# Patient Record
Sex: FEMALE | Race: BLACK OR AFRICAN AMERICAN | NOT HISPANIC OR LATINO | Employment: OTHER | URBAN - METROPOLITAN AREA
[De-identification: names, ages, dates, MRNs, and addresses within clinical notes are randomized per-mention and may not be internally consistent; named-entity substitution may affect disease eponyms.]

---

## 2020-01-06 ENCOUNTER — OFFICE VISIT (OUTPATIENT)
Dept: FAMILY MEDICINE | Facility: CLINIC | Age: 85
End: 2020-01-06
Payer: MEDICARE

## 2020-01-06 VITALS
SYSTOLIC BLOOD PRESSURE: 130 MMHG | HEIGHT: 59 IN | WEIGHT: 138.69 LBS | TEMPERATURE: 98 F | DIASTOLIC BLOOD PRESSURE: 60 MMHG | BODY MASS INDEX: 27.96 KG/M2 | OXYGEN SATURATION: 95 % | RESPIRATION RATE: 16 BRPM | HEART RATE: 68 BPM

## 2020-01-06 DIAGNOSIS — E11.59 HYPERTENSION ASSOCIATED WITH DIABETES: Primary | ICD-10-CM

## 2020-01-06 DIAGNOSIS — E11.9 TYPE 2 DIABETES MELLITUS WITHOUT COMPLICATION, WITHOUT LONG-TERM CURRENT USE OF INSULIN: ICD-10-CM

## 2020-01-06 DIAGNOSIS — Z78.0 POSTMENOPAUSAL: ICD-10-CM

## 2020-01-06 DIAGNOSIS — H40.9 GLAUCOMA, UNSPECIFIED GLAUCOMA TYPE, UNSPECIFIED LATERALITY: ICD-10-CM

## 2020-01-06 DIAGNOSIS — M79.676 PAIN OF GREAT TOE, UNSPECIFIED LATERALITY: ICD-10-CM

## 2020-01-06 DIAGNOSIS — R01.1 CARDIAC MURMUR: ICD-10-CM

## 2020-01-06 DIAGNOSIS — E03.9 HYPOTHYROIDISM, UNSPECIFIED TYPE: ICD-10-CM

## 2020-01-06 DIAGNOSIS — I15.2 HYPERTENSION ASSOCIATED WITH DIABETES: Primary | ICD-10-CM

## 2020-01-06 PROCEDURE — 1126F PR PAIN SEVERITY QUANTIFIED, NO PAIN PRESENT: ICD-10-PCS | Mod: S$GLB,,, | Performed by: FAMILY MEDICINE

## 2020-01-06 PROCEDURE — 1101F PR PT FALLS ASSESS DOC 0-1 FALLS W/OUT INJ PAST YR: ICD-10-PCS | Mod: CPTII,S$GLB,, | Performed by: FAMILY MEDICINE

## 2020-01-06 PROCEDURE — 99999 PR PBB SHADOW E&M-NEW PATIENT-LVL IV: ICD-10-PCS | Mod: PBBFAC,,, | Performed by: FAMILY MEDICINE

## 2020-01-06 PROCEDURE — 1159F PR MEDICATION LIST DOCUMENTED IN MEDICAL RECORD: ICD-10-PCS | Mod: S$GLB,,, | Performed by: FAMILY MEDICINE

## 2020-01-06 PROCEDURE — 1101F PT FALLS ASSESS-DOCD LE1/YR: CPT | Mod: CPTII,S$GLB,, | Performed by: FAMILY MEDICINE

## 2020-01-06 PROCEDURE — 99204 PR OFFICE/OUTPT VISIT, NEW, LEVL IV, 45-59 MIN: ICD-10-PCS | Mod: S$GLB,,, | Performed by: FAMILY MEDICINE

## 2020-01-06 PROCEDURE — 1126F AMNT PAIN NOTED NONE PRSNT: CPT | Mod: S$GLB,,, | Performed by: FAMILY MEDICINE

## 2020-01-06 PROCEDURE — 99204 OFFICE O/P NEW MOD 45 MIN: CPT | Mod: S$GLB,,, | Performed by: FAMILY MEDICINE

## 2020-01-06 PROCEDURE — 1159F MED LIST DOCD IN RCRD: CPT | Mod: S$GLB,,, | Performed by: FAMILY MEDICINE

## 2020-01-06 PROCEDURE — 99999 PR PBB SHADOW E&M-NEW PATIENT-LVL IV: CPT | Mod: PBBFAC,,, | Performed by: FAMILY MEDICINE

## 2020-01-06 RX ORDER — AMLODIPINE BESYLATE 10 MG/1
5 TABLET ORAL DAILY
COMMUNITY
Start: 2019-12-30 | End: 2021-06-07

## 2020-01-06 RX ORDER — LEVOTHYROXINE SODIUM 50 UG/1
50 CAPSULE ORAL
Qty: 90 TABLET | Refills: 1 | Status: SHIPPED | OUTPATIENT
Start: 2020-01-06 | End: 2020-07-15

## 2020-01-06 RX ORDER — HYDRALAZINE HYDROCHLORIDE 100 MG/1
100 TABLET, FILM COATED ORAL EVERY 8 HOURS
COMMUNITY
End: 2020-01-06 | Stop reason: CLARIF

## 2020-01-06 RX ORDER — BUMETANIDE 1 MG/1
1 TABLET ORAL DAILY
COMMUNITY
Start: 2017-04-08

## 2020-01-06 RX ORDER — FLASH GLUCOSE SENSOR
KIT MISCELLANEOUS
COMMUNITY
Start: 2019-12-14 | End: 2020-01-13 | Stop reason: SDUPTHER

## 2020-01-06 RX ORDER — LEVOTHYROXINE SODIUM 25 UG/1
50 TABLET ORAL
COMMUNITY
End: 2020-01-06 | Stop reason: SDUPTHER

## 2020-01-06 RX ORDER — HYDRALAZINE HYDROCHLORIDE 10 MG/1
10 TABLET, FILM COATED ORAL 3 TIMES DAILY
COMMUNITY
End: 2020-01-06 | Stop reason: SDUPTHER

## 2020-01-06 RX ORDER — LOSARTAN POTASSIUM 100 MG/1
100 TABLET ORAL DAILY
COMMUNITY
Start: 2019-12-30 | End: 2021-05-06

## 2020-01-06 RX ORDER — DORZOLAMIDE HYDROCHLORIDE AND TIMOLOL MALEATE 20; 5 MG/ML; MG/ML
1 SOLUTION/ DROPS OPHTHALMIC
COMMUNITY

## 2020-01-06 RX ORDER — CETIRIZINE HYDROCHLORIDE 10 MG/1
10 TABLET ORAL DAILY
COMMUNITY

## 2020-01-06 RX ORDER — HYDRALAZINE HYDROCHLORIDE 10 MG/1
10 TABLET, FILM COATED ORAL 3 TIMES DAILY
Qty: 270 TABLET | Refills: 1 | Status: SHIPPED | OUTPATIENT
Start: 2020-01-06 | End: 2020-01-10

## 2020-01-06 RX ORDER — CLONIDINE HYDROCHLORIDE 0.1 MG/1
0.1 TABLET ORAL 2 TIMES DAILY
COMMUNITY

## 2020-01-06 NOTE — PROGRESS NOTES
Edie Hammond    Chief Complaint   Patient presents with    Establish Care       History of Present Illness:   Ms. Hammond comes in today to establish care with me.  She is not fasting but has taken medication today.  She states she has been previously followed by PCP Dr. Teddy Murdock with last visit 3 months ago.      She states she is due for 3-month labs (A1c, lipid panel, CMP) follow-up for diabetes.  She states she currently takes Januvia 100 mg daily for treatment of diabetes for 1 month without problems.  She states she uses Freestyle Tanmay CGM with fasting levels ranging <100 and 2-hr postprandial levels ranging <200's.      She states she follows with cardiologist Dr. Milton with last visit in December 2019 at which time she states she had cardiac test performed.    She states she occasionally feels tired.      She states she occasionally has pain at her right great toe; she desires to see Podiatry.    Otherwise, she denies having fever, chills, appetite changes; shortness of breath, cough, wheezing; chest pain, palpitations, leg swelling; abdominal pain, nausea, vomiting, diarrhea, constipation; unusual urinary symptoms; polydipsia, polyuria, polyphagia; back pain; headache; anxiety, depression, homicidal or suicidal thoughts.  She states she monitors her diet and exercises little.      Past Medical History:  No date: Diabetes  No date: Glaucoma      Comment:  Follows with Dr. 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 Faisal Milton, cardiology  No date: HTN (hypertension)  No date: Hypothyroidism  No date: Postmenopausal  No date: Seasonal allergies      Current Outpatient Medications   Medication Sig    amLODIPine (NORVASC) 10 MG tablet Take 5 mg by mouth once daily.     bumetanide (BUMEX) 1 MG tablet One tablet by mouth every Sunday and Wednesday  Indications: hypertension    cetirizine (ZYRTEC) 10 MG tablet Take 10 mg by  mouth once daily.     cloNIDine (CATAPRES) 0.1 MG tablet Take 0.1 mg by mouth daily as needed (takes only if SBP > 180 per cardiologist).     dorzolamide-timolol 2-0.5% (COSOPT) 22.3-6.8 mg/mL ophthalmic solution 1 drop.    FREESTYLE EVELYN 14 DAY SENSOR Kit UTD TO CHECK BLOOD GLUCOSE. CHANGE Q 14 DAYS    levothyroxine (SYNTHROID) 25 MCG tablet Take 50 mcg by mouth before breakfast.     losartan (COZAAR) 100 MG tablet Take 100 mg by mouth once daily.    multivitamin with minerals tablet Take 1 tablet by mouth once daily.     SITagliptin (JANUVIA) 100 MG Tab Take 100 mg by mouth once daily.       Hydralazine 10 mg                      Take 10 mg three times daily    Review of Systems   Constitutional: Positive for fatigue. Negative for activity change, appetite change, chills and fever.   Respiratory: Negative for cough, shortness of breath and wheezing.    Cardiovascular: Negative for chest pain, palpitations and leg swelling.        See history of present illness.   Gastrointestinal: Negative for abdominal pain, constipation, diarrhea, nausea and vomiting.   Endocrine: Negative for cold intolerance, heat intolerance, polydipsia, polyphagia and polyuria.        See history of present illness.   Genitourinary: Negative for difficulty urinating.   Musculoskeletal: Positive for myalgias. Negative for back pain.   Neurological: Negative for headaches.   Psychiatric/Behavioral: Negative for dysphoric mood and suicidal ideas. The patient is not nervous/anxious.         Negative for homicidal ideas.       Objective:  Physical Exam   Constitutional: She is oriented to person, place, and time. She appears well-developed and well-nourished. No distress.   Pleasant.   Neck: Normal range of motion. Neck supple. No thyromegaly present.   Cardiovascular: Normal rate, regular rhythm and intact distal pulses.   Murmur heard.  Pulses:       Dorsalis pedis pulses are 3+ on the right side, and 3+ on the left side.         Posterior tibial pulses are 3+ on the right side, and 3+ on the left side.   Chronic per patient (murmur noted).   Pulmonary/Chest: Effort normal and breath sounds normal. No stridor. No respiratory distress. She has no wheezes.   Abdominal: Soft. Bowel sounds are normal. She exhibits no distension and no mass. There is no tenderness. There is no guarding.   Musculoskeletal: Normal range of motion. She exhibits tenderness. She exhibits no edema.   Slightly tender right great toe without swelling, warmth or redness and with full range of motion noted. She is ambulatory with assistance of cane.   Feet:   Right Foot:   Protective Sensation: 5 sites tested. 5 sites sensed.   Skin Integrity: Negative for ulcer or skin breakdown.   Left Foot:   Protective Sensation: 5 sites tested. 5 sites sensed.   Skin Integrity: Negative for ulcer or skin breakdown.   Lymphadenopathy:     She has no cervical adenopathy.   Neurological: She is alert and oriented to person, place, and time.   Skin: She is not diaphoretic.   Psychiatric: She has a normal mood and affect. Her behavior is normal. Judgment and thought content normal.   Vitals reviewed.      ASSESSMENT:  1. Hypertension associated with diabetes    2. Cardiac murmur    3. Type 2 diabetes mellitus without complication, without long-term current use of insulin    4. Hypothyroidism, unspecified type    5. Pain of great toe, unspecified laterality    6. Glaucoma, unspecified glaucoma type, unspecified laterality    7. Postmenopausal        PLAN:  Edie Almanzar was seen today for establish care.    Diagnoses and all orders for this visit:    Hypertension associated with diabetes  -     Lipid panel; Future  -     hydrALAZINE (APRESOLINE) 10 MG tablet; Take 1 tablet (10 mg total) by mouth 3 (three) times daily.    Cardiac murmur    Type 2 diabetes mellitus without complication, without long-term current use of insulin  -     Hemoglobin A1c; Future  -     Comprehensive metabolic  panel; Future  -     Lipid panel; Future  -     SITagliptin (JANUVIA) 100 MG Tab; Take 1 tablet (100 mg total) by mouth once daily.  -     Ambulatory referral to Podiatry    Hypothyroidism, unspecified type  -     levothyroxine (TIROSINT) 50 mcg Cap; Take 50 mcg by mouth before breakfast.    Pain of great toe, unspecified laterality  -     Ambulatory referral to Podiatry    Glaucoma, unspecified glaucoma type, unspecified laterality    Postmenopausal       Patient advised to call for results/follow up recommendations.  Continue current medications, follow low sodium, low cholesterol, low carb diet, daily walks.  Prescription refills as noted above.  Keep follow up with specialists.  Follow up in about 3 months (around 4/6/2020) for diabetes follow up.

## 2020-01-07 ENCOUNTER — LAB VISIT (OUTPATIENT)
Dept: LAB | Facility: HOSPITAL | Age: 85
End: 2020-01-07
Attending: FAMILY MEDICINE
Payer: MEDICARE

## 2020-01-07 ENCOUNTER — TELEPHONE (OUTPATIENT)
Dept: FAMILY MEDICINE | Facility: CLINIC | Age: 85
End: 2020-01-07

## 2020-01-07 DIAGNOSIS — E11.59 HYPERTENSION ASSOCIATED WITH DIABETES: ICD-10-CM

## 2020-01-07 DIAGNOSIS — I15.2 HYPERTENSION ASSOCIATED WITH DIABETES: ICD-10-CM

## 2020-01-07 DIAGNOSIS — E11.9 TYPE 2 DIABETES MELLITUS WITHOUT COMPLICATION, WITHOUT LONG-TERM CURRENT USE OF INSULIN: ICD-10-CM

## 2020-01-07 PROCEDURE — 83036 HEMOGLOBIN GLYCOSYLATED A1C: CPT

## 2020-01-07 PROCEDURE — 80061 LIPID PANEL: CPT

## 2020-01-07 PROCEDURE — 80053 COMPREHEN METABOLIC PANEL: CPT

## 2020-01-07 PROCEDURE — 36415 COLL VENOUS BLD VENIPUNCTURE: CPT | Mod: PO

## 2020-01-07 RX ORDER — INSULIN PUMP SYRINGE, 3 ML
EACH MISCELLANEOUS
Qty: 1 EACH | Refills: 0 | Status: SHIPPED | OUTPATIENT
Start: 2020-01-07 | End: 2021-02-18 | Stop reason: SDUPTHER

## 2020-01-07 RX ORDER — LANCETS
EACH MISCELLANEOUS
Qty: 300 EACH | Refills: 3 | Status: SHIPPED | OUTPATIENT
Start: 2020-01-07

## 2020-01-07 NOTE — TELEPHONE ENCOUNTER
----- Message from Narinder Graham sent at 1/7/2020  9:22 AM CST -----  Contact: Juaquin olivas/ [facility]   Caller called In regards to speaking with the staff in regards to diabetic supplies for the pt. Caller can be reached at [phone]

## 2020-01-07 NOTE — TELEPHONE ENCOUNTER
Please give printed copies for diabetes supplies to [facility] as I think these are sent directly to [facility]. Thanks.

## 2020-01-08 ENCOUNTER — TELEPHONE (OUTPATIENT)
Dept: PODIATRY | Facility: CLINIC | Age: 85
End: 2020-01-08

## 2020-01-08 ENCOUNTER — TELEPHONE (OUTPATIENT)
Dept: FAMILY MEDICINE | Facility: CLINIC | Age: 85
End: 2020-01-08

## 2020-01-08 DIAGNOSIS — E11.59 HYPERTENSION ASSOCIATED WITH DIABETES: ICD-10-CM

## 2020-01-08 DIAGNOSIS — I15.2 HYPERTENSION ASSOCIATED WITH DIABETES: ICD-10-CM

## 2020-01-08 LAB
ALBUMIN SERPL BCP-MCNC: 3.8 G/DL (ref 3.5–5.2)
ALP SERPL-CCNC: 62 U/L (ref 55–135)
ALT SERPL W/O P-5'-P-CCNC: 12 U/L (ref 10–44)
ANION GAP SERPL CALC-SCNC: 8 MMOL/L (ref 8–16)
AST SERPL-CCNC: 18 U/L (ref 10–40)
BILIRUB SERPL-MCNC: 0.4 MG/DL (ref 0.1–1)
BUN SERPL-MCNC: 21 MG/DL (ref 8–23)
CALCIUM SERPL-MCNC: 10 MG/DL (ref 8.7–10.5)
CHLORIDE SERPL-SCNC: 103 MMOL/L (ref 95–110)
CHOLEST SERPL-MCNC: 205 MG/DL (ref 120–199)
CHOLEST/HDLC SERPL: 2.5 {RATIO} (ref 2–5)
CO2 SERPL-SCNC: 29 MMOL/L (ref 23–29)
CREAT SERPL-MCNC: 0.8 MG/DL (ref 0.5–1.4)
EST. GFR  (AFRICAN AMERICAN): >60 ML/MIN/1.73 M^2
EST. GFR  (NON AFRICAN AMERICAN): >60 ML/MIN/1.73 M^2
ESTIMATED AVG GLUCOSE: 123 MG/DL (ref 68–131)
GLUCOSE SERPL-MCNC: 95 MG/DL (ref 70–110)
HBA1C MFR BLD HPLC: 5.9 % (ref 4–5.6)
HDLC SERPL-MCNC: 83 MG/DL (ref 40–75)
HDLC SERPL: 40.5 % (ref 20–50)
LDLC SERPL CALC-MCNC: 107.4 MG/DL (ref 63–159)
NONHDLC SERPL-MCNC: 122 MG/DL
POTASSIUM SERPL-SCNC: 4 MMOL/L (ref 3.5–5.1)
PROT SERPL-MCNC: 7.1 G/DL (ref 6–8.4)
SODIUM SERPL-SCNC: 140 MMOL/L (ref 136–145)
TRIGL SERPL-MCNC: 73 MG/DL (ref 30–150)

## 2020-01-08 NOTE — TELEPHONE ENCOUNTER
----- Message from Miri Carmen sent at 1/8/2020  4:03 PM CST -----  Contact: PATIENT  Type:  Test Results    Who Called: PATIENT  Name of Test (Lab/Mammo/Etc): LAB   Date of Test: YESTERDAY  Ordering Provider: DAISY  Where the test was performed: [facility]  Would the patient rather a call back or a response via [facility]? CALL  Best Call Back Number: [phone]  Additional Information:  PLEASE RETURN CALL

## 2020-01-09 NOTE — TELEPHONE ENCOUNTER
To confirm the correct dose (10 mg vs 100 mg), please ask pt before coming to me, where did she have the last refill for Hydralazine; then contact that pharmacy for correct information. Thanks.

## 2020-01-09 NOTE — TELEPHONE ENCOUNTER
----- Message from Anneliese Levin sent at 1/9/2020  3:11 PM CST -----  Contact: Alejandra  .Type:  Pharmacy Calling to Clarify an RX    Name of Caller: Alejandra  Pharmacy Name: [facility]   Prescription Name: levothyroxine   What do they need to clarify?: change to synthroid tables instead of the capsules   Best Call Back Number: [phone]   Additional Information:

## 2020-01-09 NOTE — TELEPHONE ENCOUNTER
Advise pt lab results are within acceptable range. Please continue her current medications and keep f/u appt w/me. Thanks for call.

## 2020-01-09 NOTE — TELEPHONE ENCOUNTER
Pharmacy wants to know if the hyrdizline is prescribed 10 mg 3x a day or 100 mg 3x a day. Pt was taking it 100mg 3 times a day

## 2020-01-09 NOTE — TELEPHONE ENCOUNTER
Pt is new to me; so, I don't know if she was on capsules for specific reason.  Okay to check with patient and if she is okay with change, then let pharmacy know okay to change for capsule to generic tablet. Thanks.

## 2020-01-09 NOTE — TELEPHONE ENCOUNTER
Pharmacy wants to know if it is ok to give generic synthroid tablets instead of levothyroxine capsules.

## 2020-01-10 RX ORDER — HYDRALAZINE HYDROCHLORIDE 100 MG/1
100 TABLET, FILM COATED ORAL 3 TIMES DAILY
Qty: 270 TABLET | Refills: 1 | Status: SHIPPED | OUTPATIENT
Start: 2020-01-10 | End: 2020-06-23

## 2020-01-10 NOTE — TELEPHONE ENCOUNTER
So, see if we can call Dr. Milton's office to see when she last saw him and what he recommended for her. Thanks.

## 2020-01-10 NOTE — TELEPHONE ENCOUNTER
Last office visit with Dr Faisal Milton office 11/18/2019     Recommendations  per Cardiologist 100 mg 3X a day.    Contacted pharmacy to correct the Rx.    Contacted pt  to inform her the corrected Rx will be ready for  in 1 hour.

## 2020-01-10 NOTE — TELEPHONE ENCOUNTER
Pt states her cardiologist Dr. Faisal Myers prescribed 100mg 3x a day.  Pt states you told her in her visit you wanted her on 10mg 3x a day.   Pt states she is confused and wants your recommendations. Please advise.

## 2020-01-13 ENCOUNTER — TELEPHONE (OUTPATIENT)
Dept: FAMILY MEDICINE | Facility: CLINIC | Age: 85
End: 2020-01-13

## 2020-01-13 DIAGNOSIS — E11.9 TYPE 2 DIABETES MELLITUS WITHOUT COMPLICATION, WITHOUT LONG-TERM CURRENT USE OF INSULIN: Primary | ICD-10-CM

## 2020-01-13 RX ORDER — FLASH GLUCOSE SENSOR
KIT MISCELLANEOUS
Qty: 2 KIT | Refills: 11 | Status: SHIPPED | OUTPATIENT
Start: 2020-01-13 | End: 2020-05-12

## 2020-01-13 NOTE — TELEPHONE ENCOUNTER
----- Message from Mary Anne Avery sent at 1/13/2020  3:49 PM CST -----  Contact: pt   Type:  Diabetic/Medical Supplies Request    Name of Caller: pt   What supplies are needed:testing machine  What is the brand of the supplies: freestyle arcelia  Refill or New Rx: refill   If checking glucose, how many times do they check it?:twice   Who prescribed the original supplies: [email or web address]   Pharmacy/Company Name, Phone #, Location: [facility]   Requesting a Call Back?: yes   Would the patient rather a call back or a response via [facility]? phone  Best Call Back Number: [phone]  Additional Information:     [facility]

## 2020-01-15 ENCOUNTER — TELEPHONE (OUTPATIENT)
Dept: FAMILY MEDICINE | Facility: CLINIC | Age: 85
End: 2020-01-15

## 2020-01-15 NOTE — TELEPHONE ENCOUNTER
----- Message from Shanda Pastrana sent at 1/15/2020  2:19 PM CST -----  Contact: [employer]/shalini Luz called to get the following information sent to her to get pt diabetic supplies.  Medical neccessity form, clinical notes , and doctors notes faxed to [phone].      Thanks,  Shanda aPstrana

## 2020-03-04 ENCOUNTER — PATIENT MESSAGE (OUTPATIENT)
Dept: FAMILY MEDICINE | Facility: CLINIC | Age: 85
End: 2020-03-04

## 2020-03-05 ENCOUNTER — OFFICE VISIT (OUTPATIENT)
Dept: FAMILY MEDICINE | Facility: CLINIC | Age: 85
End: 2020-03-05
Payer: MEDICARE

## 2020-03-05 VITALS
TEMPERATURE: 98 F | HEIGHT: 59 IN | OXYGEN SATURATION: 97 % | DIASTOLIC BLOOD PRESSURE: 52 MMHG | SYSTOLIC BLOOD PRESSURE: 110 MMHG | WEIGHT: 140 LBS | HEART RATE: 72 BPM | BODY MASS INDEX: 28.22 KG/M2

## 2020-03-05 DIAGNOSIS — M54.9 MID BACK PAIN ON RIGHT SIDE: ICD-10-CM

## 2020-03-05 DIAGNOSIS — J06.9 VIRAL URI WITH COUGH: Primary | ICD-10-CM

## 2020-03-05 PROCEDURE — 1125F AMNT PAIN NOTED PAIN PRSNT: CPT | Mod: S$GLB,,, | Performed by: FAMILY MEDICINE

## 2020-03-05 PROCEDURE — 1101F PR PT FALLS ASSESS DOC 0-1 FALLS W/OUT INJ PAST YR: ICD-10-PCS | Mod: CPTII,S$GLB,, | Performed by: FAMILY MEDICINE

## 2020-03-05 PROCEDURE — 99214 OFFICE O/P EST MOD 30 MIN: CPT | Mod: S$GLB,,, | Performed by: FAMILY MEDICINE

## 2020-03-05 PROCEDURE — 1125F PR PAIN SEVERITY QUANTIFIED, PAIN PRESENT: ICD-10-PCS | Mod: S$GLB,,, | Performed by: FAMILY MEDICINE

## 2020-03-05 PROCEDURE — 1101F PT FALLS ASSESS-DOCD LE1/YR: CPT | Mod: CPTII,S$GLB,, | Performed by: FAMILY MEDICINE

## 2020-03-05 PROCEDURE — 1159F PR MEDICATION LIST DOCUMENTED IN MEDICAL RECORD: ICD-10-PCS | Mod: S$GLB,,, | Performed by: FAMILY MEDICINE

## 2020-03-05 PROCEDURE — 99214 PR OFFICE/OUTPT VISIT, EST, LEVL IV, 30-39 MIN: ICD-10-PCS | Mod: S$GLB,,, | Performed by: FAMILY MEDICINE

## 2020-03-05 PROCEDURE — 99999 PR PBB SHADOW E&M-EST. PATIENT-LVL IV: CPT | Mod: PBBFAC,,, | Performed by: FAMILY MEDICINE

## 2020-03-05 PROCEDURE — 1159F MED LIST DOCD IN RCRD: CPT | Mod: S$GLB,,, | Performed by: FAMILY MEDICINE

## 2020-03-05 PROCEDURE — 99999 PR PBB SHADOW E&M-EST. PATIENT-LVL IV: ICD-10-PCS | Mod: PBBFAC,,, | Performed by: FAMILY MEDICINE

## 2020-03-05 RX ORDER — HYDROCODONE POLISTIREX AND CHLORPHENIRAMINE POLISTIREX 10; 8 MG/5ML; MG/5ML
5 SUSPENSION, EXTENDED RELEASE ORAL EVERY 12 HOURS PRN
Qty: 115 ML | Refills: 0 | Status: SHIPPED | OUTPATIENT
Start: 2020-03-05 | End: 2020-05-12

## 2020-03-05 NOTE — PROGRESS NOTES
CHIEF COMPLAINT:  This is a 89-year-old female complaining of and back pain.    SUBJECTIVE:  Patient complains of onset of respiratory illness last week.  She started with sneezing and runny nose which evolved into coughing.  Over the last 2-3 days, she has been experiencing intense right mid back pain with deep coughing.  Cough is minimally productive.  She denies stuffy nose, ear pain, sore throat, chest congestion, shortness of breath or wheezing.  Patient denies fever or chills.  She denies ill contacts.  She received influenza vaccine.  Patient has been taking 2 different prescription cough medications for her cough.  She produces an empty bottle of genericTussionex which works better than another cough syrup with codeine left over from a previous illness.  Patient denies radiation of pain and back to her upper or lower extremities.  She denies numbness, tingling or weakness in limb.    ROS:  GENERAL: Patient denies fever, chills, night sweats.  Patient denies weight gain or loss. Patient denies anorexia, fatigue, weakness or swollen glands.  SKIN: Patient denies rash.  HEENT: Patient denies sore throat, ear pain, hearing loss, nasal congestion, visual disturbance, eye irritation or discharge. Positive for runny nose.  LUNGS: Patient denies wheeze or hemoptysis.  Positive for cough.  CARDIOVASCULAR: Patient denies chest pain, shortness of breath, palpitations, syncope or lower extremity edema.  GI: Patient denies abdominal pain, nausea, vomiting, diarrhea, constipation, blood in stool or melena.  MUSCULOSKELETAL: Patient denies joint pain, swelling, redness or warmth.  Positive for back pain.  NEUROLOGIC: Patient denies headache, vertigo, paresthesias, weakness in limb, or abnormality of gait.    OBJECTIVE:   GENERAL: Well-developed well-nourished female alert and oriented x3 in no acute distress.  Memory, judgment and cognition without deficit.  No audible wheezing.  SKIN: Clear without rash.  Normal color and  tone.  HEENT: Eyes: Clear conjunctivae.  No scleral icterus.  Ears: Clear canals.  Clear TMs.  Nose: Without congestion.  Pharynx: Without injection or exudates.  NECK: Supple, normal range of motion.  No lymphadenopathy.  No masses or enlarged thyroid.  No JVD.  Carotids 2+ and equal.  No bruits.  LUNGS: Clear to auscultation.  Normal respiratory effort.  CARDIOVASCULAR: Regular rhythm, normal S1, S2 with grade 2/6 systolic murmur heard best at upper sternal border.  No gallop or rub.  BACK: No CVA or spinal tenderness. Tenderness along right paraspinous region at waist extending ports flank.  ABDOMEN: Soft, nontender without mass or organomegaly.  No rebound or guarding.  EXTREMITIES: Without cyanosis, clubbing or edema.  Distal pulses 2+ and equal.  Normal range of motion in upper extremities.  No joint effusion, erythema or warmth.  NEUROLOGIC:  Motor strength equal bilaterally.  Sensation normal to touch.  Deep tendon reflexes 2+ and equal.  Gait without abnormality.  No tremor.      ASSESSMENT:  1. Viral URI with cough    2. Mid back pain on right side      PLAN:   1.  Printed prescription for Tussionex given to be used if necessary.  2.  Encouraged patient to keep well hydrated.  3.  Take Aleve 2 tablets 3 times daily as needed for back pain.  4.  Apply moist heat and/or ice q.i.d. for 15-20 minutes.  5.  Follow-up no improvement or worsening symptoms.    This note is generated with speech recognition software and is subject to transcription error and sound alike phrases that may be missed by proofreading.

## 2020-03-09 ENCOUNTER — TELEPHONE (OUTPATIENT)
Dept: FAMILY MEDICINE | Facility: CLINIC | Age: 85
End: 2020-03-09

## 2020-03-09 DIAGNOSIS — R05.9 COUGH: Primary | ICD-10-CM

## 2020-03-09 RX ORDER — CODEINE PHOSPHATE AND GUAIFENESIN 10; 100 MG/5ML; MG/5ML
5 SOLUTION ORAL EVERY 8 HOURS PRN
Qty: 118 ML | Refills: 0 | Status: SHIPPED | OUTPATIENT
Start: 2020-03-09 | End: 2022-01-04 | Stop reason: SDUPTHER

## 2020-03-09 NOTE — TELEPHONE ENCOUNTER
----- Message from Indigo Grace sent at 3/9/2020  2:30 PM CDT -----  Contact: Self- [phone]  [employer] doesn't coverhydrocodone-chlorpheniramine (TUSSIONEX) 10-8 mg/5 mL suspension. Pt is asking for Cheratussin medication be called in. Please call back to confirm at [phone].     [facility] [employer]   Phones :[phone]        Thank You,   Indigo Grace

## 2020-04-02 ENCOUNTER — TELEPHONE (OUTPATIENT)
Dept: FAMILY MEDICINE | Facility: CLINIC | Age: 85
End: 2020-04-02

## 2020-04-02 NOTE — TELEPHONE ENCOUNTER
----- Message from Nataly Garay sent at 4/2/2020  3:39 PM CDT -----  Contact: pt   Would like to consult with nurse regarding appt on 4/9/20, wants to know is it for lab test because she gets tested every 3 month. Please give a call back at [phone].          Thanks,  Nataly LOPEZ

## 2020-04-08 NOTE — TELEPHONE ENCOUNTER
Pt insurance covers -----    monetasone   triamicinolone     Would like one of these creams for an itch in vaginal area  Denies discharge, only external itching   Pt had script from GYN but insurance does not cover any more  Pt would prefer triamicinolone at 90 day supply at no cost  With [employer]

## 2020-04-23 ENCOUNTER — TELEPHONE (OUTPATIENT)
Dept: INTERNAL MEDICINE | Facility: CLINIC | Age: 85
End: 2020-04-23

## 2020-04-23 NOTE — TELEPHONE ENCOUNTER
----- Message from Elicia Seals sent at 4/23/2020 10:15 AM CDT -----  Contact: Pt  Pt is requesting call back in regards to questions about cream that was sent to pharmacy            Pls call back at [phone]

## 2020-04-23 NOTE — TELEPHONE ENCOUNTER
I'm not sure this is appropriate for vaginal itch.  What cream had she been prescribed by her GYN (so I can compare)?

## 2020-04-27 RX ORDER — NYSTATIN 100000 U/G
CREAM TOPICAL 2 TIMES DAILY PRN
Qty: 30 G | Refills: 3 | Status: SHIPPED | OUTPATIENT
Start: 2020-04-27 | End: 2020-08-07 | Stop reason: SDUPTHER

## 2020-04-27 NOTE — TELEPHONE ENCOUNTER
Patient notified her diabetes level was good in January, she can see me in July 2020 for diabetes f/u (w/diabetes lab) at that time.     Also, clotrimazole (anti-yeast) and monetasone or triamicinolone (steroid) ARE NOT similar medications; I do not recommend use of steroid in place of anti-yeast medication.  I can prescribe Nystatin in plact of clotrimazole; hopefully, insurance will cover.    Patient states she would like to see  in May, she doesn't want to wait until July for Diabetes check. Appointment booked.

## 2020-04-27 NOTE — TELEPHONE ENCOUNTER
If requesting A1c lab, she can't be scheduled for her diabetes f/u appt w/me (by virtual visit is appropriate).    Please find out from insurance company or [facility] nurse name of cream previously prescribed by GYN as I'm not certain that triamcinolone is appropriate equivalent. Thanks.

## 2020-04-27 NOTE — TELEPHONE ENCOUNTER
Patient states is an external pubic itch, patient states [facility] nurse told her the one her ob gave her is not covered but she doesn't remember the name. Her [facility] nurse told her triamicinolone at 90 day is covered.     Patient states its time for her A1C check. Can you order lab.

## 2020-04-27 NOTE — TELEPHONE ENCOUNTER
----- Message from [facility] sent at 4/27/2020  9:25 AM CDT -----  Contact: pt   Pt is returning a missed call from Friday pt can be reached at [phone]

## 2020-04-27 NOTE — TELEPHONE ENCOUNTER
As her diabetes level was good in January, she can see me in July 2020 for diabetes f/u (w/diabetes lab) at that time.    Also, clotrimazole (anti-yeast) and monetasone or triamicinolone (steroid) ARE NOT similar medications; I do not recommend use of steroid in place of anti-yeast medication.  I can prescribe Nystatin in plact of clotrimazole; hopefully, insurance will cover.     Thanks.

## 2020-04-27 NOTE — TELEPHONE ENCOUNTER
Patient states her diabetes appointment was canceled by us due to covid-19 so she only wanted to get labs for now but if you feel she need to come in she can. [facility] patient was prescribed clotrimazole.

## 2020-05-08 ENCOUNTER — TELEPHONE (OUTPATIENT)
Dept: FAMILY MEDICINE | Facility: CLINIC | Age: 85
End: 2020-05-08

## 2020-05-08 NOTE — TELEPHONE ENCOUNTER
----- Message from Anneliese Levin sent at 5/8/2020  2:38 PM CDT -----  Contact: pt  Pt request orders for labs ()A1c and other labs ... Call back : [phone]

## 2020-05-12 ENCOUNTER — LAB VISIT (OUTPATIENT)
Dept: LAB | Facility: HOSPITAL | Age: 85
End: 2020-05-12
Payer: MEDICARE

## 2020-05-12 ENCOUNTER — OFFICE VISIT (OUTPATIENT)
Dept: FAMILY MEDICINE | Facility: CLINIC | Age: 85
End: 2020-05-12
Payer: MEDICARE

## 2020-05-12 VITALS
SYSTOLIC BLOOD PRESSURE: 117 MMHG | DIASTOLIC BLOOD PRESSURE: 46 MMHG | WEIGHT: 138.44 LBS | HEIGHT: 59 IN | HEART RATE: 70 BPM | BODY MASS INDEX: 27.91 KG/M2 | TEMPERATURE: 99 F | OXYGEN SATURATION: 97 %

## 2020-05-12 DIAGNOSIS — E03.9 HYPOTHYROIDISM, UNSPECIFIED TYPE: ICD-10-CM

## 2020-05-12 DIAGNOSIS — L60.9 NAIL ABNORMALITY: ICD-10-CM

## 2020-05-12 DIAGNOSIS — E11.9 TYPE 2 DIABETES MELLITUS WITHOUT COMPLICATION, WITHOUT LONG-TERM CURRENT USE OF INSULIN: ICD-10-CM

## 2020-05-12 DIAGNOSIS — E11.9 TYPE 2 DIABETES MELLITUS WITHOUT COMPLICATION, WITHOUT LONG-TERM CURRENT USE OF INSULIN: Primary | ICD-10-CM

## 2020-05-12 DIAGNOSIS — I15.2 HYPERTENSION ASSOCIATED WITH DIABETES: ICD-10-CM

## 2020-05-12 DIAGNOSIS — E11.59 HYPERTENSION ASSOCIATED WITH DIABETES: ICD-10-CM

## 2020-05-12 LAB
ESTIMATED AVG GLUCOSE: 117 MG/DL (ref 68–131)
HBA1C MFR BLD HPLC: 5.7 % (ref 4–5.6)
TSH SERPL DL<=0.005 MIU/L-ACNC: 1.81 UIU/ML (ref 0.4–4)

## 2020-05-12 PROCEDURE — 1159F MED LIST DOCD IN RCRD: CPT | Mod: S$GLB,,, | Performed by: FAMILY MEDICINE

## 2020-05-12 PROCEDURE — 99999 PR PBB SHADOW E&M-EST. PATIENT-LVL IV: CPT | Mod: PBBFAC,,, | Performed by: FAMILY MEDICINE

## 2020-05-12 PROCEDURE — 99999 PR PBB SHADOW E&M-EST. PATIENT-LVL IV: ICD-10-PCS | Mod: PBBFAC,,, | Performed by: FAMILY MEDICINE

## 2020-05-12 PROCEDURE — 83036 HEMOGLOBIN GLYCOSYLATED A1C: CPT

## 2020-05-12 PROCEDURE — 1126F AMNT PAIN NOTED NONE PRSNT: CPT | Mod: S$GLB,,, | Performed by: FAMILY MEDICINE

## 2020-05-12 PROCEDURE — 36415 COLL VENOUS BLD VENIPUNCTURE: CPT | Mod: PO

## 2020-05-12 PROCEDURE — 99214 OFFICE O/P EST MOD 30 MIN: CPT | Mod: S$GLB,,, | Performed by: FAMILY MEDICINE

## 2020-05-12 PROCEDURE — 1126F PR PAIN SEVERITY QUANTIFIED, NO PAIN PRESENT: ICD-10-PCS | Mod: S$GLB,,, | Performed by: FAMILY MEDICINE

## 2020-05-12 PROCEDURE — 1159F PR MEDICATION LIST DOCUMENTED IN MEDICAL RECORD: ICD-10-PCS | Mod: S$GLB,,, | Performed by: FAMILY MEDICINE

## 2020-05-12 PROCEDURE — 99499 RISK ADDL DX/OHS AUDIT: ICD-10-PCS | Mod: S$GLB,,, | Performed by: FAMILY MEDICINE

## 2020-05-12 PROCEDURE — 1101F PR PT FALLS ASSESS DOC 0-1 FALLS W/OUT INJ PAST YR: ICD-10-PCS | Mod: CPTII,S$GLB,, | Performed by: FAMILY MEDICINE

## 2020-05-12 PROCEDURE — 1101F PT FALLS ASSESS-DOCD LE1/YR: CPT | Mod: CPTII,S$GLB,, | Performed by: FAMILY MEDICINE

## 2020-05-12 PROCEDURE — 84443 ASSAY THYROID STIM HORMONE: CPT

## 2020-05-12 PROCEDURE — 99499 UNLISTED E&M SERVICE: CPT | Mod: S$GLB,,, | Performed by: FAMILY MEDICINE

## 2020-05-12 PROCEDURE — 99214 PR OFFICE/OUTPT VISIT, EST, LEVL IV, 30-39 MIN: ICD-10-PCS | Mod: S$GLB,,, | Performed by: FAMILY MEDICINE

## 2020-05-12 RX ORDER — FLUTICASONE PROPIONATE 50 MCG
2 SPRAY, SUSPENSION (ML) NASAL DAILY PRN
COMMUNITY
End: 2021-05-04

## 2020-05-12 NOTE — PROGRESS NOTES
Edie Hammond    Chief Complaint   Patient presents with    Diabetes    Follow-up       History of Present Illness:   Ms. Hammond comes in today for 3-month diabetes follow up. She is not fasting but has taken medication today.  She states she performs home glucose checks twice daily with morning levels ranging <100 (but sometimes appx. 100 if she does not sleep well at night) and with after-meal levels ranging < 200.  She states she monitors her diet and continues to walk in her yard even with staying in during Covid-19 restrictions.    She states she has been having dry cough with sneezing since Saturday.  She states she has been using Flonase with help.  She does not smoke and denies known exposure to Covid-19.    She states she occasionally has insomnia with next-day fatigue and sleepiness; however, she states she naps sometimes during the day with help and also states she occasionally takes OTC Melatonin at night with help.      She states she occasionally has toenail problems; she desires to see Podiatry.     Otherwise, she denies having fever, chills, appetite changes; shortness of breath, wheezing; other sinus symptoms; chest pain, palpitations, leg swelling; abdominal pain, nausea, vomiting, diarrhea, constipation; unusual urinary symptoms; polydipsia, polyuria, polyphagia, hot or cold intolerance; back pain; headache; anxiety, depression, homicidal or suicidal thoughts.    She states she follows with cardiologist Dr. Milton with last visit in December 2019 with follow up scheduled for May 24, 2020.  She follows with Dr. Reeves, ophthalmologist, for glaucoma surveillance with follow up scheduled for next week.      Labs:                      CHOL                     205 (H)             01/07/2020                 TRIG                     73                  01/07/2020                 HDL                      83 (H)              01/07/2020                 ALT                      12                   01/07/2020                 AST                      18                  01/07/2020                 NA                       140                 01/07/2020                 K                        4.0                 01/07/2020                 CL                       103                 01/07/2020                 CREATININE               0.8                 01/07/2020                 BUN                      21                  01/07/2020                 CO2                      29                  01/07/2020                 HGBA1C                   5.9 (H)             01/07/2020            LDLCALC                  107.4               01/07/2020              Diabetes   She has type 2 diabetes mellitus. No MedicAlert identification noted. The initial diagnosis of diabetes was made 4 years ago. Pertinent negatives for hypoglycemia include no confusion, headaches, hunger, mood changes, nervousness/anxiousness, pallor, sleepiness or sweats. Associated symptoms include blurred vision. Pertinent negatives for diabetes include no chest pain, no fatigue, no foot paresthesias, no foot ulcerations, no polydipsia, no polyphagia, no polyuria, no visual change and no weight loss. Pertinent negatives for hypoglycemia complications include no blackouts, no hospitalization, no nocturnal hypoglycemia, no required assistance and no required glucagon injection. Symptoms are stable. Pertinent negatives for diabetic complications include no autonomic neuropathy, CVA, heart disease, impotence, nephropathy, peripheral neuropathy, PVD or retinopathy. Risk factors for coronary artery disease include hypertension and post-menopausal. Current diabetic treatment includes diet and oral agent (monotherapy). She is compliant with treatment most of the time. Her weight is fluctuating minimally. She is following a generally healthy, high fiber and low fat/cholesterol diet. Meal planning includes avoidance of concentrated sweets, calorie counting  and carbohydrate counting. She has not had a previous visit with a dietitian. She participates in exercise three times a week. She monitors blood glucose at home 1-2 x per day. Blood glucose monitoring compliance is excellent. There is no change in her home blood glucose trend. She does not see a podiatrist.Eye exam is current.       Current Outpatient Medications   Medication Sig    amLODIPine (NORVASC) 10 MG tablet Take 5 mg by mouth once daily.     blood sugar diagnostic (BLOOD GLUCOSE TEST) Strp Use twice daily prn. Dx: E11.9    blood-glucose meter kit Use as instructed - dx: E11.9    bumetanide (BUMEX) 1 MG tablet But also states some times takes 3 times per week    cetirizine (ZYRTEC) 10 MG tablet Take 10 mg by mouth once daily.     cloNIDine (CATAPRES) 0.1 MG tablet Take 0.1 mg by mouth daily as needed (takes only if SBP > 180 per cardiologist).     dorzolamide-timolol 2-0.5% (COSOPT) 22.3-6.8 mg/mL ophthalmic solution 1 drop.    fluticasone propionate (FLONASE) 50 mcg/actuation nasal spray 2 sprays by Each Nostril route daily as needed for Rhinitis.    hydrALAZINE (APRESOLINE) 100 MG tablet Take 1 tablet (100 mg total) by mouth 3 (three) times daily.    lancets Misc Use twice daily prn. Dx: E11.9    levothyroxine (TIROSINT) 50 mcg Cap Take 50 mcg by mouth before breakfast.    losartan (COZAAR) 100 MG tablet Take 100 mg by mouth once daily.    multivitamin with minerals tablet Take 1 tablet by mouth once daily.     nystatin (MYCOSTATIN) cream Apply topically 2 (two) times daily as needed (irritated skin).    SITagliptin (JANUVIA) 100 MG Tab Take 1 tablet (100 mg total) by mouth once daily.       Review of Systems   Constitutional: Negative for activity change, appetite change, chills, fatigue, fever and weight loss.        Weight  62.9 kg (138 lb 10.7 oz) at January 6, 2020 visit.   HENT: Positive for sneezing. Negative for congestion, postnasal drip, rhinorrhea, sinus pressure, sinus pain and  sore throat.         See history of present illness.   Eyes: Positive for blurred vision.        See history of present illness.   Respiratory: Positive for cough. Negative for shortness of breath and wheezing.         See history of present illness.   Cardiovascular: Negative for chest pain, palpitations and leg swelling.        See history of present illness.   Gastrointestinal: Negative for abdominal pain, constipation, diarrhea, nausea and vomiting.   Endocrine: Negative for cold intolerance, heat intolerance, polydipsia, polyphagia and polyuria.        See history of present illness.   Genitourinary: Negative for difficulty urinating and impotence.   Musculoskeletal: Negative for back pain.   Skin: Negative for pallor.        See history of present illness.   Neurological: Negative for headaches.   Psychiatric/Behavioral: Positive for sleep disturbance. Negative for confusion, dysphoric mood and suicidal ideas. The patient is not nervous/anxious.         Negative for homicidal ideas.       Objective:  Physical Exam   Constitutional: She is oriented to person, place, and time. She appears well-developed and well-nourished. No distress.   Pleasant.   Neck: Normal range of motion. Neck supple. No thyromegaly present.   Cardiovascular: Normal rate, regular rhythm and intact distal pulses.   Murmur heard.  Pulses:       Dorsalis pedis pulses are 3+ on the right side, and 3+ on the left side.        Posterior tibial pulses are 3+ on the right side, and 3+ on the left side.   Chronic per patient (murmur noted).   Pulmonary/Chest: Effort normal and breath sounds normal. No stridor. No respiratory distress. She has no wheezes.   Abdominal: Soft. Bowel sounds are normal. She exhibits no distension and no mass. There is no tenderness. There is no guarding.   Musculoskeletal: Normal range of motion. She exhibits no edema or tenderness.   Hypertrophied toenails noted. She is ambulatory with assistance of cane.   Feet:    Right Foot:   Protective Sensation: 5 sites tested. 5 sites sensed.   Skin Integrity: Negative for ulcer or skin breakdown.   Left Foot:   Protective Sensation: 5 sites tested. 5 sites sensed.   Skin Integrity: Negative for ulcer or skin breakdown.   Lymphadenopathy:     She has no cervical adenopathy.   Neurological: She is alert and oriented to person, place, and time.   Skin: She is not diaphoretic.   Psychiatric: She has a normal mood and affect. Her behavior is normal. Judgment and thought content normal.   Vitals reviewed.      ASSESSMENT:  1. Type 2 diabetes mellitus without complication, without long-term current use of insulin    2. Hypertension associated with diabetes    3. Hypothyroidism, unspecified type    4. Nail abnormality        PLAN:  Edie Almanzar was seen today for diabetes and follow-up.    Diagnoses and all orders for this visit:    Type 2 diabetes mellitus without complication, without long-term current use of insulin  -     Hemoglobin A1C; Future    Hypertension associated with diabetes    Hypothyroidism, unspecified type  -     TSH; Future    Nail abnormality  -     Ambulatory referral/consult to Podiatry; Future       Patient advised to call for results.  Continue current medications, follow low sodium, low cholesterol, low carb diet, daily walks.  Keep follow up with specialists.  Flu shot this fall.  Follow up in about 3 months (around 8/12/2020) for physical.

## 2020-06-02 ENCOUNTER — OFFICE VISIT (OUTPATIENT)
Dept: FAMILY MEDICINE | Facility: CLINIC | Age: 85
End: 2020-06-02
Payer: MEDICARE

## 2020-06-02 DIAGNOSIS — R05.9 COUGH: Primary | ICD-10-CM

## 2020-06-02 PROCEDURE — 1101F PR PT FALLS ASSESS DOC 0-1 FALLS W/OUT INJ PAST YR: ICD-10-PCS | Mod: CPTII,95,, | Performed by: INTERNAL MEDICINE

## 2020-06-02 PROCEDURE — 1101F PT FALLS ASSESS-DOCD LE1/YR: CPT | Mod: CPTII,95,, | Performed by: INTERNAL MEDICINE

## 2020-06-02 PROCEDURE — 1159F PR MEDICATION LIST DOCUMENTED IN MEDICAL RECORD: ICD-10-PCS | Mod: 95,,, | Performed by: INTERNAL MEDICINE

## 2020-06-02 PROCEDURE — 99442 PR PHYSICIAN TELEPHONE EVALUATION 11-20 MIN: CPT | Mod: 95,,, | Performed by: INTERNAL MEDICINE

## 2020-06-02 PROCEDURE — 99442 PR PHYSICIAN TELEPHONE EVALUATION 11-20 MIN: ICD-10-PCS | Mod: 95,,, | Performed by: INTERNAL MEDICINE

## 2020-06-02 PROCEDURE — 1159F MED LIST DOCD IN RCRD: CPT | Mod: 95,,, | Performed by: INTERNAL MEDICINE

## 2020-06-02 RX ORDER — AZITHROMYCIN 250 MG/1
TABLET, FILM COATED ORAL
Qty: 6 TABLET | Refills: 0 | Status: SHIPPED | OUTPATIENT
Start: 2020-06-02 | End: 2021-05-04

## 2020-06-02 RX ORDER — HYDROCODONE BITARTRATE AND HOMATROPINE METHYLBROMIDE ORAL SOLUTION 5; 1.5 MG/5ML; MG/5ML
5 LIQUID ORAL 3 TIMES DAILY PRN
Qty: 120 ML | Refills: 0 | Status: SHIPPED | OUTPATIENT
Start: 2020-06-02 | End: 2020-06-24 | Stop reason: SDUPTHER

## 2020-06-02 NOTE — PROGRESS NOTES
Subjective:       Patient ID: Edie Hammond is a 89 y.o. female.    Chief Complaint: Cough    The patient location is: home  The chief complaint leading to consultation is: cough  Visit type: Virtual visit with audio  Total time spent with patient: 15 minutes------  Each patient to whom he or she provides medical services by telemedicine is:  (1) informed of the relationship between the physician and patient and the respective role of any other health care provider with respect to management of the patient; and (2) notified that he or she may decline to receive medical services by telemedicine and may withdraw from such care at any time.    Notes:       Cough   This is a recurrent problem. The current episode started 1 to 4 weeks ago. The problem has been gradually worsening. The problem occurs every few hours. The cough is productive of sputum. Associated symptoms include postnasal drip. Pertinent negatives include no chest pain, chills, fever, headaches, myalgias, sore throat, shortness of breath or wheezing.     Past Medical History:   Diagnosis Date    Diabetes     Glaucoma     Follows with Dr. James Reeves, ophthalmologist    Hand arthritis     Heart murmur     Follows with Dr. Faisal Milton, cardiology    HTN (hypertension)     Hypothyroidism     Postmenopausal     Seasonal allergies      Past Surgical History:   Procedure Laterality Date    bladder lift      CHOLECYSTECTOMY      CRANIOTOMY  2017    s/p fall    MYOMECTOMY      TOTAL ABDOMINAL HYSTERECTOMY W/ BILATERAL SALPINGOOPHORECTOMY      due to fibroid     Family History   Problem Relation Age of Onset    No Known Problems Son     Heart attack Mother     Diabetes Sister     Hypertension Sister     Hypertension Sister     Stroke Neg Hx      Social History     Socioeconomic History    Marital status:      Spouse name: Not on file    Number of children: Not on file    Years of education: Not on file    Highest  education level: Not on file   Occupational History    Occupation: Retired    Social Needs    Financial resource strain: Not hard at all    Food insecurity:     Worry: Never true     Inability: Never true    Transportation needs:     Medical: No     Non-medical: No   Tobacco Use    Smoking status: Never Smoker    Smokeless tobacco: Never Used   Substance and Sexual Activity    Alcohol use: Yes     Frequency: Never     Drinks per session: Patient refused     Binge frequency: Never    Drug use: Never    Sexual activity: Not on file   Lifestyle    Physical activity:     Days per week: 3 days     Minutes per session: 20 min    Stress: Only a little   Relationships    Social connections:     Talks on phone: More than three times a week     Gets together: Three times a week     Attends Hoahaoism service: Not on file     Active member of club or organization: Yes     Attends meetings of clubs or organizations: More than 4 times per year     Relationship status:    Other Topics Concern    Not on file   Social History Narrative    Not on file     Review of Systems   Constitutional: Negative for chills and fever.   HENT: Positive for congestion and postnasal drip. Negative for sore throat.    Respiratory: Positive for cough. Negative for apnea, choking, chest tightness, shortness of breath, wheezing and stridor.    Cardiovascular: Negative for chest pain and palpitations.   Gastrointestinal: Negative for abdominal pain, nausea and vomiting.   Musculoskeletal: Negative for myalgias.   Neurological: Negative for dizziness, weakness, light-headedness and headaches.   Psychiatric/Behavioral: Negative for agitation, behavioral problems and confusion.       Objective:      Physical Exam   Constitutional: She is oriented to person, place, and time.   Pulmonary/Chest: Effort normal.   Neurological: She is alert and oriented to person, place, and time.       CMP  Sodium   Date Value Ref Range  Status   01/07/2020 140 136 - 145 mmol/L Final     Potassium   Date Value Ref Range Status   01/07/2020 4.0 3.5 - 5.1 mmol/L Final     Chloride   Date Value Ref Range Status   01/07/2020 103 95 - 110 mmol/L Final     CO2   Date Value Ref Range Status   01/07/2020 29 23 - 29 mmol/L Final     Glucose   Date Value Ref Range Status   01/07/2020 95 70 - 110 mg/dL Final     BUN, Bld   Date Value Ref Range Status   01/07/2020 21 8 - 23 mg/dL Final     Creatinine   Date Value Ref Range Status   01/07/2020 0.8 0.5 - 1.4 mg/dL Final     Calcium   Date Value Ref Range Status   01/07/2020 10.0 8.7 - 10.5 mg/dL Final     Total Protein   Date Value Ref Range Status   01/07/2020 7.1 6.0 - 8.4 g/dL Final     Albumin   Date Value Ref Range Status   01/07/2020 3.8 3.5 - 5.2 g/dL Final     Total Bilirubin   Date Value Ref Range Status   01/07/2020 0.4 0.1 - 1.0 mg/dL Final     Comment:     For infants and newborns, interpretation of results should be based  on gestational age, weight and in agreement with clinical  observations.  Premature Infant recommended reference ranges:  Up to 24 hours.............<8.0 mg/dL  Up to 48 hours............<12.0 mg/dL  3-5 days..................<15.0 mg/dL  6-29 days.................<15.0 mg/dL       Alkaline Phosphatase   Date Value Ref Range Status   01/07/2020 62 55 - 135 U/L Final     AST   Date Value Ref Range Status   01/07/2020 18 10 - 40 U/L Final     ALT   Date Value Ref Range Status   01/07/2020 12 10 - 44 U/L Final     Anion Gap   Date Value Ref Range Status   01/07/2020 8 8 - 16 mmol/L Final     eGFR if    Date Value Ref Range Status   01/07/2020 >60.0 >60 mL/min/1.73 m^2 Final     eGFR if non    Date Value Ref Range Status   01/07/2020 >60.0 >60 mL/min/1.73 m^2 Final     Comment:     Calculation used to obtain the estimated glomerular filtration  rate (eGFR) is the CKD-EPI equation.        No results found for: WBC, HGB, HCT, MCV, PLT  Lab Results    Component Value Date    CHOL 205 (H) 01/07/2020     Lab Results   Component Value Date    HDL 83 (H) 01/07/2020     Lab Results   Component Value Date    LDLCALC 107.4 01/07/2020     Lab Results   Component Value Date    TRIG 73 01/07/2020     Lab Results   Component Value Date    CHOLHDL 40.5 01/07/2020     Lab Results   Component Value Date    TSH 1.815 05/12/2020     Lab Results   Component Value Date    HGBA1C 5.7 (H) 05/12/2020     Assessment:       1. Cough        Plan:   Cough---------no covid exposure-----------does not want to be checked for covid or get cxr at this time---------    Other orders  -     azithromycin (Z-CHAMP) 250 MG tablet; Take 2 tabs today then one tab daily for 4 days  Dispense: 6 tablet; Refill: 0  -     hydrocodone-homatropine 5-1.5 mg/5 ml (HYCODAN) 5-1.5 mg/5 mL Syrp; Take 5 mLs by mouth 3 (three) times daily as needed.  Dispense: 120 mL; Refill: 0    Call if persists-----------

## 2020-06-22 DIAGNOSIS — E11.59 HYPERTENSION ASSOCIATED WITH DIABETES: ICD-10-CM

## 2020-06-22 DIAGNOSIS — I15.2 HYPERTENSION ASSOCIATED WITH DIABETES: ICD-10-CM

## 2020-06-23 RX ORDER — HYDRALAZINE HYDROCHLORIDE 100 MG/1
TABLET, FILM COATED ORAL
Qty: 270 TABLET | Refills: 1 | Status: SHIPPED | OUTPATIENT
Start: 2020-06-23 | End: 2021-01-19

## 2020-06-24 DIAGNOSIS — R05.9 COUGH: Primary | ICD-10-CM

## 2020-06-24 RX ORDER — HYDROCODONE BITARTRATE AND HOMATROPINE METHYLBROMIDE ORAL SOLUTION 5; 1.5 MG/5ML; MG/5ML
5 LIQUID ORAL 3 TIMES DAILY PRN
Qty: 120 ML | Refills: 0 | Status: SHIPPED | OUTPATIENT
Start: 2020-06-24 | End: 2021-10-04

## 2020-06-24 NOTE — TELEPHONE ENCOUNTER
----- Message from Kalia Silvestre LPN sent at 6/24/2020  2:11 PM CDT -----  Regarding: FW: COVID-19  Contact: pt    ----- Message -----  From: Alison Garza  Sent: 6/24/2020  12:56 PM CDT  To: Edw[facility] ALICIA Staff  Subject: COVID-19                                         Pt stated she calling about a cough she had for a while and want to know about taking the COVID-19  she can be reached at [phone]

## 2020-06-24 NOTE — TELEPHONE ENCOUNTER
Pt states she is having some congestion and a cough foe a while and would like to see you today to possibly have the covid test done

## 2020-06-24 NOTE — TELEPHONE ENCOUNTER
Covid-19 order in. Please make sure it's ordered correctly and she is instructed to go to correct facility for testing. Thanks.

## 2020-06-24 NOTE — TELEPHONE ENCOUNTER
----- Message from Baldomero Roy sent at 6/24/2020  3:29 PM CDT -----  Regarding: Patient  The patient would like to find out if she can get some medication prescribed, she stated that she has a lot of phlegm coming up. I did inform the patient that Rx(hydrocodone-homatropine 5-1.5 mg/5 ml (HYCODAN) 5-1.5 mg/5 mL Syrp) was sent to the pharmacy. She would like to know if any additional medication can be prescribed. Please call back at [phone] (home)

## 2020-06-26 ENCOUNTER — TELEPHONE (OUTPATIENT)
Dept: FAMILY MEDICINE | Facility: CLINIC | Age: 85
End: 2020-06-26

## 2020-06-26 ENCOUNTER — CLINICAL SUPPORT (OUTPATIENT)
Dept: FAMILY MEDICINE | Facility: CLINIC | Age: 85
End: 2020-06-26
Payer: MEDICARE

## 2020-06-26 DIAGNOSIS — R05.9 COUGH: ICD-10-CM

## 2020-06-26 PROCEDURE — U0003 INFECTIOUS AGENT DETECTION BY NUCLEIC ACID (DNA OR RNA); SEVERE ACUTE RESPIRATORY SYNDROME CORONAVIRUS 2 (SARS-COV-2) (CORONAVIRUS DISEASE [COVID-19]), AMPLIFIED PROBE TECHNIQUE, MAKING USE OF HIGH THROUGHPUT TECHNOLOGIES AS DESCRIBED BY CMS-2020-01-R: HCPCS

## 2020-07-01 DIAGNOSIS — E11.9 TYPE 2 DIABETES MELLITUS WITHOUT COMPLICATION, WITHOUT LONG-TERM CURRENT USE OF INSULIN: ICD-10-CM

## 2020-07-01 LAB — SARS-COV-2 RNA RESP QL NAA+PROBE: NOT DETECTED

## 2020-07-02 ENCOUNTER — TELEPHONE (OUTPATIENT)
Dept: FAMILY MEDICINE | Facility: CLINIC | Age: 85
End: 2020-07-02

## 2020-07-02 DIAGNOSIS — R10.13 DYSPEPSIA: Primary | ICD-10-CM

## 2020-07-02 RX ORDER — PANTOPRAZOLE SODIUM 40 MG/1
40 TABLET, DELAYED RELEASE ORAL DAILY
Qty: 30 TABLET | Refills: 0 | Status: SHIPPED | OUTPATIENT
Start: 2020-07-02 | End: 2020-07-02

## 2020-07-02 RX ORDER — SITAGLIPTIN 100 MG/1
TABLET, FILM COATED ORAL
Qty: 90 TABLET | Refills: 1 | Status: SHIPPED | OUTPATIENT
Start: 2020-07-02 | End: 2020-12-29

## 2020-07-02 NOTE — TELEPHONE ENCOUNTER
----- Message from Shruti Batista sent at 7/2/2020  3:38 PM CDT -----  Regarding: cough  Contact: pt  Caller is requesting a call back regarding her cough not getting any better. Caller think that it maybe acid reflex related. Please call back at [phone]. Thanks.

## 2020-07-02 NOTE — TELEPHONE ENCOUNTER
Pt has bad cough productive with clear mucus   Has burning sensation in throat until she cough   Thinks it may be reflux   Cough med not working

## 2020-07-15 DIAGNOSIS — E03.9 HYPOTHYROIDISM, UNSPECIFIED TYPE: ICD-10-CM

## 2020-07-15 RX ORDER — LEVOTHYROXINE SODIUM 50 UG/1
TABLET ORAL
Qty: 90 TABLET | Refills: 1 | Status: SHIPPED | OUTPATIENT
Start: 2020-07-15 | End: 2021-01-19

## 2020-07-16 ENCOUNTER — TELEPHONE (OUTPATIENT)
Dept: FAMILY MEDICINE | Facility: CLINIC | Age: 85
End: 2020-07-16

## 2020-07-16 NOTE — TELEPHONE ENCOUNTER
----- Message from Anayeli Herrera sent at 7/16/2020  2:43 PM CDT -----  Contact: self/[phone]  Would like to consult with nurse regarding medication, patient states she has completed pantoprazole and told she needed to call in when done. Please call back at [phone]. Thanks/ar

## 2020-07-16 NOTE — TELEPHONE ENCOUNTER
Pt states the medication was perfect and magical    Pt states she no longer has a cough and she thinks the acid reflux was causing her to cough    Pt want to thank you for helping her

## 2020-07-29 ENCOUNTER — PATIENT OUTREACH (OUTPATIENT)
Dept: ADMINISTRATIVE | Facility: OTHER | Age: 85
End: 2020-07-29

## 2020-07-30 ENCOUNTER — OFFICE VISIT (OUTPATIENT)
Dept: PODIATRY | Facility: CLINIC | Age: 85
End: 2020-07-30
Payer: MEDICARE

## 2020-07-30 VITALS
WEIGHT: 138.44 LBS | HEIGHT: 59 IN | HEART RATE: 64 BPM | SYSTOLIC BLOOD PRESSURE: 142 MMHG | BODY MASS INDEX: 27.91 KG/M2 | DIASTOLIC BLOOD PRESSURE: 64 MMHG

## 2020-07-30 DIAGNOSIS — B35.1 DERMATOPHYTOSIS OF NAIL: ICD-10-CM

## 2020-07-30 DIAGNOSIS — E11.9 TYPE 2 DIABETES MELLITUS WITHOUT COMPLICATION, WITHOUT LONG-TERM CURRENT USE OF INSULIN: Primary | ICD-10-CM

## 2020-07-30 PROCEDURE — 99999 PR PBB SHADOW E&M-EST. PATIENT-LVL IV: ICD-10-PCS | Mod: PBBFAC,,, | Performed by: PODIATRIST

## 2020-07-30 PROCEDURE — 99203 OFFICE O/P NEW LOW 30 MIN: CPT | Mod: S$GLB,,, | Performed by: PODIATRIST

## 2020-07-30 PROCEDURE — 99999 PR PBB SHADOW E&M-EST. PATIENT-LVL IV: CPT | Mod: PBBFAC,,, | Performed by: PODIATRIST

## 2020-07-30 PROCEDURE — 99203 PR OFFICE/OUTPT VISIT, NEW, LEVL III, 30-44 MIN: ICD-10-PCS | Mod: S$GLB,,, | Performed by: PODIATRIST

## 2020-07-30 PROCEDURE — 1126F PR PAIN SEVERITY QUANTIFIED, NO PAIN PRESENT: ICD-10-PCS | Mod: S$GLB,,, | Performed by: PODIATRIST

## 2020-07-30 PROCEDURE — 1159F MED LIST DOCD IN RCRD: CPT | Mod: S$GLB,,, | Performed by: PODIATRIST

## 2020-07-30 PROCEDURE — 1126F AMNT PAIN NOTED NONE PRSNT: CPT | Mod: S$GLB,,, | Performed by: PODIATRIST

## 2020-07-30 PROCEDURE — 1101F PT FALLS ASSESS-DOCD LE1/YR: CPT | Mod: CPTII,S$GLB,, | Performed by: PODIATRIST

## 2020-07-30 PROCEDURE — 1101F PR PT FALLS ASSESS DOC 0-1 FALLS W/OUT INJ PAST YR: ICD-10-PCS | Mod: CPTII,S$GLB,, | Performed by: PODIATRIST

## 2020-07-30 PROCEDURE — 1159F PR MEDICATION LIST DOCUMENTED IN MEDICAL RECORD: ICD-10-PCS | Mod: S$GLB,,, | Performed by: PODIATRIST

## 2020-07-30 NOTE — PROGRESS NOTES
Subjective:     Patient ID: Edie Hammond is a 89 y.o. female.    Chief Complaint: Nail Problem (Thick nails/fungus  to multiplenails  Diabetic pt. Wears sandals. c/o no pain. .PCP DR Gary, last visit 7/2/2020)    Edie Almanzar is a 89 y.o. female who presents to the clinic upon referral from Dr. Gary  for evaluation and treatment of diabetic feet. Edie Almanzar has a past medical history of Diabetes, Glaucoma, Hand arthritis, Heart murmur, HTN (hypertension), Hypothyroidism, Postmenopausal, and Seasonal allergies. Patient relates no major problem with feet. Only complaints today consist of thick toenails.     Referring Provider: 07/02/2020      PCP: Erin Gary MD    Date Last Seen by PCP: 07/02/2020    Current shoe gear: Slip-on shoes    Hemoglobin A1C   Date Value Ref Range Status   05/12/2020 5.7 (H) 4.0 - 5.6 % Final     Comment:     ADA Screening Guidelines:  5.7-6.4%  Consistent with prediabetes  >or=6.5%  Consistent with diabetes  High levels of fetal hemoglobin interfere with the HbA1C  assay. Heterozygous hemoglobin variants (HbS, HgC, etc)do  not significantly interfere with this assay.   However, presence of multiple variants may affect accuracy.     01/07/2020 5.9 (H) 4.0 - 5.6 % Final     Comment:     ADA Screening Guidelines:  5.7-6.4%  Consistent with prediabetes  >or=6.5%  Consistent with diabetes  High levels of fetal hemoglobin interfere with the HbA1C  assay. Heterozygous hemoglobin variants (HbS, HgC, etc)do  not significantly interfere with this assay.   However, presence of multiple variants may affect accuracy.             Patient Active Problem List   Diagnosis    Hypertension associated with diabetes    Type 2 diabetes mellitus without complication, without long-term current use of insulin    Postmenopausal    Hypothyroidism    Pain of great toe    Cardiac murmur    Glaucoma       Medication List with Changes/Refills   Current Medications    AMLODIPINE (NORVASC) 10 MG  TABLET    Take 5 mg by mouth once daily.     AZITHROMYCIN (Z-CHAMP) 250 MG TABLET    Take 2 tabs today then one tab daily for 4 days    BLOOD SUGAR DIAGNOSTIC (BLOOD GLUCOSE TEST) STRP    Use twice daily prn. Dx: E11.9    BLOOD-GLUCOSE METER KIT    Use as instructed - dx: E11.9    BUMETANIDE (BUMEX) 1 MG TABLET    But also states some times takes 3 times per week    CETIRIZINE (ZYRTEC) 10 MG TABLET    Take 10 mg by mouth once daily.     CLONIDINE (CATAPRES) 0.1 MG TABLET    Take 0.1 mg by mouth daily as needed (takes only if SBP > 180 per cardiologist).     DORZOLAMIDE-TIMOLOL 2-0.5% (COSOPT) 22.3-6.8 MG/ML OPHTHALMIC SOLUTION    1 drop.    FLUTICASONE PROPIONATE (FLONASE) 50 MCG/ACTUATION NASAL SPRAY    2 sprays by Each Nostril route daily as needed for Rhinitis.    HYDRALAZINE (APRESOLINE) 100 MG TABLET    TAKE ONE TABLET BY MOUTH THREE TIMES DAILY     HYDROCODONE-HOMATROPINE 5-1.5 MG/5 ML (HYCODAN) 5-1.5 MG/5 ML SYRP    Take 5 mLs by mouth 3 (three) times daily as needed.    JANUVIA 100 MG TAB    TAKE ONE TABLET BY MOUTH ONE TIME DAILY     LANCETS MISC    Use twice daily prn. Dx: E11.9    LEVOTHYROXINE (SYNTHROID) 50 MCG TABLET    TAKE ONE TABLET BY MOUTH BEFORE BREAKFAST     LOSARTAN (COZAAR) 100 MG TABLET    Take 100 mg by mouth once daily.    MULTIVITAMIN WITH MINERALS TABLET    Take 1 tablet by mouth once daily.     PANTOPRAZOLE (PROTONIX) 40 MG TABLET    TAKE 1 TABLET(40 MG) BY MOUTH EVERY DAY   Changed and/or Refilled Medications    Modified Medication Previous Medication    NYSTATIN (MYCOSTATIN) CREAM nystatin (MYCOSTATIN) cream       Apply topically 2 (two) times daily as needed (irritated skin).    Apply topically 2 (two) times daily as needed (irritated skin).       Review of patient's allergies indicates:   Allergen Reactions    Amlodipine besylate Edema    Metformin hcl Diarrhea       Past Surgical History:   Procedure Laterality Date    bladder lift      CHOLECYSTECTOMY      CRANIOTOMY  2017     "s/p fall    MYOMECTOMY      TOTAL ABDOMINAL HYSTERECTOMY W/ BILATERAL SALPINGOOPHORECTOMY      due to fibroid       Family History   Problem Relation Age of Onset    No Known Problems Son     Heart attack Mother     Diabetes Sister     Hypertension Sister     Hypertension Sister     Stroke Neg Hx        Social History     Socioeconomic History    Marital status:      Spouse name: Not on file    Number of children: Not on file    Years of education: Not on file    Highest education level: Not on file   Occupational History    Occupation: Retired    Social Needs    Financial resource strain: Not hard at all    Food insecurity     Worry: Never true     Inability: Never true    Transportation needs     Medical: No     Non-medical: No   Tobacco Use    Smoking status: Never Smoker    Smokeless tobacco: Never Used   Substance and Sexual Activity    Alcohol use: Yes     Frequency: Never     Drinks per session: Patient refused     Binge frequency: Never    Drug use: Never    Sexual activity: Not on file   Lifestyle    Physical activity     Days per week: 3 days     Minutes per session: 20 min    Stress: Only a little   Relationships    Social connections     Talks on phone: More than three times a week     Gets together: Three times a week     Attends [employer] service: Not on file     Active member of club or organization: Yes     Attends meetings of clubs or organizations: More than 4 times per year     Relationship status:    Other Topics Concern    Not on file   Social History Narrative    Not on file       Vitals:    07/30/20 1043   BP: (!) 142/64   Pulse: 64   Weight: 62.8 kg (138 lb 7.2 oz)   Height: 4' 11.35" (1.507 m)   PainSc: 0-No pain       Hemoglobin A1C   Date Value Ref Range Status   05/12/2020 5.7 (H) 4.0 - 5.6 % Final     Comment:     ADA Screening Guidelines:  5.7-6.4%  Consistent with prediabetes  >or=6.5%  Consistent with diabetes  High levels of " fetal hemoglobin interfere with the HbA1C  assay. Heterozygous hemoglobin variants (HbS, HgC, etc)do  not significantly interfere with this assay.   However, presence of multiple variants may affect accuracy.     01/07/2020 5.9 (H) 4.0 - 5.6 % Final     Comment:     ADA Screening Guidelines:  5.7-6.4%  Consistent with prediabetes  >or=6.5%  Consistent with diabetes  High levels of fetal hemoglobin interfere with the HbA1C  assay. Heterozygous hemoglobin variants (HbS, HgC, etc)do  not significantly interfere with this assay.   However, presence of multiple variants may affect accuracy.         Review of Systems   Constitutional: Negative for chills and fever.   Respiratory: Negative for shortness of breath.    Cardiovascular: Negative for chest pain, palpitations, orthopnea, claudication and leg swelling.   Gastrointestinal: Negative for diarrhea, nausea and vomiting.   Musculoskeletal: Negative for joint pain.   Skin: Negative for rash.   Neurological: Negative for dizziness, tingling, sensory change, focal weakness and weakness.   Psychiatric/Behavioral: Negative.            Objective:      PHYSICAL EXAM: Apperance: Alert and orient in no distress,well developed, and with good attention to grooming and body habits  Patient presents ambulating in sandals.   LOWER EXTREMITY EXAM:  VASCULAR: Dorsalis pedis pulses 2/4 bilateral and Posterior Tibial pulses 2/4 bilateral. Capillary fill time <4 seconds bilateral. No edema observed bilateral. Varicosities absent bilateral. Skin temperature of the lower extremities is warm to warm, proximal to distal. Hair growth dim bilateral.  DERMATOLOGICAL: No skin rashes, subcutaneous nodules, lesions, or ulcers observed bilateral. Nails 1,2 bilateral thickened. Nails 3,4,5 bilateral normal length and thickness.  Webspaces 1,2,3,4 bilateral clean, dry and without evidence of break in skin integrity.   NEUROLOGICAL: Light touch, sharp-dull, proprioception all present and equal  bilaterally.  Vibratory sensation intact at bilateral hallux. Protective sensation intact at all 10 sites as tested with a Compton-Eben 5.07 monofilament.   MUSCULOSKELETAL: Muscle strength is 5/5 for foot inverters, everters, plantarflexors, and dorsiflexors. Muscle tone is normal. No pain on palpation of bilateral nails.           Assessment:       Encounter Diagnoses   Name Primary?    Type 2 diabetes mellitus without complication, without long-term current use of insulin Yes    Dermatophytosis of nail          Plan:   Type 2 diabetes mellitus without complication, without long-term current use of insulin    Dermatophytosis of nail      I counseled the patient on her conditions, regarding findings of my examination, my impressions, and usual treatment plan.   Greater than 50% of this visit spent on counseling and coordination of care.  Greater than 15 minutes of a 20 minute appointment spent on education about the diabetic foot, neuropathy, and prevention of limb loss.  Shoe inspection. Diabetic Foot Education. Patient reminded of the importance of good nutrition and blood sugar control to help prevent podiatric complications of diabetes. Patient instructed on proper foot hygeine. We discussed wearing proper shoe gear, daily foot inspections, never walking without protective shoe gear, never putting sharp instruments to feet.    Patient instructed to spray all shoes with Lysol disinfectant spray and let dry before wearing. Patient instructed to wash all socks in hot water and bleach.  Discuss treatment options for nail fungus.  I explained that fungus lives in a warm dark moist environment and therefore patient should make every attempt to keep feet clean and dry.  We discussed drying feet thoroughly after shower particularly between the toes and then applying powder between the toes and in the shoes.    For fungal toenails I prescribed topical medication to be used daily for up to a year.  We also  discussed oral Lamisil but I did not recommend it as a first line of treatment since it is an internal medicine that may potentially have side effects, including liver problems. Patient elects for OTC topical treatment.   Patient  will continue to monitor the areas daily, inspect feet, wear protective shoe gear when ambulatory, moisturizer to maintain skin integrity. Patient reminded of the importance of good nutrition and blood sugar control to help prevent podiatric complications of diabetes.  Patient to return 12 months or sooner if needed.             Sheryl Hoskins DPM  [facility]

## 2020-07-30 NOTE — LETTER
August 9, 2020      Erin Gary MD  [address] [place] [address]           [facility]  [address]  [place] [address]  Phone: [phone]  Fax: [phone]          Patient: Edie Hammond   MR Number: [id number]   YOB: 1930   Date of Visit: 7/30/2020       Dear Dr. Erin Gary:    Thank you for referring Edie Hammond to me for evaluation. Attached you will find relevant portions of my assessment and plan of care.    If you have questions, please do not hesitate to call me. I look forward to following Edie Hammond along with you.    Sincerely,    Sheryl Hoskins DPM    Enclosure  CC:  No Recipients    If you would like to receive this communication electronically, please contact [email or web address] or [phone] to request more information on [employer] Link access.    For providers and/or their staff who would like to refer a patient to [facility], please contact us through our one-stop-shop provider referral line, [facility], at [phone].    If you feel you have received this communication in error or would no longer like to receive these types of communications, please e-mail [email or web address]

## 2020-08-17 ENCOUNTER — LAB VISIT (OUTPATIENT)
Dept: LAB | Facility: HOSPITAL | Age: 85
End: 2020-08-17
Attending: FAMILY MEDICINE
Payer: MEDICARE

## 2020-08-17 ENCOUNTER — OFFICE VISIT (OUTPATIENT)
Dept: FAMILY MEDICINE | Facility: CLINIC | Age: 85
End: 2020-08-17
Payer: MEDICARE

## 2020-08-17 VITALS
BODY MASS INDEX: 27.69 KG/M2 | TEMPERATURE: 98 F | RESPIRATION RATE: 16 BRPM | WEIGHT: 137.38 LBS | HEIGHT: 59 IN | OXYGEN SATURATION: 97 % | SYSTOLIC BLOOD PRESSURE: 138 MMHG | HEART RATE: 60 BPM | DIASTOLIC BLOOD PRESSURE: 62 MMHG

## 2020-08-17 DIAGNOSIS — E11.59 HYPERTENSION ASSOCIATED WITH DIABETES: ICD-10-CM

## 2020-08-17 DIAGNOSIS — Z78.0 POSTMENOPAUSAL: ICD-10-CM

## 2020-08-17 DIAGNOSIS — R42 VERTIGO: ICD-10-CM

## 2020-08-17 DIAGNOSIS — H40.9 GLAUCOMA, UNSPECIFIED GLAUCOMA TYPE, UNSPECIFIED LATERALITY: ICD-10-CM

## 2020-08-17 DIAGNOSIS — I15.2 HYPERTENSION ASSOCIATED WITH DIABETES: ICD-10-CM

## 2020-08-17 DIAGNOSIS — E03.9 HYPOTHYROIDISM, UNSPECIFIED TYPE: ICD-10-CM

## 2020-08-17 DIAGNOSIS — Z12.31 VISIT FOR SCREENING MAMMOGRAM: ICD-10-CM

## 2020-08-17 DIAGNOSIS — E11.9 TYPE 2 DIABETES MELLITUS WITHOUT COMPLICATION, WITHOUT LONG-TERM CURRENT USE OF INSULIN: ICD-10-CM

## 2020-08-17 DIAGNOSIS — E66.3 OVERWEIGHT (BMI 25.0-29.9): ICD-10-CM

## 2020-08-17 DIAGNOSIS — Z00.00 ANNUAL PHYSICAL EXAM: ICD-10-CM

## 2020-08-17 LAB
ALBUMIN SERPL BCP-MCNC: 3.8 G/DL (ref 3.5–5.2)
ALP SERPL-CCNC: 68 U/L (ref 55–135)
ALT SERPL W/O P-5'-P-CCNC: 10 U/L (ref 10–44)
ANION GAP SERPL CALC-SCNC: 9 MMOL/L (ref 8–16)
AST SERPL-CCNC: 20 U/L (ref 10–40)
BASOPHILS # BLD AUTO: 0.07 K/UL (ref 0–0.2)
BASOPHILS NFR BLD: 0.8 % (ref 0–1.9)
BILIRUB SERPL-MCNC: 0.4 MG/DL (ref 0.1–1)
BUN SERPL-MCNC: 19 MG/DL (ref 8–23)
CALCIUM SERPL-MCNC: 9.9 MG/DL (ref 8.7–10.5)
CHLORIDE SERPL-SCNC: 103 MMOL/L (ref 95–110)
CO2 SERPL-SCNC: 27 MMOL/L (ref 23–29)
CREAT SERPL-MCNC: 0.7 MG/DL (ref 0.5–1.4)
DIFFERENTIAL METHOD: ABNORMAL
EOSINOPHIL # BLD AUTO: 0.1 K/UL (ref 0–0.5)
EOSINOPHIL NFR BLD: 1.7 % (ref 0–8)
ERYTHROCYTE [DISTWIDTH] IN BLOOD BY AUTOMATED COUNT: 14.6 % (ref 11.5–14.5)
EST. GFR  (AFRICAN AMERICAN): >60 ML/MIN/1.73 M^2
EST. GFR  (NON AFRICAN AMERICAN): >60 ML/MIN/1.73 M^2
GLUCOSE SERPL-MCNC: 75 MG/DL (ref 70–110)
HCT VFR BLD AUTO: 39.2 % (ref 37–48.5)
HGB BLD-MCNC: 12.2 G/DL (ref 12–16)
IMM GRANULOCYTES # BLD AUTO: 0.04 K/UL (ref 0–0.04)
IMM GRANULOCYTES NFR BLD AUTO: 0.5 % (ref 0–0.5)
LYMPHOCYTES # BLD AUTO: 1.9 K/UL (ref 1–4.8)
LYMPHOCYTES NFR BLD: 22.1 % (ref 18–48)
MCH RBC QN AUTO: 29.2 PG (ref 27–31)
MCHC RBC AUTO-ENTMCNC: 31.1 G/DL (ref 32–36)
MCV RBC AUTO: 94 FL (ref 82–98)
MONOCYTES # BLD AUTO: 1.1 K/UL (ref 0.3–1)
MONOCYTES NFR BLD: 13.3 % (ref 4–15)
NEUTROPHILS # BLD AUTO: 5.2 K/UL (ref 1.8–7.7)
NEUTROPHILS NFR BLD: 61.6 % (ref 38–73)
NRBC BLD-RTO: 0 /100 WBC
PLATELET # BLD AUTO: 313 K/UL (ref 150–350)
PMV BLD AUTO: 11.1 FL (ref 9.2–12.9)
POTASSIUM SERPL-SCNC: 4.2 MMOL/L (ref 3.5–5.1)
PROT SERPL-MCNC: 7.3 G/DL (ref 6–8.4)
RBC # BLD AUTO: 4.18 M/UL (ref 4–5.4)
SODIUM SERPL-SCNC: 139 MMOL/L (ref 136–145)
TSH SERPL DL<=0.005 MIU/L-ACNC: 2.17 UIU/ML (ref 0.4–4)
WBC # BLD AUTO: 8.48 K/UL (ref 3.9–12.7)

## 2020-08-17 PROCEDURE — 82570 ASSAY OF URINE CREATININE: CPT

## 2020-08-17 PROCEDURE — 83036 HEMOGLOBIN GLYCOSYLATED A1C: CPT

## 2020-08-17 PROCEDURE — 99999 PR PBB SHADOW E&M-EST. PATIENT-LVL V: CPT | Mod: PBBFAC,,, | Performed by: FAMILY MEDICINE

## 2020-08-17 PROCEDURE — 80053 COMPREHEN METABOLIC PANEL: CPT

## 2020-08-17 PROCEDURE — 99397 PER PM REEVAL EST PAT 65+ YR: CPT | Mod: S$GLB,,, | Performed by: FAMILY MEDICINE

## 2020-08-17 PROCEDURE — 85025 COMPLETE CBC W/AUTO DIFF WBC: CPT

## 2020-08-17 PROCEDURE — 84443 ASSAY THYROID STIM HORMONE: CPT

## 2020-08-17 PROCEDURE — 36415 COLL VENOUS BLD VENIPUNCTURE: CPT | Mod: PO

## 2020-08-17 PROCEDURE — 99397 PR PREVENTIVE VISIT,EST,65 & OVER: ICD-10-PCS | Mod: S$GLB,,, | Performed by: FAMILY MEDICINE

## 2020-08-17 PROCEDURE — 99999 PR PBB SHADOW E&M-EST. PATIENT-LVL V: ICD-10-PCS | Mod: PBBFAC,,, | Performed by: FAMILY MEDICINE

## 2020-08-17 RX ORDER — MECLIZINE HCL 12.5 MG 12.5 MG/1
TABLET ORAL
Qty: 30 TABLET | Refills: 0 | Status: SHIPPED | OUTPATIENT
Start: 2020-08-17 | End: 2021-05-04

## 2020-08-17 NOTE — PROGRESS NOTES
HISTORY OF PRESENT ILLNESS: Ms. Hammond comes in today not fasting and with taking medication today.     END OF LIFE DECISION: She does have a living will. She does not desire life support.    Current Outpatient Medications   Medication Sig    amLODIPine (NORVASC) 10 MG tablet Take 5 mg by mouth once daily.     azithromycin (Z-CHAMP) 250 MG tablet Take 2 tabs today then one tab daily for 4 days    blood sugar diagnostic (BLOOD GLUCOSE TEST) Strp Use twice daily prn. Dx: E11.9    blood-glucose meter kit Use as instructed - dx: E11.9    bumetanide (BUMEX) 1 MG tablet But also states some times takes 3 times per week    cetirizine (ZYRTEC) 10 MG tablet Take 10 mg by mouth once daily.     cloNIDine (CATAPRES) 0.1 MG tablet Take 0.1 mg by mouth daily as needed (takes only if SBP > 180 per cardiologist).     dorzolamide-timolol 2-0.5% (COSOPT) 22.3-6.8 mg/mL ophthalmic solution 1 drop.    fluticasone propionate (FLONASE) 50 mcg/actuation nasal spray 2 sprays by Each Nostril route daily as needed for Rhinitis.    hydrALAZINE (APRESOLINE) 100 MG tablet TAKE ONE TABLET BY MOUTH THREE TIMES DAILY     hydrocodone-homatropine 5-1.5 mg/5 ml (HYCODAN) 5-1.5 mg/5 mL Syrp Take 5 mLs by mouth 3 (three) times daily as needed.    JANUVIA 100 mg Tab TAKE ONE TABLET BY MOUTH ONE TIME DAILY     lancets Misc Use twice daily prn. Dx: E11.9    levothyroxine (SYNTHROID) 50 MCG tablet TAKE ONE TABLET BY MOUTH BEFORE BREAKFAST     losartan (COZAAR) 100 MG tablet Take 100 mg by mouth once daily.    multivitamin with minerals tablet Take 1 tablet by mouth once daily.     nystatin (MYCOSTATIN) cream Apply topically 2 (two) times daily as needed (irritated skin).    pantoprazole (PROTONIX) 40 MG tablet TAKE 1 TABLET(40 MG) BY MOUTH EVERY DAY     SCREENINGS:    Lab Results   Component Value Date    LDLCALC 107.4 01/07/2020     Health Maintenance Topics with due status: Not Due       Topic Last Completion Date    Influenza Vaccine  10/12/2019    Lipid Panel 01/07/2020    Hemoglobin A1c 05/12/2020    Foot Exam 07/30/2020   Eye Exam: Follows with Dr. Sidney Reeves on August 3, 2020.  GYN Exam (breast): In the past per patient.  Dental Exam: Fall 2019 per patient. Due.   PPD: Negative in the past per patient.      Immunizations:    Immunization History   Administered Date(s) Administered    Influenza 10/12/2019    Influenza - High Dose - PF (65 years and older) 09/21/2016    Influenza - Trivalent (ADULT) 09/18/2017, 09/30/2019    Influenza Split 09/01/2015    Pneumococcal Conjugate - 13 Valent 08/10/2017    Pneumococcal Polysaccharide - 23 Valent 11/01/2015    Zoster 12/16/2015   Tdap: > 10 years ago per patient. Advised patient insurance-covered benefit only at local pharmacy.  Shingrix: Never. Advised patient insurance-covered benefit only at local pharmacy.                                           ROS:  GENERAL: Denies fever, chills, fatigue or unusual weight change. Appetite normal. Exercises does. Monitors diet does.  SKIN: Denies rashes, itching, changes in mole, color or texture of skin or easy bruising.  HEAD:  Denies headaches or recent head trauma.  EYES: Denies change in vision, pain, diplopia, redness or discharge.  EARS: Denies ear pain, discharge or decreased hearing. Reports occasional vertigo (only with lying down) and requests refill of Meclizine 12.5 mg 1/2 to 1 pill twice daily prn vertigo prescribed by Dr. Darlene Null, ENT, in 2016.  NOSE: Denies loss of smell, epistaxis or rhinitis.  MOUTH & THROAT: Denies hoarseness or change in voice. Denies excessive gum bleeding or mouth sores.  Denies sore throat.  NODES: Denies swollen glands.  CHEST: Denies JANG, wheezing, cough, or sputum production.  CARDIOVASCULAR: Denies chest pain, PND, orthopnea or reduced exercise tolerance.  Denies palpitations. Reports saw Dr. Faisal Milton, cardiologist, appx. 1 month ago with scheduled follow up for December 2020.  ABDOMEN: Denies  "diarrhea, constipation, nausea, vomiting, abdominal pain, or blood in stool.  URINARY: Denies flank pain, dysuria or hematuria.  GENITOURINARY: Denies flank pain, dysuria, frequency or hematuria. No change in menses. Performs monthly breast exams some times.  ENDOCRINE: Denies, diabetes, thyroid, cholesterol problems. Performs home glucose checks twice daily with levels ranging 's.  HEME/LYMPH: Denies bleeding problems.  PERIPHERAL VASCULAR:Denies claudication or cyanosis.  MUSCULOSKELETAL: Denies joint stiffness, pain or swelling except chronic, intermittent left shoulder pain s/p falls in the past. Denies edema.  NEUROLOGIC: Denies history of seizures, tremors, paralysis, alteration of gait or coordination.  PSYCHIATRIC: Denies mood swings, depression anxiety, homicidal or suicidal thoughts. Denies sleep problems.    PE:   VS: /62   Pulse 60   Temp 98.1 °F (36.7 °C) (Temporal)   Resp 16   Ht 4' 11" (1.499 m)   Wt 62.3 kg (137 lb 5.6 oz)   SpO2 97%   BMI 27.74 kg/m²   APPEARANCE:  Well nourished, well developed female, pleasant, elderly, and overweight, alert and oriented in no acute distress.    HEAD: Nontender. Full range of motion.  EYES: PERRL, conjunctiva pink, lids no edema.   EARS: External canal patent, no swelling or redness. TM's shiny and clear.  NOSE: Mucosa and turbinates pink, not swollen. No discharge. Nontender sinuses.  THROAT: No pharyngeal erythema or exudate. No stridor.   NECK: Supple, no mass, thyroid not enlarged.  NODES: No cervical lymph node enlargement.  CHEST: Normal respiratory effort. Lungs clear to auscultation.  CARDIOVASCULAR: Normal S1, S2. No rubs or gallops. Chronic murmur noted. PMI not displaced. No carotid bruit. Pedal pulses palpable bilaterally. No edema.  ABDOMEN: Bowel sounds present. Not distended. Soft. No tenderness, masses or organomegaly.  BREAST EXAM: Not examined per patient request.  PELVIC EXAM: Not examined per patient request.  RECTAL EXAM: " Not examined per patient request.  MUSCULOSKELETAL: No joint deformities or stiffness. She is ambulatory without problems.  SKIN: No rashes or suspicious lesions, normal color and turgor. Benign-appearing seborrheic keratosis at mid-upper back noted.  NEUROLOGIC:   Cranial Nerves: II-XII grossly intact.   DTR's: Knees, Ankles 2+ and equal bilaterally. Gait & Posture: Normal gait and fine motion.  PSYCHIATRIC:Patient alert, oriented x 3. Mood/Affect normal without acute anxiety or depression noted. Judgment/insight good as she makes appropriate decisions during today's exam.    Protective Sensation (w/ 10 gram monofilament):  Right: Intact  Left: Intact    Visual Inspection:  Normal -  Bilateral    Pedal Pulses:   Right: Present  Left: Present    Posterior tibialis:   Right:Present  Left: Present       ASSESSMENT:    ICD-10-CM ICD-9-CM    1. Annual physical exam  Z00.00 V70.0    2. Hypertension associated with diabetes  E11.59 250.80 Comprehensive metabolic panel    I10 401.9 CBC auto differential      TSH   3. Type 2 diabetes mellitus without complication, without long-term current use of insulin  E11.9 250.00 Protein / creatinine ratio, urine      Hemoglobin A1C   4. Hypothyroidism, unspecified type  E03.9 244.9 TSH   5. Glaucoma, unspecified glaucoma type, unspecified laterality  H40.9 365.9    6. Vertigo  R42 780.4 meclizine (ANTIVERT) 12.5 mg tablet   7. Overweight (BMI 25.0-29.9)  E66.3 278.02    8. Postmenopausal  Z78.0 V49.81    9. Visit for screening mammogram  Z12.31 V76.12 Mammo Digital Screening Bilat       PLAN:  1. Age-appropriate counseling-appropriate low-sodium, low-cholesterol, low carbohydrate diet and exercise daily, monthly breast self exam, annual wellness examination.   2. Patient advised to call for results.  3. Continue current medications.  4. Keep follow up with specialists.  5. Flu shot this fall.  6. Prescription refill as noted above.  7. Follow up in about 3 months (around 11/17/2020)  for diabetes follow up.     Answers for HPI/ROS submitted by the patient on 8/14/2020   activity change: No  unexpected weight change: No  neck pain: No  hearing loss: No  rhinorrhea: No  trouble swallowing: No  eye discharge: No  visual disturbance: No  chest tightness: No  wheezing: No  chest pain: No  palpitations: No  blood in stool: No  constipation: No  vomiting: No  diarrhea: No  polydipsia: No  polyuria: No  difficulty urinating: No  hematuria: No  menstrual problem: No  dysuria: No  joint swelling: No  arthralgias: Yes  headaches: No  weakness: No  confusion: No  dysphoric mood: No

## 2020-08-18 LAB
CREAT UR-MCNC: 27 MG/DL (ref 15–325)
ESTIMATED AVG GLUCOSE: 117 MG/DL (ref 68–131)
HBA1C MFR BLD HPLC: 5.7 % (ref 4–5.6)
PROT UR-MCNC: <7 MG/DL (ref 0–15)
PROT/CREAT UR: NORMAL MG/G{CREAT} (ref 0–0.2)

## 2020-08-20 ENCOUNTER — PATIENT OUTREACH (OUTPATIENT)
Dept: ADMINISTRATIVE | Facility: HOSPITAL | Age: 85
End: 2020-08-20

## 2020-09-22 ENCOUNTER — IMMUNIZATION (OUTPATIENT)
Dept: FAMILY MEDICINE | Facility: CLINIC | Age: 85
End: 2020-09-22
Payer: MEDICARE

## 2020-09-22 PROCEDURE — G0008 ADMIN INFLUENZA VIRUS VAC: HCPCS | Mod: S$GLB,,, | Performed by: FAMILY MEDICINE

## 2020-09-22 PROCEDURE — 90662 IIV NO PRSV INCREASED AG IM: CPT | Mod: S$GLB,,, | Performed by: FAMILY MEDICINE

## 2020-09-22 PROCEDURE — 90662 FLU VACCINE - QUADRIVALENT - HIGH DOSE (65+) PRESERVATIVE FREE IM: ICD-10-PCS | Mod: S$GLB,,, | Performed by: FAMILY MEDICINE

## 2020-09-22 PROCEDURE — G0008 FLU VACCINE - QUADRIVALENT - HIGH DOSE (65+) PRESERVATIVE FREE IM: ICD-10-PCS | Mod: S$GLB,,, | Performed by: FAMILY MEDICINE

## 2020-10-13 ENCOUNTER — HOSPITAL ENCOUNTER (OUTPATIENT)
Dept: RADIOLOGY | Facility: HOSPITAL | Age: 85
Discharge: HOME | End: 2020-10-13
Attending: FAMILY MEDICINE
Payer: MEDICARE

## 2020-10-13 VITALS — BODY MASS INDEX: 27.69 KG/M2 | WEIGHT: 137.38 LBS | HEIGHT: 59 IN

## 2020-10-13 DIAGNOSIS — Z12.31 VISIT FOR SCREENING MAMMOGRAM: ICD-10-CM

## 2020-10-13 PROCEDURE — 77063 BREAST TOMOSYNTHESIS BI: CPT | Mod: 26,,, | Performed by: RADIOLOGY

## 2020-10-13 PROCEDURE — 77067 SCR MAMMO BI INCL CAD: CPT | Mod: 26,,, | Performed by: RADIOLOGY

## 2020-10-13 PROCEDURE — 77063 MAMMO DIGITAL SCREENING BILAT WITH TOMO: ICD-10-PCS | Mod: 26,,, | Performed by: RADIOLOGY

## 2020-10-13 PROCEDURE — 77067 SCR MAMMO BI INCL CAD: CPT | Mod: TC

## 2020-10-13 PROCEDURE — 77067 MAMMO DIGITAL SCREENING BILAT WITH TOMO: ICD-10-PCS | Mod: 26,,, | Performed by: RADIOLOGY

## 2020-10-27 ENCOUNTER — HOSPITAL ENCOUNTER (OUTPATIENT)
Dept: RADIOLOGY | Facility: HOSPITAL | Age: 85
Discharge: HOME | End: 2020-10-27
Attending: FAMILY MEDICINE
Payer: MEDICARE

## 2020-10-27 DIAGNOSIS — R92.8 ABNORMAL MAMMOGRAM: ICD-10-CM

## 2020-10-27 PROCEDURE — 76642 ULTRASOUND BREAST LIMITED: CPT | Mod: TC,RT

## 2020-10-27 PROCEDURE — 77065 MAMMO DIGITAL DIAGNOSTIC RIGHT WITH TOMO: ICD-10-PCS | Mod: 26,RT,, | Performed by: RADIOLOGY

## 2020-10-27 PROCEDURE — 76642 US BREAST RIGHT LIMITED: ICD-10-PCS | Mod: 26,RT,, | Performed by: RADIOLOGY

## 2020-10-27 PROCEDURE — 76642 ULTRASOUND BREAST LIMITED: CPT | Mod: 26,RT,, | Performed by: RADIOLOGY

## 2020-10-27 PROCEDURE — 77065 DX MAMMO INCL CAD UNI: CPT | Mod: TC,RT

## 2020-10-27 PROCEDURE — 77061 MAMMO DIGITAL DIAGNOSTIC RIGHT WITH TOMO: ICD-10-PCS | Mod: 26,RT,, | Performed by: RADIOLOGY

## 2020-10-27 PROCEDURE — 77061 BREAST TOMOSYNTHESIS UNI: CPT | Mod: 26,RT,, | Performed by: RADIOLOGY

## 2020-10-27 PROCEDURE — 77061 BREAST TOMOSYNTHESIS UNI: CPT | Mod: TC,RT

## 2020-10-27 PROCEDURE — 77065 DX MAMMO INCL CAD UNI: CPT | Mod: 26,RT,, | Performed by: RADIOLOGY

## 2020-11-17 ENCOUNTER — OFFICE VISIT (OUTPATIENT)
Dept: FAMILY MEDICINE | Facility: CLINIC | Age: 85
End: 2020-11-17
Payer: MEDICARE

## 2020-11-17 ENCOUNTER — LAB VISIT (OUTPATIENT)
Dept: LAB | Facility: HOSPITAL | Age: 85
End: 2020-11-17
Attending: FAMILY MEDICINE
Payer: MEDICARE

## 2020-11-17 VITALS
OXYGEN SATURATION: 98 % | TEMPERATURE: 98 F | WEIGHT: 142 LBS | SYSTOLIC BLOOD PRESSURE: 130 MMHG | HEART RATE: 62 BPM | DIASTOLIC BLOOD PRESSURE: 78 MMHG | HEIGHT: 59 IN | BODY MASS INDEX: 28.63 KG/M2

## 2020-11-17 DIAGNOSIS — E11.9 TYPE 2 DIABETES MELLITUS WITHOUT COMPLICATION, WITHOUT LONG-TERM CURRENT USE OF INSULIN: ICD-10-CM

## 2020-11-17 DIAGNOSIS — E11.59 HYPERTENSION ASSOCIATED WITH DIABETES: ICD-10-CM

## 2020-11-17 DIAGNOSIS — H40.9 GLAUCOMA, UNSPECIFIED GLAUCOMA TYPE, UNSPECIFIED LATERALITY: ICD-10-CM

## 2020-11-17 DIAGNOSIS — E11.9 TYPE 2 DIABETES MELLITUS WITHOUT COMPLICATION, WITHOUT LONG-TERM CURRENT USE OF INSULIN: Primary | ICD-10-CM

## 2020-11-17 DIAGNOSIS — I15.2 HYPERTENSION ASSOCIATED WITH DIABETES: ICD-10-CM

## 2020-11-17 DIAGNOSIS — E66.3 OVERWEIGHT (BMI 25.0-29.9): ICD-10-CM

## 2020-11-17 DIAGNOSIS — E03.9 HYPOTHYROIDISM, UNSPECIFIED TYPE: ICD-10-CM

## 2020-11-17 PROCEDURE — 1126F AMNT PAIN NOTED NONE PRSNT: CPT | Mod: S$GLB,,, | Performed by: FAMILY MEDICINE

## 2020-11-17 PROCEDURE — 99499 UNLISTED E&M SERVICE: CPT | Mod: S$GLB,,, | Performed by: FAMILY MEDICINE

## 2020-11-17 PROCEDURE — 99214 PR OFFICE/OUTPT VISIT, EST, LEVL IV, 30-39 MIN: ICD-10-PCS | Mod: S$GLB,,, | Performed by: FAMILY MEDICINE

## 2020-11-17 PROCEDURE — 99999 PR PBB SHADOW E&M-EST. PATIENT-LVL V: ICD-10-PCS | Mod: PBBFAC,,, | Performed by: FAMILY MEDICINE

## 2020-11-17 PROCEDURE — 1126F PR PAIN SEVERITY QUANTIFIED, NO PAIN PRESENT: ICD-10-PCS | Mod: S$GLB,,, | Performed by: FAMILY MEDICINE

## 2020-11-17 PROCEDURE — 1101F PT FALLS ASSESS-DOCD LE1/YR: CPT | Mod: CPTII,S$GLB,, | Performed by: FAMILY MEDICINE

## 2020-11-17 PROCEDURE — 83036 HEMOGLOBIN GLYCOSYLATED A1C: CPT

## 2020-11-17 PROCEDURE — 3288F FALL RISK ASSESSMENT DOCD: CPT | Mod: CPTII,S$GLB,, | Performed by: FAMILY MEDICINE

## 2020-11-17 PROCEDURE — 99214 OFFICE O/P EST MOD 30 MIN: CPT | Mod: S$GLB,,, | Performed by: FAMILY MEDICINE

## 2020-11-17 PROCEDURE — 99499 RISK ADDL DX/OHS AUDIT: ICD-10-PCS | Mod: S$GLB,,, | Performed by: FAMILY MEDICINE

## 2020-11-17 PROCEDURE — 36415 COLL VENOUS BLD VENIPUNCTURE: CPT | Mod: PO

## 2020-11-17 PROCEDURE — 1101F PR PT FALLS ASSESS DOC 0-1 FALLS W/OUT INJ PAST YR: ICD-10-PCS | Mod: CPTII,S$GLB,, | Performed by: FAMILY MEDICINE

## 2020-11-17 PROCEDURE — 99999 PR PBB SHADOW E&M-EST. PATIENT-LVL V: CPT | Mod: PBBFAC,,, | Performed by: FAMILY MEDICINE

## 2020-11-17 PROCEDURE — 3288F PR FALLS RISK ASSESSMENT DOCUMENTED: ICD-10-PCS | Mod: CPTII,S$GLB,, | Performed by: FAMILY MEDICINE

## 2020-11-17 PROCEDURE — 1159F MED LIST DOCD IN RCRD: CPT | Mod: S$GLB,,, | Performed by: FAMILY MEDICINE

## 2020-11-17 PROCEDURE — 1159F PR MEDICATION LIST DOCUMENTED IN MEDICAL RECORD: ICD-10-PCS | Mod: S$GLB,,, | Performed by: FAMILY MEDICINE

## 2020-11-17 NOTE — PROGRESS NOTES
Edie Hammond    Chief Complaint   Patient presents with    Diabetes    Follow-up       History of Present Illness:   Ms. Hammond comes in today for 3-month diabetes follow-up.  She is not fasting but has taken medications today.  She states she monitors her diet little.  She states she does a little walking in her house.  She states she goes to the grocery store weekly.  She states she performs home glucose checks twice daily (fasting and after dinner); she reports fasting levels are <100 and after dinner levels are < 160.  She states her glucose was 80 this morning.    She states she feels good today and reports no acute problems.  She denies having fever, chills, fatigue, appetite changes; shortness of breath, cough, wheezing; chest pain, palpitations, leg swelling; abdominal pain, nausea, vomiting, diarrhea, constipation; unusual urinary symptoms; polydipsia, polyphagia, polyuria, hot or cold intolerance; back pain; headache; anxiety, depression, homicidal or suicidal thoughts.     She states she follows with Dr. Reeves, ophthalmologist, for surveillance of glaucoma but states full see Optometry for refractive exam today.    She follows with Dr. Milton, cardiologist, and states she will see him next month with stress test.         Labs:                       WBC                      8.48                08/17/2020                 HGB                      12.2                08/17/2020                 HCT                      39.2                08/17/2020                 PLT                      313                 08/17/2020                 CHOL                     205 (H)             01/07/2020                 TRIG                     73                  01/07/2020                 HDL                      83 (H)              01/07/2020                 ALT                      10                  08/17/2020                 AST                      20                  08/17/2020                 NA                        139                 08/17/2020                 K                        4.2                 08/17/2020                 CL                       103                 08/17/2020                 CREATININE               0.7                 08/17/2020                 BUN                      19                  08/17/2020                 CO2                      27                  08/17/2020                 TSH                      2.175               08/17/2020                 HGBA1C                   5.7 (H)             08/17/2020             LDLCALC                  107.4               01/07/2020                Current Outpatient Medications   Medication Sig    amLODIPine (NORVASC) 10 MG tablet Take 5 mg by mouth once daily.     azithromycin (Z-CHAMP) 250 MG tablet Take 2 tabs today then one tab daily for 4 days    blood sugar diagnostic (BLOOD GLUCOSE TEST) Strp Use twice daily prn. Dx: E11.9    blood-glucose meter kit Use as instructed - dx: E11.9    bumetanide (BUMEX) 1 MG tablet But also states some times takes 3 times per week    cetirizine (ZYRTEC) 10 MG tablet Take 10 mg by mouth once daily.     cloNIDine (CATAPRES) 0.1 MG tablet Take 0.1 mg by mouth daily as needed (takes only if SBP > 180 per cardiologist).     dorzolamide-timolol 2-0.5% (COSOPT) 22.3-6.8 mg/mL ophthalmic solution 1 drop.    fluticasone propionate (FLONASE) 50 mcg/actuation nasal spray 2 sprays by Each Nostril route daily as needed for Rhinitis.    hydrALAZINE (APRESOLINE) 100 MG tablet TAKE ONE TABLET BY MOUTH THREE TIMES DAILY     hydrocodone-homatropine 5-1.5 mg/5 ml (HYCODAN) 5-1.5 mg/5 mL Syrp Take 5 mLs by mouth 3 (three) times daily as needed.    JANUVIA 100 mg Tab TAKE ONE TABLET BY MOUTH ONE TIME DAILY     lancets Misc Use twice daily prn. Dx: E11.9    levothyroxine (SYNTHROID) 50 MCG tablet TAKE ONE TABLET BY MOUTH BEFORE BREAKFAST     losartan (COZAAR) 100 MG tablet Take 100 mg by mouth once daily.     meclizine (ANTIVERT) 12.5 mg tablet Take 1/2 to 1 pill twice daily prn vertigo    multivitamin with minerals tablet Take 1 tablet by mouth once daily.     nystatin (MYCOSTATIN) cream Apply topically 2 (two) times daily as needed (irritated skin).    pantoprazole (PROTONIX) 40 MG tablet TAKE 1 TABLET(40 MG) BY MOUTH EVERY DAY       Review of Systems   Constitutional: Negative for activity change, appetite change, chills, fatigue and fever.        Weight   62.3 kg (137 lb 5.6 oz) at August 17, 2020 visit.   Eyes:        See history of present illness.   Respiratory: Negative for cough, shortness of breath and wheezing.    Cardiovascular: Negative for chest pain, palpitations and leg swelling.        See history of present illness.   Gastrointestinal: Negative for abdominal pain, constipation, diarrhea, nausea and vomiting.   Endocrine: Negative for cold intolerance, heat intolerance, polydipsia, polyphagia and polyuria.        See history of present illness.   Genitourinary: Negative for difficulty urinating.   Musculoskeletal: Negative for back pain.   Neurological: Negative for headaches.   Psychiatric/Behavioral: Negative for confusion, dysphoric mood, sleep disturbance and suicidal ideas. The patient is not nervous/anxious.         Negative for homicidal ideas.       Objective:  Physical Exam  Vitals signs reviewed.   Constitutional:       General: She is not in acute distress.     Appearance: Normal appearance. She is well-developed. She is not ill-appearing, toxic-appearing or diaphoretic.      Comments: Pleasant.   Neck:      Musculoskeletal: Normal range of motion and neck supple.      Thyroid: No thyromegaly.   Cardiovascular:      Rate and Rhythm: Normal rate and regular rhythm.      Pulses:           Dorsalis pedis pulses are 3+ on the right side and 3+ on the left side.        Posterior tibial pulses are 3+ on the right side and 3+ on the left side.      Heart sounds: Murmur present.      Comments:  Chronic per patient (murmur noted).  Pulmonary:      Effort: Pulmonary effort is normal. No respiratory distress.      Breath sounds: Normal breath sounds. No stridor. No wheezing.   Abdominal:      General: Bowel sounds are normal. There is no distension.      Palpations: Abdomen is soft. There is no mass.      Tenderness: There is no abdominal tenderness. There is no guarding.   Musculoskeletal: Normal range of motion.         General: No swelling or tenderness.      Comments: Hypertrophied toenails noted. She is ambulatory with assistance of cane.   Feet:      Right foot:      Protective Sensation: 5 sites tested. 5 sites sensed.      Skin integrity: No ulcer or skin breakdown.      Left foot:      Protective Sensation: 5 sites tested. 5 sites sensed.      Skin integrity: No ulcer or skin breakdown.   Lymphadenopathy:      Cervical: No cervical adenopathy.   Neurological:      General: No focal deficit present.      Mental Status: She is alert and oriented to person, place, and time.   Psychiatric:         Mood and Affect: Mood normal.         Behavior: Behavior normal.         Thought Content: Thought content normal.         Judgment: Judgment normal.         ASSESSMENT:  1. Type 2 diabetes mellitus without complication, without long-term current use of insulin    2. Hypertension associated with diabetes    3. Hypothyroidism, unspecified type    4. Glaucoma, unspecified glaucoma type, unspecified laterality    5. Overweight (BMI 25.0-29.9)        PLAN:  Edie Almanzar was seen today for diabetes and follow-up.    Diagnoses and all orders for this visit:    Type 2 diabetes mellitus without complication, without long-term current use of insulin  -     Hemoglobin A1C; Future    Hypertension associated with diabetes    Hypothyroidism, unspecified type    Glaucoma, unspecified glaucoma type, unspecified laterality    Overweight (BMI 25.0-29.9)       Patient advised to call for results.  Continue current medications,  follow low sodium, low cholesterol, low carb diet, daily walks.  Keep follow up with specialists.  Follow up in about 6 months (around 5/17/2021) for diabetes, hypertension and hypothyroidism follow up.

## 2020-11-18 LAB
ESTIMATED AVG GLUCOSE: 114 MG/DL (ref 68–131)
HBA1C MFR BLD HPLC: 5.6 % (ref 4–5.6)

## 2020-12-02 ENCOUNTER — PATIENT OUTREACH (OUTPATIENT)
Dept: ADMINISTRATIVE | Facility: HOSPITAL | Age: 85
End: 2020-12-02

## 2020-12-29 DIAGNOSIS — E11.9 TYPE 2 DIABETES MELLITUS WITHOUT COMPLICATION, WITHOUT LONG-TERM CURRENT USE OF INSULIN: ICD-10-CM

## 2020-12-29 RX ORDER — SITAGLIPTIN 100 MG/1
TABLET, FILM COATED ORAL
Qty: 90 TABLET | Refills: 1 | Status: SHIPPED | OUTPATIENT
Start: 2020-12-29 | End: 2021-06-29

## 2021-01-05 ENCOUNTER — IMMUNIZATION (OUTPATIENT)
Dept: INTERNAL MEDICINE | Facility: CLINIC | Age: 86
End: 2021-01-05
Payer: MEDICARE

## 2021-01-05 DIAGNOSIS — Z23 NEED FOR VACCINATION: ICD-10-CM

## 2021-01-05 PROCEDURE — 91300 COVID-19, MRNA, LNP-S, PF, 30 MCG/0.3 ML DOSE VACCINE: CPT | Mod: PBBFAC | Performed by: FAMILY MEDICINE

## 2021-01-18 DIAGNOSIS — E03.9 HYPOTHYROIDISM, UNSPECIFIED TYPE: ICD-10-CM

## 2021-01-19 DIAGNOSIS — E11.59 HYPERTENSION ASSOCIATED WITH DIABETES: ICD-10-CM

## 2021-01-19 DIAGNOSIS — I15.2 HYPERTENSION ASSOCIATED WITH DIABETES: ICD-10-CM

## 2021-01-19 RX ORDER — LEVOTHYROXINE SODIUM 50 UG/1
TABLET ORAL
Qty: 90 TABLET | Refills: 1 | Status: SHIPPED | OUTPATIENT
Start: 2021-01-19 | End: 2021-04-30

## 2021-01-19 RX ORDER — HYDRALAZINE HYDROCHLORIDE 100 MG/1
TABLET, FILM COATED ORAL
Qty: 270 TABLET | Refills: 1 | Status: SHIPPED | OUTPATIENT
Start: 2021-01-19 | End: 2021-08-02

## 2021-01-26 ENCOUNTER — IMMUNIZATION (OUTPATIENT)
Dept: INTERNAL MEDICINE | Facility: CLINIC | Age: 86
End: 2021-01-26
Payer: MEDICARE

## 2021-01-26 DIAGNOSIS — Z23 NEED FOR VACCINATION: Primary | ICD-10-CM

## 2021-01-26 PROCEDURE — 91300 COVID-19, MRNA, LNP-S, PF, 30 MCG/0.3 ML DOSE VACCINE: CPT | Mod: PBBFAC | Performed by: FAMILY MEDICINE

## 2021-01-26 PROCEDURE — 0002A COVID-19, MRNA, LNP-S, PF, 30 MCG/0.3 ML DOSE VACCINE: CPT | Mod: PBBFAC | Performed by: FAMILY MEDICINE

## 2021-02-18 RX ORDER — INSULIN PUMP SYRINGE, 3 ML
EACH MISCELLANEOUS
Qty: 1 EACH | Refills: 0 | Status: SHIPPED | OUTPATIENT
Start: 2021-02-18 | End: 2024-03-11

## 2021-02-18 RX ORDER — IBUPROFEN 200 MG
CAPSULE ORAL
Qty: 300 EACH | Refills: 3 | Status: SHIPPED | OUTPATIENT
Start: 2021-02-18

## 2021-02-24 ENCOUNTER — TELEPHONE (OUTPATIENT)
Dept: FAMILY MEDICINE | Facility: CLINIC | Age: 86
End: 2021-02-24

## 2021-05-04 ENCOUNTER — OFFICE VISIT (OUTPATIENT)
Dept: FAMILY MEDICINE | Facility: CLINIC | Age: 86
End: 2021-05-04
Payer: MEDICARE

## 2021-05-04 VITALS
HEIGHT: 59 IN | RESPIRATION RATE: 18 BRPM | BODY MASS INDEX: 29.29 KG/M2 | WEIGHT: 145.31 LBS | HEART RATE: 74 BPM | OXYGEN SATURATION: 95 % | DIASTOLIC BLOOD PRESSURE: 54 MMHG | SYSTOLIC BLOOD PRESSURE: 130 MMHG | TEMPERATURE: 97 F

## 2021-05-04 DIAGNOSIS — M25.511 CHRONIC PAIN OF BOTH SHOULDERS: Primary | ICD-10-CM

## 2021-05-04 DIAGNOSIS — G89.29 CHRONIC PAIN OF BOTH SHOULDERS: Primary | ICD-10-CM

## 2021-05-04 DIAGNOSIS — M25.512 CHRONIC PAIN OF BOTH SHOULDERS: Primary | ICD-10-CM

## 2021-05-04 PROBLEM — S06.5XAA SUBDURAL HEMATOMA: Status: ACTIVE | Noted: 2017-04-08

## 2021-05-04 PROBLEM — R56.9 SEIZURE: Status: ACTIVE | Noted: 2017-04-16

## 2021-05-04 PROBLEM — I35.0 AORTIC STENOSIS: Status: ACTIVE | Noted: 2017-04-08

## 2021-05-04 PROBLEM — R73.03 PREDIABETES: Status: ACTIVE | Noted: 2020-01-06

## 2021-05-04 PROCEDURE — 1101F PT FALLS ASSESS-DOCD LE1/YR: CPT | Mod: CPTII,S$GLB,, | Performed by: REGISTERED NURSE

## 2021-05-04 PROCEDURE — 99999 PR PBB SHADOW E&M-EST. PATIENT-LVL V: ICD-10-PCS | Mod: PBBFAC,,, | Performed by: REGISTERED NURSE

## 2021-05-04 PROCEDURE — 99213 PR OFFICE/OUTPT VISIT, EST, LEVL III, 20-29 MIN: ICD-10-PCS | Mod: 25,S$GLB,, | Performed by: REGISTERED NURSE

## 2021-05-04 PROCEDURE — 99999 PR PBB SHADOW E&M-EST. PATIENT-LVL V: CPT | Mod: PBBFAC,,, | Performed by: REGISTERED NURSE

## 2021-05-04 PROCEDURE — 1125F PR PAIN SEVERITY QUANTIFIED, PAIN PRESENT: ICD-10-PCS | Mod: S$GLB,,, | Performed by: REGISTERED NURSE

## 2021-05-04 PROCEDURE — 99213 OFFICE O/P EST LOW 20 MIN: CPT | Mod: 25,S$GLB,, | Performed by: REGISTERED NURSE

## 2021-05-04 PROCEDURE — 96372 PR INJECTION,THERAP/PROPH/DIAG2ST, IM OR SUBCUT: ICD-10-PCS | Mod: S$GLB,,, | Performed by: REGISTERED NURSE

## 2021-05-04 PROCEDURE — 96372 THER/PROPH/DIAG INJ SC/IM: CPT | Mod: S$GLB,,, | Performed by: REGISTERED NURSE

## 2021-05-04 PROCEDURE — 1101F PR PT FALLS ASSESS DOC 0-1 FALLS W/OUT INJ PAST YR: ICD-10-PCS | Mod: CPTII,S$GLB,, | Performed by: REGISTERED NURSE

## 2021-05-04 PROCEDURE — 1159F MED LIST DOCD IN RCRD: CPT | Mod: S$GLB,,, | Performed by: REGISTERED NURSE

## 2021-05-04 PROCEDURE — 3288F PR FALLS RISK ASSESSMENT DOCUMENTED: ICD-10-PCS | Mod: CPTII,S$GLB,, | Performed by: REGISTERED NURSE

## 2021-05-04 PROCEDURE — 1159F PR MEDICATION LIST DOCUMENTED IN MEDICAL RECORD: ICD-10-PCS | Mod: S$GLB,,, | Performed by: REGISTERED NURSE

## 2021-05-04 PROCEDURE — 3288F FALL RISK ASSESSMENT DOCD: CPT | Mod: CPTII,S$GLB,, | Performed by: REGISTERED NURSE

## 2021-05-04 PROCEDURE — 1125F AMNT PAIN NOTED PAIN PRSNT: CPT | Mod: S$GLB,,, | Performed by: REGISTERED NURSE

## 2021-05-04 RX ORDER — DEXAMETHASONE SODIUM PHOSPHATE 4 MG/ML
4 INJECTION, SOLUTION INTRA-ARTICULAR; INTRALESIONAL; INTRAMUSCULAR; INTRAVENOUS; SOFT TISSUE
Status: COMPLETED | OUTPATIENT
Start: 2021-05-04 | End: 2021-05-04

## 2021-05-04 RX ADMIN — DEXAMETHASONE SODIUM PHOSPHATE 4 MG: 4 INJECTION, SOLUTION INTRA-ARTICULAR; INTRALESIONAL; INTRAMUSCULAR; INTRAVENOUS; SOFT TISSUE at 03:05

## 2021-05-06 ENCOUNTER — OFFICE VISIT (OUTPATIENT)
Dept: INTERNAL MEDICINE | Facility: CLINIC | Age: 86
End: 2021-05-06
Payer: MEDICARE

## 2021-05-06 ENCOUNTER — OFFICE VISIT (OUTPATIENT)
Dept: OTOLARYNGOLOGY | Facility: CLINIC | Age: 86
End: 2021-05-06
Payer: MEDICARE

## 2021-05-06 VITALS
BODY MASS INDEX: 29.16 KG/M2 | WEIGHT: 144.63 LBS | HEIGHT: 59 IN | TEMPERATURE: 98 F | OXYGEN SATURATION: 97 % | HEART RATE: 69 BPM | SYSTOLIC BLOOD PRESSURE: 132 MMHG | DIASTOLIC BLOOD PRESSURE: 70 MMHG

## 2021-05-06 VITALS
BODY MASS INDEX: 29.08 KG/M2 | DIASTOLIC BLOOD PRESSURE: 70 MMHG | OXYGEN SATURATION: 97 % | SYSTOLIC BLOOD PRESSURE: 132 MMHG | TEMPERATURE: 98 F | WEIGHT: 144 LBS

## 2021-05-06 DIAGNOSIS — E11.59 HYPERTENSION ASSOCIATED WITH DIABETES: ICD-10-CM

## 2021-05-06 DIAGNOSIS — R22.0 TONGUE SWELLING: Primary | ICD-10-CM

## 2021-05-06 DIAGNOSIS — I15.2 HYPERTENSION ASSOCIATED WITH DIABETES: ICD-10-CM

## 2021-05-06 DIAGNOSIS — T78.3XXA ANGIOEDEMA, INITIAL ENCOUNTER: ICD-10-CM

## 2021-05-06 DIAGNOSIS — R22.1 NECK SWELLING: ICD-10-CM

## 2021-05-06 DIAGNOSIS — T78.3XXA ANGIOEDEMA, INITIAL ENCOUNTER: Primary | ICD-10-CM

## 2021-05-06 PROCEDURE — 99499 RISK ADDL DX/OHS AUDIT: ICD-10-PCS | Mod: S$GLB,,, | Performed by: PHYSICIAN ASSISTANT

## 2021-05-06 PROCEDURE — 1159F MED LIST DOCD IN RCRD: CPT | Mod: S$GLB,,, | Performed by: PHYSICIAN ASSISTANT

## 2021-05-06 PROCEDURE — 99999 PR PBB SHADOW E&M-EST. PATIENT-LVL V: ICD-10-PCS | Mod: PBBFAC,,, | Performed by: PHYSICIAN ASSISTANT

## 2021-05-06 PROCEDURE — 31575 DIAGNOSTIC LARYNGOSCOPY: CPT | Mod: S$GLB,,, | Performed by: OTOLARYNGOLOGY

## 2021-05-06 PROCEDURE — 99203 PR OFFICE/OUTPT VISIT, NEW, LEVL III, 30-44 MIN: ICD-10-PCS | Mod: 25,S$GLB,, | Performed by: OTOLARYNGOLOGY

## 2021-05-06 PROCEDURE — 1101F PR PT FALLS ASSESS DOC 0-1 FALLS W/OUT INJ PAST YR: ICD-10-PCS | Mod: CPTII,S$GLB,, | Performed by: PHYSICIAN ASSISTANT

## 2021-05-06 PROCEDURE — 1159F MED LIST DOCD IN RCRD: CPT | Mod: S$GLB,,, | Performed by: OTOLARYNGOLOGY

## 2021-05-06 PROCEDURE — 1159F PR MEDICATION LIST DOCUMENTED IN MEDICAL RECORD: ICD-10-PCS | Mod: S$GLB,,, | Performed by: OTOLARYNGOLOGY

## 2021-05-06 PROCEDURE — 1126F AMNT PAIN NOTED NONE PRSNT: CPT | Mod: S$GLB,,, | Performed by: PHYSICIAN ASSISTANT

## 2021-05-06 PROCEDURE — 1101F PT FALLS ASSESS-DOCD LE1/YR: CPT | Mod: CPTII,S$GLB,, | Performed by: PHYSICIAN ASSISTANT

## 2021-05-06 PROCEDURE — 99999 PR PBB SHADOW E&M-EST. PATIENT-LVL III: ICD-10-PCS | Mod: PBBFAC,,, | Performed by: OTOLARYNGOLOGY

## 2021-05-06 PROCEDURE — 99214 OFFICE O/P EST MOD 30 MIN: CPT | Mod: S$GLB,,, | Performed by: PHYSICIAN ASSISTANT

## 2021-05-06 PROCEDURE — 3288F FALL RISK ASSESSMENT DOCD: CPT | Mod: CPTII,S$GLB,, | Performed by: PHYSICIAN ASSISTANT

## 2021-05-06 PROCEDURE — 99999 PR PBB SHADOW E&M-EST. PATIENT-LVL V: CPT | Mod: PBBFAC,,, | Performed by: PHYSICIAN ASSISTANT

## 2021-05-06 PROCEDURE — 99203 OFFICE O/P NEW LOW 30 MIN: CPT | Mod: 25,S$GLB,, | Performed by: OTOLARYNGOLOGY

## 2021-05-06 PROCEDURE — 1126F PR PAIN SEVERITY QUANTIFIED, NO PAIN PRESENT: ICD-10-PCS | Mod: S$GLB,,, | Performed by: PHYSICIAN ASSISTANT

## 2021-05-06 PROCEDURE — 99214 PR OFFICE/OUTPT VISIT, EST, LEVL IV, 30-39 MIN: ICD-10-PCS | Mod: S$GLB,,, | Performed by: PHYSICIAN ASSISTANT

## 2021-05-06 PROCEDURE — 31575 PR LARYNGOSCOPY, FLEXIBLE; DIAGNOSTIC: ICD-10-PCS | Mod: S$GLB,,, | Performed by: OTOLARYNGOLOGY

## 2021-05-06 PROCEDURE — 3288F PR FALLS RISK ASSESSMENT DOCUMENTED: ICD-10-PCS | Mod: CPTII,S$GLB,, | Performed by: PHYSICIAN ASSISTANT

## 2021-05-06 PROCEDURE — 99999 PR PBB SHADOW E&M-EST. PATIENT-LVL III: CPT | Mod: PBBFAC,,, | Performed by: OTOLARYNGOLOGY

## 2021-05-06 PROCEDURE — 99499 UNLISTED E&M SERVICE: CPT | Mod: S$GLB,,, | Performed by: PHYSICIAN ASSISTANT

## 2021-05-06 PROCEDURE — 1159F PR MEDICATION LIST DOCUMENTED IN MEDICAL RECORD: ICD-10-PCS | Mod: S$GLB,,, | Performed by: PHYSICIAN ASSISTANT

## 2021-05-06 RX ORDER — PREDNISONE 20 MG/1
20 TABLET ORAL 2 TIMES DAILY
Qty: 10 TABLET | Refills: 0 | Status: SHIPPED | OUTPATIENT
Start: 2021-05-06 | End: 2021-05-11

## 2021-05-06 RX ORDER — PREDNISONE 20 MG/1
20 TABLET ORAL 2 TIMES DAILY
Qty: 10 TABLET | Refills: 0 | Status: SHIPPED | OUTPATIENT
Start: 2021-05-06 | End: 2021-05-06 | Stop reason: SDUPTHER

## 2021-05-20 ENCOUNTER — TELEPHONE (OUTPATIENT)
Dept: INTERNAL MEDICINE | Facility: CLINIC | Age: 86
End: 2021-05-20

## 2021-05-24 ENCOUNTER — TELEPHONE (OUTPATIENT)
Dept: INTERNAL MEDICINE | Facility: CLINIC | Age: 86
End: 2021-05-24

## 2021-05-25 ENCOUNTER — PATIENT MESSAGE (OUTPATIENT)
Dept: INTERNAL MEDICINE | Facility: CLINIC | Age: 86
End: 2021-05-25

## 2021-05-28 ENCOUNTER — LAB VISIT (OUTPATIENT)
Dept: LAB | Facility: HOSPITAL | Age: 86
End: 2021-05-28
Attending: FAMILY MEDICINE
Payer: MEDICARE

## 2021-05-28 ENCOUNTER — OFFICE VISIT (OUTPATIENT)
Dept: INTERNAL MEDICINE | Facility: CLINIC | Age: 86
End: 2021-05-28
Payer: MEDICARE

## 2021-05-28 VITALS
RESPIRATION RATE: 18 BRPM | DIASTOLIC BLOOD PRESSURE: 72 MMHG | SYSTOLIC BLOOD PRESSURE: 116 MMHG | WEIGHT: 145.94 LBS | HEIGHT: 59 IN | OXYGEN SATURATION: 96 % | TEMPERATURE: 97 F | BODY MASS INDEX: 29.42 KG/M2 | HEART RATE: 66 BPM

## 2021-05-28 DIAGNOSIS — E03.9 HYPOTHYROIDISM, UNSPECIFIED TYPE: ICD-10-CM

## 2021-05-28 DIAGNOSIS — E11.9 TYPE 2 DIABETES MELLITUS WITHOUT COMPLICATION, WITHOUT LONG-TERM CURRENT USE OF INSULIN: ICD-10-CM

## 2021-05-28 DIAGNOSIS — E11.59 HYPERTENSION ASSOCIATED WITH DIABETES: ICD-10-CM

## 2021-05-28 DIAGNOSIS — I15.2 HYPERTENSION ASSOCIATED WITH DIABETES: ICD-10-CM

## 2021-05-28 DIAGNOSIS — I15.2 HYPERTENSION ASSOCIATED WITH DIABETES: Primary | ICD-10-CM

## 2021-05-28 DIAGNOSIS — E66.3 OVERWEIGHT (BMI 25.0-29.9): ICD-10-CM

## 2021-05-28 DIAGNOSIS — H40.9 GLAUCOMA, UNSPECIFIED GLAUCOMA TYPE, UNSPECIFIED LATERALITY: ICD-10-CM

## 2021-05-28 DIAGNOSIS — E11.59 HYPERTENSION ASSOCIATED WITH DIABETES: Primary | ICD-10-CM

## 2021-05-28 LAB
ALBUMIN SERPL BCP-MCNC: 3.8 G/DL (ref 3.5–5.2)
ALP SERPL-CCNC: 64 U/L (ref 55–135)
ALT SERPL W/O P-5'-P-CCNC: 9 U/L (ref 10–44)
ANION GAP SERPL CALC-SCNC: 9 MMOL/L (ref 8–16)
AST SERPL-CCNC: 17 U/L (ref 10–40)
BILIRUB SERPL-MCNC: 0.3 MG/DL (ref 0.1–1)
BUN SERPL-MCNC: 15 MG/DL (ref 10–30)
CALCIUM SERPL-MCNC: 10.2 MG/DL (ref 8.7–10.5)
CHLORIDE SERPL-SCNC: 104 MMOL/L (ref 95–110)
CO2 SERPL-SCNC: 28 MMOL/L (ref 23–29)
CREAT SERPL-MCNC: 0.8 MG/DL (ref 0.5–1.4)
EST. GFR  (AFRICAN AMERICAN): >60 ML/MIN/1.73 M^2
EST. GFR  (NON AFRICAN AMERICAN): >60 ML/MIN/1.73 M^2
GLUCOSE SERPL-MCNC: 98 MG/DL (ref 70–110)
POTASSIUM SERPL-SCNC: 3.9 MMOL/L (ref 3.5–5.1)
PROT SERPL-MCNC: 7.3 G/DL (ref 6–8.4)
SODIUM SERPL-SCNC: 141 MMOL/L (ref 136–145)

## 2021-05-28 PROCEDURE — 3288F FALL RISK ASSESSMENT DOCD: CPT | Mod: CPTII,S$GLB,, | Performed by: FAMILY MEDICINE

## 2021-05-28 PROCEDURE — 1159F MED LIST DOCD IN RCRD: CPT | Mod: S$GLB,,, | Performed by: FAMILY MEDICINE

## 2021-05-28 PROCEDURE — 80053 COMPREHEN METABOLIC PANEL: CPT | Performed by: FAMILY MEDICINE

## 2021-05-28 PROCEDURE — 3288F PR FALLS RISK ASSESSMENT DOCUMENTED: ICD-10-PCS | Mod: CPTII,S$GLB,, | Performed by: FAMILY MEDICINE

## 2021-05-28 PROCEDURE — 99999 PR PBB SHADOW E&M-EST. PATIENT-LVL V: ICD-10-PCS | Mod: PBBFAC,,, | Performed by: FAMILY MEDICINE

## 2021-05-28 PROCEDURE — 99999 PR PBB SHADOW E&M-EST. PATIENT-LVL V: CPT | Mod: PBBFAC,,, | Performed by: FAMILY MEDICINE

## 2021-05-28 PROCEDURE — 1159F PR MEDICATION LIST DOCUMENTED IN MEDICAL RECORD: ICD-10-PCS | Mod: S$GLB,,, | Performed by: FAMILY MEDICINE

## 2021-05-28 PROCEDURE — 1101F PR PT FALLS ASSESS DOC 0-1 FALLS W/OUT INJ PAST YR: ICD-10-PCS | Mod: CPTII,S$GLB,, | Performed by: FAMILY MEDICINE

## 2021-05-28 PROCEDURE — 1101F PT FALLS ASSESS-DOCD LE1/YR: CPT | Mod: CPTII,S$GLB,, | Performed by: FAMILY MEDICINE

## 2021-05-28 PROCEDURE — 83036 HEMOGLOBIN GLYCOSYLATED A1C: CPT | Performed by: FAMILY MEDICINE

## 2021-05-28 PROCEDURE — 99214 PR OFFICE/OUTPT VISIT, EST, LEVL IV, 30-39 MIN: ICD-10-PCS | Mod: S$GLB,,, | Performed by: FAMILY MEDICINE

## 2021-05-28 PROCEDURE — 36415 COLL VENOUS BLD VENIPUNCTURE: CPT | Performed by: FAMILY MEDICINE

## 2021-05-28 PROCEDURE — 1126F AMNT PAIN NOTED NONE PRSNT: CPT | Mod: S$GLB,,, | Performed by: FAMILY MEDICINE

## 2021-05-28 PROCEDURE — 99214 OFFICE O/P EST MOD 30 MIN: CPT | Mod: S$GLB,,, | Performed by: FAMILY MEDICINE

## 2021-05-28 PROCEDURE — 1126F PR PAIN SEVERITY QUANTIFIED, NO PAIN PRESENT: ICD-10-PCS | Mod: S$GLB,,, | Performed by: FAMILY MEDICINE

## 2021-05-28 RX ORDER — DOXAZOSIN 2 MG/1
2 TABLET ORAL NIGHTLY
COMMUNITY
Start: 2021-05-06 | End: 2021-06-07

## 2021-05-29 LAB
ESTIMATED AVG GLUCOSE: 123 MG/DL (ref 68–131)
HBA1C MFR BLD: 5.9 % (ref 4–5.6)

## 2021-06-07 ENCOUNTER — OFFICE VISIT (OUTPATIENT)
Dept: ALLERGY | Facility: CLINIC | Age: 86
End: 2021-06-07
Payer: MEDICARE

## 2021-06-07 VITALS
WEIGHT: 147.5 LBS | BODY MASS INDEX: 29.73 KG/M2 | SYSTOLIC BLOOD PRESSURE: 127 MMHG | HEIGHT: 59 IN | TEMPERATURE: 98 F | HEART RATE: 71 BPM | DIASTOLIC BLOOD PRESSURE: 65 MMHG

## 2021-06-07 DIAGNOSIS — T78.3XXA ANGIOEDEMA, INITIAL ENCOUNTER: ICD-10-CM

## 2021-06-07 DIAGNOSIS — R22.1 NECK SWELLING: ICD-10-CM

## 2021-06-07 DIAGNOSIS — R22.0 TONGUE SWELLING: ICD-10-CM

## 2021-06-07 PROCEDURE — 99999 PR PBB SHADOW E&M-EST. PATIENT-LVL V: ICD-10-PCS | Mod: PBBFAC,,, | Performed by: ALLERGY & IMMUNOLOGY

## 2021-06-07 PROCEDURE — 99204 PR OFFICE/OUTPT VISIT, NEW, LEVL IV, 45-59 MIN: ICD-10-PCS | Mod: S$GLB,,, | Performed by: ALLERGY & IMMUNOLOGY

## 2021-06-07 PROCEDURE — 1159F MED LIST DOCD IN RCRD: CPT | Mod: S$GLB,,, | Performed by: ALLERGY & IMMUNOLOGY

## 2021-06-07 PROCEDURE — 1159F PR MEDICATION LIST DOCUMENTED IN MEDICAL RECORD: ICD-10-PCS | Mod: S$GLB,,, | Performed by: ALLERGY & IMMUNOLOGY

## 2021-06-07 PROCEDURE — 99204 OFFICE O/P NEW MOD 45 MIN: CPT | Mod: S$GLB,,, | Performed by: ALLERGY & IMMUNOLOGY

## 2021-06-07 PROCEDURE — 99999 PR PBB SHADOW E&M-EST. PATIENT-LVL V: CPT | Mod: PBBFAC,,, | Performed by: ALLERGY & IMMUNOLOGY

## 2021-06-07 RX ORDER — LATANOPROST 50 UG/ML
SOLUTION/ DROPS OPHTHALMIC
COMMUNITY
Start: 2021-05-05 | End: 2023-05-11

## 2021-06-07 RX ORDER — MONTELUKAST SODIUM 10 MG/1
10 TABLET ORAL NIGHTLY
Qty: 30 TABLET | Refills: 3 | Status: SHIPPED | OUTPATIENT
Start: 2021-06-07 | End: 2021-10-06

## 2021-06-07 RX ORDER — AMLODIPINE BESYLATE 5 MG/1
5 TABLET ORAL DAILY
COMMUNITY
End: 2022-09-19 | Stop reason: DRUGHIGH

## 2021-06-07 RX ORDER — DOXAZOSIN 1 MG/1
1 TABLET ORAL NIGHTLY
COMMUNITY
Start: 2021-06-03 | End: 2023-01-17

## 2021-06-07 RX ORDER — BRIMONIDINE TARTRATE 2 MG/ML
1 SOLUTION/ DROPS OPHTHALMIC 2 TIMES DAILY
COMMUNITY
Start: 2021-01-04 | End: 2021-10-04

## 2021-06-07 RX ORDER — EPINEPHRINE 0.3 MG/.3ML
1 INJECTION SUBCUTANEOUS ONCE
Qty: 2 DEVICE | Refills: 3 | Status: SHIPPED | OUTPATIENT
Start: 2021-06-07 | End: 2022-06-21

## 2021-06-29 DIAGNOSIS — E11.9 TYPE 2 DIABETES MELLITUS WITHOUT COMPLICATION, WITHOUT LONG-TERM CURRENT USE OF INSULIN: ICD-10-CM

## 2021-06-29 RX ORDER — SITAGLIPTIN 100 MG/1
TABLET, FILM COATED ORAL
Qty: 90 TABLET | Refills: 1 | Status: SHIPPED | OUTPATIENT
Start: 2021-06-29 | End: 2021-12-22

## 2021-08-01 DIAGNOSIS — E11.59 HYPERTENSION ASSOCIATED WITH DIABETES: ICD-10-CM

## 2021-08-01 DIAGNOSIS — I15.2 HYPERTENSION ASSOCIATED WITH DIABETES: ICD-10-CM

## 2021-08-02 RX ORDER — HYDRALAZINE HYDROCHLORIDE 100 MG/1
TABLET, FILM COATED ORAL
Qty: 270 TABLET | Refills: 1 | Status: SHIPPED | OUTPATIENT
Start: 2021-08-02 | End: 2022-01-11

## 2021-10-04 ENCOUNTER — LAB VISIT (OUTPATIENT)
Dept: LAB | Facility: HOSPITAL | Age: 86
End: 2021-10-04
Attending: FAMILY MEDICINE
Payer: MEDICARE

## 2021-10-04 ENCOUNTER — OFFICE VISIT (OUTPATIENT)
Dept: FAMILY MEDICINE | Facility: CLINIC | Age: 86
End: 2021-10-04
Payer: MEDICARE

## 2021-10-04 VITALS
OXYGEN SATURATION: 97 % | SYSTOLIC BLOOD PRESSURE: 122 MMHG | HEIGHT: 59 IN | DIASTOLIC BLOOD PRESSURE: 74 MMHG | BODY MASS INDEX: 29.51 KG/M2 | WEIGHT: 146.38 LBS | TEMPERATURE: 97 F | HEART RATE: 87 BPM

## 2021-10-04 DIAGNOSIS — E03.9 HYPOTHYROIDISM, UNSPECIFIED TYPE: ICD-10-CM

## 2021-10-04 DIAGNOSIS — Z12.31 VISIT FOR SCREENING MAMMOGRAM: ICD-10-CM

## 2021-10-04 DIAGNOSIS — Z78.0 POSTMENOPAUSAL: ICD-10-CM

## 2021-10-04 DIAGNOSIS — Z23 NEED FOR INFLUENZA VACCINATION: ICD-10-CM

## 2021-10-04 DIAGNOSIS — E11.9 TYPE 2 DIABETES MELLITUS WITHOUT COMPLICATION, WITHOUT LONG-TERM CURRENT USE OF INSULIN: ICD-10-CM

## 2021-10-04 DIAGNOSIS — E11.59 HYPERTENSION ASSOCIATED WITH DIABETES: ICD-10-CM

## 2021-10-04 DIAGNOSIS — H40.9 GLAUCOMA, UNSPECIFIED GLAUCOMA TYPE, UNSPECIFIED LATERALITY: ICD-10-CM

## 2021-10-04 DIAGNOSIS — E66.3 OVERWEIGHT (BMI 25.0-29.9): ICD-10-CM

## 2021-10-04 DIAGNOSIS — I15.2 HYPERTENSION ASSOCIATED WITH DIABETES: ICD-10-CM

## 2021-10-04 DIAGNOSIS — Z00.00 ANNUAL PHYSICAL EXAM: Primary | ICD-10-CM

## 2021-10-04 LAB
BASOPHILS # BLD AUTO: 0.05 K/UL (ref 0–0.2)
BASOPHILS NFR BLD: 0.5 % (ref 0–1.9)
DIFFERENTIAL METHOD: ABNORMAL
EOSINOPHIL # BLD AUTO: 0.1 K/UL (ref 0–0.5)
EOSINOPHIL NFR BLD: 1.5 % (ref 0–8)
ERYTHROCYTE [DISTWIDTH] IN BLOOD BY AUTOMATED COUNT: 15.4 % (ref 11.5–14.5)
HCT VFR BLD AUTO: 38.1 % (ref 37–48.5)
HGB BLD-MCNC: 12.1 G/DL (ref 12–16)
IMM GRANULOCYTES # BLD AUTO: 0.03 K/UL (ref 0–0.04)
IMM GRANULOCYTES NFR BLD AUTO: 0.3 % (ref 0–0.5)
LYMPHOCYTES # BLD AUTO: 1.7 K/UL (ref 1–4.8)
LYMPHOCYTES NFR BLD: 18.3 % (ref 18–48)
MCH RBC QN AUTO: 29.4 PG (ref 27–31)
MCHC RBC AUTO-ENTMCNC: 31.8 G/DL (ref 32–36)
MCV RBC AUTO: 93 FL (ref 82–98)
MONOCYTES # BLD AUTO: 0.9 K/UL (ref 0.3–1)
MONOCYTES NFR BLD: 9.7 % (ref 4–15)
NEUTROPHILS # BLD AUTO: 6.4 K/UL (ref 1.8–7.7)
NEUTROPHILS NFR BLD: 69.7 % (ref 38–73)
NRBC BLD-RTO: 0 /100 WBC
PLATELET # BLD AUTO: 290 K/UL (ref 150–450)
PMV BLD AUTO: 10.4 FL (ref 9.2–12.9)
RBC # BLD AUTO: 4.12 M/UL (ref 4–5.4)
WBC # BLD AUTO: 9.25 K/UL (ref 3.9–12.7)

## 2021-10-04 PROCEDURE — 90694 VACC AIIV4 NO PRSRV 0.5ML IM: CPT | Mod: S$GLB,,, | Performed by: FAMILY MEDICINE

## 2021-10-04 PROCEDURE — 90694 FLU VACCINE - QUADRIVALENT - ADJUVANTED: ICD-10-PCS | Mod: S$GLB,,, | Performed by: FAMILY MEDICINE

## 2021-10-04 PROCEDURE — 84443 ASSAY THYROID STIM HORMONE: CPT | Performed by: FAMILY MEDICINE

## 2021-10-04 PROCEDURE — 99213 OFFICE O/P EST LOW 20 MIN: CPT | Mod: 25,S$GLB,, | Performed by: FAMILY MEDICINE

## 2021-10-04 PROCEDURE — 83036 HEMOGLOBIN GLYCOSYLATED A1C: CPT | Performed by: FAMILY MEDICINE

## 2021-10-04 PROCEDURE — 80061 LIPID PANEL: CPT | Performed by: FAMILY MEDICINE

## 2021-10-04 PROCEDURE — 99213 PR OFFICE/OUTPT VISIT, EST, LEVL III, 20-29 MIN: ICD-10-PCS | Mod: 25,S$GLB,, | Performed by: FAMILY MEDICINE

## 2021-10-04 PROCEDURE — 99999 PR PBB SHADOW E&M-EST. PATIENT-LVL V: ICD-10-PCS | Mod: PBBFAC,,, | Performed by: FAMILY MEDICINE

## 2021-10-04 PROCEDURE — 99999 PR PBB SHADOW E&M-EST. PATIENT-LVL V: CPT | Mod: PBBFAC,,, | Performed by: FAMILY MEDICINE

## 2021-10-04 PROCEDURE — 80053 COMPREHEN METABOLIC PANEL: CPT | Performed by: FAMILY MEDICINE

## 2021-10-04 PROCEDURE — 36415 COLL VENOUS BLD VENIPUNCTURE: CPT | Mod: PO | Performed by: FAMILY MEDICINE

## 2021-10-04 PROCEDURE — 84156 ASSAY OF PROTEIN URINE: CPT | Performed by: FAMILY MEDICINE

## 2021-10-04 PROCEDURE — G0008 FLU VACCINE - QUADRIVALENT - ADJUVANTED: ICD-10-PCS | Mod: S$GLB,,, | Performed by: FAMILY MEDICINE

## 2021-10-04 PROCEDURE — G0008 ADMIN INFLUENZA VIRUS VAC: HCPCS | Mod: S$GLB,,, | Performed by: FAMILY MEDICINE

## 2021-10-04 PROCEDURE — 85025 COMPLETE CBC W/AUTO DIFF WBC: CPT | Performed by: FAMILY MEDICINE

## 2021-10-05 LAB
ALBUMIN SERPL BCP-MCNC: 3.8 G/DL (ref 3.5–5.2)
ALP SERPL-CCNC: 65 U/L (ref 55–135)
ALT SERPL W/O P-5'-P-CCNC: 9 U/L (ref 10–44)
ANION GAP SERPL CALC-SCNC: 11 MMOL/L (ref 8–16)
AST SERPL-CCNC: 19 U/L (ref 10–40)
BILIRUB SERPL-MCNC: 0.3 MG/DL (ref 0.1–1)
BUN SERPL-MCNC: 16 MG/DL (ref 10–30)
CALCIUM SERPL-MCNC: 10.1 MG/DL (ref 8.7–10.5)
CHLORIDE SERPL-SCNC: 101 MMOL/L (ref 95–110)
CHOLEST SERPL-MCNC: 180 MG/DL (ref 120–199)
CHOLEST/HDLC SERPL: 2.7 {RATIO} (ref 2–5)
CO2 SERPL-SCNC: 24 MMOL/L (ref 23–29)
CREAT SERPL-MCNC: 0.7 MG/DL (ref 0.5–1.4)
CREAT UR-MCNC: 97 MG/DL (ref 15–325)
EST. GFR  (AFRICAN AMERICAN): >60 ML/MIN/1.73 M^2
EST. GFR  (NON AFRICAN AMERICAN): >60 ML/MIN/1.73 M^2
ESTIMATED AVG GLUCOSE: 128 MG/DL (ref 68–131)
GLUCOSE SERPL-MCNC: 76 MG/DL (ref 70–110)
HBA1C MFR BLD: 6.1 % (ref 4–5.6)
HDLC SERPL-MCNC: 66 MG/DL (ref 40–75)
HDLC SERPL: 36.7 % (ref 20–50)
LDLC SERPL CALC-MCNC: 92.4 MG/DL (ref 63–159)
NONHDLC SERPL-MCNC: 114 MG/DL
POTASSIUM SERPL-SCNC: 4.2 MMOL/L (ref 3.5–5.1)
PROT SERPL-MCNC: 7.2 G/DL (ref 6–8.4)
PROT UR-MCNC: 10 MG/DL (ref 0–15)
PROT/CREAT UR: 0.1 MG/G{CREAT} (ref 0–0.2)
SODIUM SERPL-SCNC: 136 MMOL/L (ref 136–145)
TRIGL SERPL-MCNC: 108 MG/DL (ref 30–150)
TSH SERPL DL<=0.005 MIU/L-ACNC: 2.68 UIU/ML (ref 0.4–4)

## 2021-10-12 ENCOUNTER — TELEPHONE (OUTPATIENT)
Dept: FAMILY MEDICINE | Facility: CLINIC | Age: 86
End: 2021-10-12

## 2021-10-18 PROBLEM — I42.1 OBSTRUCTIVE HYPERTROPHIC CARDIOMYOPATHY: Status: ACTIVE | Noted: 2021-10-18

## 2021-10-18 PROBLEM — E11.9 TYPE 2 DIABETES MELLITUS WITHOUT COMPLICATION, WITHOUT LONG-TERM CURRENT USE OF INSULIN: Status: ACTIVE | Noted: 2021-10-18

## 2021-10-18 PROBLEM — Z87.898 HISTORY OF ANGIOEDEMA: Status: ACTIVE | Noted: 2021-10-18

## 2021-10-19 ENCOUNTER — HOSPITAL ENCOUNTER (OUTPATIENT)
Dept: RADIOLOGY | Facility: HOSPITAL | Age: 86
Discharge: HOME | End: 2021-10-19
Attending: FAMILY MEDICINE
Payer: MEDICARE

## 2021-10-19 ENCOUNTER — OFFICE VISIT (OUTPATIENT)
Dept: INTERNAL MEDICINE | Facility: CLINIC | Age: 86
End: 2021-10-19
Payer: MEDICARE

## 2021-10-19 VITALS — WEIGHT: 146.38 LBS | HEIGHT: 59 IN | BODY MASS INDEX: 29.51 KG/M2

## 2021-10-19 VITALS
SYSTOLIC BLOOD PRESSURE: 116 MMHG | BODY MASS INDEX: 29.42 KG/M2 | DIASTOLIC BLOOD PRESSURE: 60 MMHG | HEIGHT: 59 IN | WEIGHT: 145.94 LBS

## 2021-10-19 DIAGNOSIS — Z12.31 VISIT FOR SCREENING MAMMOGRAM: ICD-10-CM

## 2021-10-19 DIAGNOSIS — I42.1 OBSTRUCTIVE HYPERTROPHIC CARDIOMYOPATHY: ICD-10-CM

## 2021-10-19 DIAGNOSIS — E11.59 HYPERTENSION ASSOCIATED WITH DIABETES: ICD-10-CM

## 2021-10-19 DIAGNOSIS — S06.5XAA SUBDURAL HEMATOMA: ICD-10-CM

## 2021-10-19 DIAGNOSIS — I35.0 NONRHEUMATIC AORTIC VALVE STENOSIS: ICD-10-CM

## 2021-10-19 DIAGNOSIS — Z87.898 HISTORY OF ANGIOEDEMA: ICD-10-CM

## 2021-10-19 DIAGNOSIS — R56.9 SEIZURE: ICD-10-CM

## 2021-10-19 DIAGNOSIS — Z74.09 OTHER REDUCED MOBILITY: ICD-10-CM

## 2021-10-19 DIAGNOSIS — Z00.00 ENCOUNTER FOR PREVENTIVE HEALTH EXAMINATION: Primary | ICD-10-CM

## 2021-10-19 DIAGNOSIS — Z99.89 DEPENDENCE ON OTHER ENABLING MACHINES AND DEVICES: ICD-10-CM

## 2021-10-19 DIAGNOSIS — E11.9 TYPE 2 DIABETES MELLITUS WITHOUT COMPLICATION, WITHOUT LONG-TERM CURRENT USE OF INSULIN: ICD-10-CM

## 2021-10-19 DIAGNOSIS — I15.2 HYPERTENSION ASSOCIATED WITH DIABETES: ICD-10-CM

## 2021-10-19 DIAGNOSIS — E03.9 HYPOTHYROIDISM, UNSPECIFIED TYPE: ICD-10-CM

## 2021-10-19 DIAGNOSIS — H40.1133 PRIMARY OPEN ANGLE GLAUCOMA (POAG) OF BOTH EYES, SEVERE STAGE: ICD-10-CM

## 2021-10-19 DIAGNOSIS — E66.3 OVERWEIGHT (BMI 25.0-29.9): ICD-10-CM

## 2021-10-19 PROCEDURE — 1126F PR PAIN SEVERITY QUANTIFIED, NO PAIN PRESENT: ICD-10-PCS | Mod: CPTII,S$GLB,, | Performed by: PHYSICIAN ASSISTANT

## 2021-10-19 PROCEDURE — 3288F PR FALLS RISK ASSESSMENT DOCUMENTED: ICD-10-PCS | Mod: CPTII,S$GLB,, | Performed by: PHYSICIAN ASSISTANT

## 2021-10-19 PROCEDURE — 77063 MAMMO DIGITAL SCREENING BILAT WITH TOMO: ICD-10-PCS | Mod: 26,,, | Performed by: RADIOLOGY

## 2021-10-19 PROCEDURE — 1170F PR FUNCTIONAL STATUS ASSESSED: ICD-10-PCS | Mod: CPTII,S$GLB,, | Performed by: PHYSICIAN ASSISTANT

## 2021-10-19 PROCEDURE — 1101F PT FALLS ASSESS-DOCD LE1/YR: CPT | Mod: CPTII,S$GLB,, | Performed by: PHYSICIAN ASSISTANT

## 2021-10-19 PROCEDURE — 3288F FALL RISK ASSESSMENT DOCD: CPT | Mod: CPTII,S$GLB,, | Performed by: PHYSICIAN ASSISTANT

## 2021-10-19 PROCEDURE — 1126F AMNT PAIN NOTED NONE PRSNT: CPT | Mod: CPTII,S$GLB,, | Performed by: PHYSICIAN ASSISTANT

## 2021-10-19 PROCEDURE — G0439 PR MEDICARE ANNUAL WELLNESS SUBSEQUENT VISIT: ICD-10-PCS | Mod: S$GLB,,, | Performed by: PHYSICIAN ASSISTANT

## 2021-10-19 PROCEDURE — 1170F FXNL STATUS ASSESSED: CPT | Mod: CPTII,S$GLB,, | Performed by: PHYSICIAN ASSISTANT

## 2021-10-19 PROCEDURE — 77067 MAMMO DIGITAL SCREENING BILAT WITH TOMO: ICD-10-PCS | Mod: 26,,, | Performed by: RADIOLOGY

## 2021-10-19 PROCEDURE — 99999 PR PBB SHADOW E&M-EST. PATIENT-LVL III: CPT | Mod: PBBFAC,,, | Performed by: PHYSICIAN ASSISTANT

## 2021-10-19 PROCEDURE — 99999 PR PBB SHADOW E&M-EST. PATIENT-LVL III: ICD-10-PCS | Mod: PBBFAC,,, | Performed by: PHYSICIAN ASSISTANT

## 2021-10-19 PROCEDURE — 1159F MED LIST DOCD IN RCRD: CPT | Mod: CPTII,S$GLB,, | Performed by: PHYSICIAN ASSISTANT

## 2021-10-19 PROCEDURE — G0439 PPPS, SUBSEQ VISIT: HCPCS | Mod: S$GLB,,, | Performed by: PHYSICIAN ASSISTANT

## 2021-10-19 PROCEDURE — 1160F PR REVIEW ALL MEDS BY PRESCRIBER/CLIN PHARMACIST DOCUMENTED: ICD-10-PCS | Mod: CPTII,S$GLB,, | Performed by: PHYSICIAN ASSISTANT

## 2021-10-19 PROCEDURE — 99499 RISK ADDL DX/OHS AUDIT: ICD-10-PCS | Mod: S$GLB,,, | Performed by: PHYSICIAN ASSISTANT

## 2021-10-19 PROCEDURE — 77063 BREAST TOMOSYNTHESIS BI: CPT | Mod: 26,,, | Performed by: RADIOLOGY

## 2021-10-19 PROCEDURE — 77067 SCR MAMMO BI INCL CAD: CPT | Mod: TC

## 2021-10-19 PROCEDURE — 1101F PR PT FALLS ASSESS DOC 0-1 FALLS W/OUT INJ PAST YR: ICD-10-PCS | Mod: CPTII,S$GLB,, | Performed by: PHYSICIAN ASSISTANT

## 2021-10-19 PROCEDURE — 1159F PR MEDICATION LIST DOCUMENTED IN MEDICAL RECORD: ICD-10-PCS | Mod: CPTII,S$GLB,, | Performed by: PHYSICIAN ASSISTANT

## 2021-10-19 PROCEDURE — 1160F RVW MEDS BY RX/DR IN RCRD: CPT | Mod: CPTII,S$GLB,, | Performed by: PHYSICIAN ASSISTANT

## 2021-10-19 PROCEDURE — 77067 SCR MAMMO BI INCL CAD: CPT | Mod: 26,,, | Performed by: RADIOLOGY

## 2021-10-19 PROCEDURE — 99499 UNLISTED E&M SERVICE: CPT | Mod: S$GLB,,, | Performed by: PHYSICIAN ASSISTANT

## 2021-10-22 DIAGNOSIS — E03.9 HYPOTHYROIDISM, UNSPECIFIED TYPE: ICD-10-CM

## 2021-10-23 RX ORDER — LEVOTHYROXINE SODIUM 50 UG/1
TABLET ORAL
Qty: 90 TABLET | Refills: 1 | Status: SHIPPED | OUTPATIENT
Start: 2021-10-23 | End: 2022-04-13

## 2021-10-29 ENCOUNTER — PATIENT OUTREACH (OUTPATIENT)
Dept: ADMINISTRATIVE | Facility: OTHER | Age: 86
End: 2021-10-29
Payer: MEDICARE

## 2021-11-10 ENCOUNTER — OFFICE VISIT (OUTPATIENT)
Dept: FAMILY MEDICINE | Facility: CLINIC | Age: 86
End: 2021-11-10
Payer: MEDICARE

## 2021-11-10 ENCOUNTER — HOSPITAL ENCOUNTER (OUTPATIENT)
Dept: RADIOLOGY | Facility: HOSPITAL | Age: 86
Discharge: HOME | End: 2021-11-10
Attending: REGISTERED NURSE
Payer: MEDICARE

## 2021-11-10 VITALS
TEMPERATURE: 97 F | DIASTOLIC BLOOD PRESSURE: 60 MMHG | RESPIRATION RATE: 18 BRPM | OXYGEN SATURATION: 96 % | WEIGHT: 147.38 LBS | HEART RATE: 69 BPM | BODY MASS INDEX: 29.71 KG/M2 | HEIGHT: 59 IN | SYSTOLIC BLOOD PRESSURE: 118 MMHG

## 2021-11-10 DIAGNOSIS — R05.9 COUGH: ICD-10-CM

## 2021-11-10 DIAGNOSIS — M54.9 ACUTE UPPER BACK PAIN: ICD-10-CM

## 2021-11-10 DIAGNOSIS — J30.9 ALLERGIC RHINITIS, UNSPECIFIED SEASONALITY, UNSPECIFIED TRIGGER: Primary | ICD-10-CM

## 2021-11-10 PROCEDURE — 3288F FALL RISK ASSESSMENT DOCD: CPT | Mod: CPTII,S$GLB,, | Performed by: REGISTERED NURSE

## 2021-11-10 PROCEDURE — 71046 XR CHEST PA AND LATERAL: ICD-10-PCS | Mod: 26,,, | Performed by: RADIOLOGY

## 2021-11-10 PROCEDURE — 1101F PR PT FALLS ASSESS DOC 0-1 FALLS W/OUT INJ PAST YR: ICD-10-PCS | Mod: CPTII,S$GLB,, | Performed by: REGISTERED NURSE

## 2021-11-10 PROCEDURE — 71046 X-RAY EXAM CHEST 2 VIEWS: CPT | Mod: TC,FY,PO

## 2021-11-10 PROCEDURE — 1159F PR MEDICATION LIST DOCUMENTED IN MEDICAL RECORD: ICD-10-PCS | Mod: CPTII,S$GLB,, | Performed by: REGISTERED NURSE

## 2021-11-10 PROCEDURE — 1101F PT FALLS ASSESS-DOCD LE1/YR: CPT | Mod: CPTII,S$GLB,, | Performed by: REGISTERED NURSE

## 2021-11-10 PROCEDURE — 71046 X-RAY EXAM CHEST 2 VIEWS: CPT | Mod: 26,,, | Performed by: RADIOLOGY

## 2021-11-10 PROCEDURE — 99213 OFFICE O/P EST LOW 20 MIN: CPT | Mod: S$GLB,,, | Performed by: REGISTERED NURSE

## 2021-11-10 PROCEDURE — 1125F PR PAIN SEVERITY QUANTIFIED, PAIN PRESENT: ICD-10-PCS | Mod: CPTII,S$GLB,, | Performed by: REGISTERED NURSE

## 2021-11-10 PROCEDURE — 99999 PR PBB SHADOW E&M-EST. PATIENT-LVL V: CPT | Mod: PBBFAC,,, | Performed by: REGISTERED NURSE

## 2021-11-10 PROCEDURE — 99213 PR OFFICE/OUTPT VISIT, EST, LEVL III, 20-29 MIN: ICD-10-PCS | Mod: S$GLB,,, | Performed by: REGISTERED NURSE

## 2021-11-10 PROCEDURE — 99999 PR PBB SHADOW E&M-EST. PATIENT-LVL V: ICD-10-PCS | Mod: PBBFAC,,, | Performed by: REGISTERED NURSE

## 2021-11-10 PROCEDURE — 3288F PR FALLS RISK ASSESSMENT DOCUMENTED: ICD-10-PCS | Mod: CPTII,S$GLB,, | Performed by: REGISTERED NURSE

## 2021-11-10 PROCEDURE — 1159F MED LIST DOCD IN RCRD: CPT | Mod: CPTII,S$GLB,, | Performed by: REGISTERED NURSE

## 2021-11-10 PROCEDURE — 1125F AMNT PAIN NOTED PAIN PRSNT: CPT | Mod: CPTII,S$GLB,, | Performed by: REGISTERED NURSE

## 2021-11-10 RX ORDER — BENZONATATE 100 MG/1
100 CAPSULE ORAL 3 TIMES DAILY PRN
Qty: 30 CAPSULE | Refills: 0 | Status: SHIPPED | OUTPATIENT
Start: 2021-11-10 | End: 2022-01-04

## 2021-11-29 ENCOUNTER — TELEPHONE (OUTPATIENT)
Dept: ALLERGY | Facility: CLINIC | Age: 86
End: 2021-11-29
Payer: MEDICARE

## 2021-12-19 DIAGNOSIS — E11.9 TYPE 2 DIABETES MELLITUS WITHOUT COMPLICATION, WITHOUT LONG-TERM CURRENT USE OF INSULIN: ICD-10-CM

## 2021-12-22 RX ORDER — SITAGLIPTIN 100 MG/1
TABLET, FILM COATED ORAL
Qty: 90 TABLET | Refills: 1 | Status: SHIPPED | OUTPATIENT
Start: 2021-12-22 | End: 2022-07-03

## 2022-01-04 ENCOUNTER — OFFICE VISIT (OUTPATIENT)
Dept: INTERNAL MEDICINE | Facility: CLINIC | Age: 87
End: 2022-01-04
Payer: MEDICARE

## 2022-01-04 ENCOUNTER — LAB VISIT (OUTPATIENT)
Dept: LAB | Facility: HOSPITAL | Age: 87
End: 2022-01-04
Attending: FAMILY MEDICINE
Payer: MEDICARE

## 2022-01-04 VITALS
WEIGHT: 150.13 LBS | DIASTOLIC BLOOD PRESSURE: 50 MMHG | TEMPERATURE: 99 F | HEART RATE: 78 BPM | HEIGHT: 59 IN | BODY MASS INDEX: 30.27 KG/M2 | SYSTOLIC BLOOD PRESSURE: 116 MMHG | OXYGEN SATURATION: 96 %

## 2022-01-04 DIAGNOSIS — E03.9 HYPOTHYROIDISM, UNSPECIFIED TYPE: ICD-10-CM

## 2022-01-04 DIAGNOSIS — E11.9 TYPE 2 DIABETES MELLITUS WITHOUT COMPLICATION, WITHOUT LONG-TERM CURRENT USE OF INSULIN: ICD-10-CM

## 2022-01-04 DIAGNOSIS — E66.9 OBESITY (BMI 30.0-34.9): ICD-10-CM

## 2022-01-04 DIAGNOSIS — I42.1 OBSTRUCTIVE HYPERTROPHIC CARDIOMYOPATHY: ICD-10-CM

## 2022-01-04 DIAGNOSIS — I35.0 NONRHEUMATIC AORTIC VALVE STENOSIS: ICD-10-CM

## 2022-01-04 DIAGNOSIS — I15.2 HYPERTENSION ASSOCIATED WITH DIABETES: ICD-10-CM

## 2022-01-04 DIAGNOSIS — E11.59 HYPERTENSION ASSOCIATED WITH DIABETES: ICD-10-CM

## 2022-01-04 DIAGNOSIS — R05.9 COUGH: ICD-10-CM

## 2022-01-04 DIAGNOSIS — H40.1133 PRIMARY OPEN ANGLE GLAUCOMA (POAG) OF BOTH EYES, SEVERE STAGE: ICD-10-CM

## 2022-01-04 LAB
ESTIMATED AVG GLUCOSE: 126 MG/DL (ref 68–131)
HBA1C MFR BLD: 6 % (ref 4–5.6)

## 2022-01-04 PROCEDURE — 1126F PR PAIN SEVERITY QUANTIFIED, NO PAIN PRESENT: ICD-10-PCS | Mod: CPTII,S$GLB,, | Performed by: FAMILY MEDICINE

## 2022-01-04 PROCEDURE — 99214 OFFICE O/P EST MOD 30 MIN: CPT | Mod: S$GLB,,, | Performed by: FAMILY MEDICINE

## 2022-01-04 PROCEDURE — 1126F AMNT PAIN NOTED NONE PRSNT: CPT | Mod: CPTII,S$GLB,, | Performed by: FAMILY MEDICINE

## 2022-01-04 PROCEDURE — 1101F PT FALLS ASSESS-DOCD LE1/YR: CPT | Mod: CPTII,S$GLB,, | Performed by: FAMILY MEDICINE

## 2022-01-04 PROCEDURE — 3288F PR FALLS RISK ASSESSMENT DOCUMENTED: ICD-10-PCS | Mod: CPTII,S$GLB,, | Performed by: FAMILY MEDICINE

## 2022-01-04 PROCEDURE — 3288F FALL RISK ASSESSMENT DOCD: CPT | Mod: CPTII,S$GLB,, | Performed by: FAMILY MEDICINE

## 2022-01-04 PROCEDURE — 99999 PR PBB SHADOW E&M-EST. PATIENT-LVL V: CPT | Mod: PBBFAC,,, | Performed by: FAMILY MEDICINE

## 2022-01-04 PROCEDURE — 1159F MED LIST DOCD IN RCRD: CPT | Mod: CPTII,S$GLB,, | Performed by: FAMILY MEDICINE

## 2022-01-04 PROCEDURE — 83036 HEMOGLOBIN GLYCOSYLATED A1C: CPT | Performed by: FAMILY MEDICINE

## 2022-01-04 PROCEDURE — 36415 COLL VENOUS BLD VENIPUNCTURE: CPT | Performed by: FAMILY MEDICINE

## 2022-01-04 PROCEDURE — 1160F PR REVIEW ALL MEDS BY PRESCRIBER/CLIN PHARMACIST DOCUMENTED: ICD-10-PCS | Mod: CPTII,S$GLB,, | Performed by: FAMILY MEDICINE

## 2022-01-04 PROCEDURE — 1101F PR PT FALLS ASSESS DOC 0-1 FALLS W/OUT INJ PAST YR: ICD-10-PCS | Mod: CPTII,S$GLB,, | Performed by: FAMILY MEDICINE

## 2022-01-04 PROCEDURE — 1159F PR MEDICATION LIST DOCUMENTED IN MEDICAL RECORD: ICD-10-PCS | Mod: CPTII,S$GLB,, | Performed by: FAMILY MEDICINE

## 2022-01-04 PROCEDURE — 99999 PR PBB SHADOW E&M-EST. PATIENT-LVL V: ICD-10-PCS | Mod: PBBFAC,,, | Performed by: FAMILY MEDICINE

## 2022-01-04 PROCEDURE — 1160F RVW MEDS BY RX/DR IN RCRD: CPT | Mod: CPTII,S$GLB,, | Performed by: FAMILY MEDICINE

## 2022-01-04 PROCEDURE — 99214 PR OFFICE/OUTPT VISIT, EST, LEVL IV, 30-39 MIN: ICD-10-PCS | Mod: S$GLB,,, | Performed by: FAMILY MEDICINE

## 2022-01-04 RX ORDER — CODEINE PHOSPHATE AND GUAIFENESIN 10; 100 MG/5ML; MG/5ML
5 SOLUTION ORAL EVERY 8 HOURS PRN
Qty: 118 ML | Refills: 0 | Status: SHIPPED | OUTPATIENT
Start: 2022-01-04 | End: 2022-01-11

## 2022-01-04 NOTE — PROGRESS NOTES
Edie Hammond    Chief Complaint   Patient presents with    Follow-up    Diabetes    Hypertension       History of Present Illness:   Ms. Hammond comes in today for 3-month hypertension and diabetes follow-up.  She is not fasting but has taken medications today.  She states she monitors her diet.  She states she exercises 6-7 times per week, 10-20 minutes each time.  She states she occasionally performs home blood pressure checks with levels ranging 120-130/60's.  She states she performs home glucose checks mornings with levels lately ranging <100's and after dinner levels ranging <200's (140-160's).    She reports having nontraumatic, occasional ache in her back with movement or with breathing.  She denies having pain today.  She states she takes Tylenol arthritis with help.    She reports having occasional hand pain due to osteoarthritis.    She reports feeling sleepy in the morning for few weeks despite sleeping 8 hours at night.  She states symptoms was ongoing before her last visit with me.    She reports having rare cough possibly with postnasal drip.  She states she occasionally takes Protonix 40 mg daily with help.  She requests refill of Robitussin-codeine for cough.    Otherwise, she denies having fever, chills, fatigue, appetite changes; shortness of breath, wheezing; chest pain, palpitations, leg swelling; abdominal pain, nausea, vomiting, diarrhea, constipation; unusual urinary symptoms; polydipsia, polyphagia, polyuria, hot or cold intolerance; headache; anxiety, depression, homicidal or suicidal thoughts.      She saw Dr. 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     She saw Dr. Sidney Reeves, ophthalmologist, July 19, 2021 for surveillance of glaucoma.      Labs:                      WBC                      9.25                10/04/2021                  HGB                      12.1                10/04/2021                 HCT                      38.1                10/04/2021                 PLT                      290                 10/04/2021                 CHOL                     180                 10/04/2021                 TRIG                     108                 10/04/2021                 HDL                      66                  10/04/2021                 ALT                      9 (L)               10/04/2021                 AST                      19                  10/04/2021                 NA                       136                 10/04/2021                 K                        4.2                 10/04/2021                 CL                       101                 10/04/2021                 CREATININE               0.7                 10/04/2021                 BUN                      16                  10/04/2021                 CO2                      24                  10/04/2021                 TSH                      2.679               10/04/2021                 HGBA1C                   6.1 (H)             10/04/2021           LDLCALC                  92.4                10/04/2021                Current Outpatient Medications   Medication Sig    amLODIPine (NORVASC) 5 MG tablet Take 5 mg by mouth once daily.          blood sugar diagnostic (BLOOD GLUCOSE TEST) Strp Use twice daily prn. Dx: E11.9    blood-glucose meter kit Use as instructed - dx: E11.9    bumetanide (BUMEX) 1 MG tablet But also states some times takes 3 times per week    cetirizine (ZYRTEC) 10 MG tablet Take 10 mg by mouth once daily.     cloNIDine (CATAPRES) 0.1 MG tablet Take 0.1 mg by mouth daily as needed (takes only if SBP > 180 per cardiologist).     dorzolamide-timolol 2-0.5% (COSOPT) 22.3-6.8 mg/mL ophthalmic solution 1 drop.    doxazosin (CARDURA) 1 MG tablet Take 1 mg by mouth nightly.    hydrALAZINE (APRESOLINE) 100 MG  tablet TAKE ONE TABLET BY MOUTH THREE TIMES DAILY    JANUVIA 100 mg Tab TAKE ONE TABLET BY MOUTH ONE TIME DAILY    lancets Misc Use twice daily prn. Dx: E11.9    latanoprost 0.005 % ophthalmic solution SMARTSI Drop(s) In Eye(s) Every Evening    levothyroxine (SYNTHROID) 50 MCG tablet TAKE ONE TABLET BY MOUTH ONCE BEFORE BREAKFAST DAILY    montelukast (SINGULAIR) 10 mg tablet TAKE ONE TABLET BY MOUTH IN THE EVENING    multivitamin with minerals tablet Take 1 tablet by mouth once daily.     nystatin (MYCOSTATIN) cream APPLY TOPICALLY TWICE DAILY AS NEEDED FOR IRRITATED  SKIN    EPINEPHrine (EPIPEN) 0.3 mg/0.3 mL AtIn Inject 0.3 mLs (0.3 mg total) into the muscle once. for 1 dose (Patient not taking: Reported on 2022)       Review of Systems   Constitutional: Positive for activity change. Negative for appetite change, chills, fatigue and fever.        Weight  66.4 kg (146 lb 6.2 oz) at 2021 visit.   Eyes:        See history of present illness.   Respiratory: Positive for cough. Negative for shortness of breath and wheezing.    Cardiovascular: Negative for chest pain, palpitations and leg swelling.        See history of present illness.   Gastrointestinal: Negative for abdominal pain, constipation, diarrhea, nausea and vomiting.   Endocrine: Negative for cold intolerance, heat intolerance, polydipsia, polyphagia and polyuria.        See history of present illness.   Genitourinary: Negative for difficulty urinating.   Musculoskeletal: Positive for arthralgias and back pain.        See history of present illness.     Neurological: Negative for headaches.   Psychiatric/Behavioral: Positive for sleep disturbance. Negative for confusion, dysphoric mood and suicidal ideas. The patient is not nervous/anxious.         Negative for homicidal ideas. See history of present illness.       Objective:  Physical Exam  Vitals reviewed.   Constitutional:       General: She is not in acute distress.      Appearance: Normal appearance. She is well-developed. She is obese. She is not ill-appearing, toxic-appearing or diaphoretic.      Comments: Pleasant.   Neck:      Thyroid: No thyromegaly.   Cardiovascular:      Rate and Rhythm: Normal rate and regular rhythm.      Pulses:           Dorsalis pedis pulses are 3+ on the right side and 3+ on the left side.        Posterior tibial pulses are 3+ on the right side and 3+ on the left side.      Heart sounds: Murmur heard.        Comments: Chronic per patient (murmur noted).  Pulmonary:      Effort: Pulmonary effort is normal. No respiratory distress.      Breath sounds: Normal breath sounds. No stridor. No wheezing.   Abdominal:      General: Bowel sounds are normal. There is no distension.      Palpations: Abdomen is soft. There is no mass.      Tenderness: There is no abdominal tenderness. There is no guarding.   Musculoskeletal:         General: No swelling or tenderness. Normal range of motion.      Cervical back: Normal range of motion and neck supple.      Comments: Hypertrophied toenails noted. She is ambulatory with assistance of cane.   Feet:      Right foot:      Protective Sensation: 5 sites tested. 5 sites sensed.      Skin integrity: No ulcer or skin breakdown.      Left foot:      Protective Sensation: 5 sites tested. 5 sites sensed.      Skin integrity: No ulcer or skin breakdown.   Lymphadenopathy:      Cervical: No cervical adenopathy.   Neurological:      General: No focal deficit present.      Mental Status: She is alert and oriented to person, place, and time.   Psychiatric:         Mood and Affect: Mood normal.         Behavior: Behavior normal.         Thought Content: Thought content normal.         Judgment: Judgment normal.         ASSESSMENT:  1. Hypertension associated with diabetes    2. Type 2 diabetes mellitus without complication, without long-term current use of insulin    3. Nonrheumatic aortic valve stenosis    4. Obstructive hypertrophic  cardiomyopathy    5. Hypothyroidism, unspecified type    6. Cough    7. Primary open angle glaucoma (POAG) of both eyes, severe stage    8. Obesity (BMI 30.0-34.9)        PLAN:  Edie was seen today for follow-up, diabetes and hypertension.    Diagnoses and all orders for this visit:    Hypertension associated with diabetes    Type 2 diabetes mellitus without complication, without long-term current use of insulin  -     Hemoglobin A1C; Future    Nonrheumatic aortic valve stenosis    Obstructive hypertrophic cardiomyopathy    Hypothyroidism, unspecified type    Cough  -     guaiFENesin-codeine 100-10 mg/5 ml (TUSSI-ORGANIDIN NR)  mg/5 mL syrup; Take 5 mLs by mouth every 8 (eight) hours as needed for Cough.    Primary open angle glaucoma (POAG) of both eyes, severe stage    Obesity (BMI 30.0-34.9)       Patient advised to call for results.  Continue current medications, follow low sodium, low cholesterol, low carb diet, daily walks.  Prescription refill as noted above.  Keep follow up with specialists.  Follow up in about 3 months (around 4/11/2022) for hypertension and diabetes follow up.

## 2022-01-08 DIAGNOSIS — I15.2 HYPERTENSION ASSOCIATED WITH DIABETES: ICD-10-CM

## 2022-01-08 DIAGNOSIS — E11.59 HYPERTENSION ASSOCIATED WITH DIABETES: ICD-10-CM

## 2022-01-09 PROBLEM — S06.5XAA SUBDURAL HEMATOMA: Status: RESOLVED | Noted: 2017-04-08 | Resolved: 2022-01-09

## 2022-01-10 NOTE — TELEPHONE ENCOUNTER
LV-1/4/22   LAV-10/4/21  Upcoming appt- 4/22/22    Pt requesting- hydrALAZINE (APRESOLINE) 100 MG tablet    Last fill- 8/2/21

## 2022-01-11 RX ORDER — HYDRALAZINE HYDROCHLORIDE 100 MG/1
TABLET, FILM COATED ORAL
Qty: 270 TABLET | Refills: 1 | Status: SHIPPED | OUTPATIENT
Start: 2022-01-11 | End: 2022-07-26

## 2022-03-03 ENCOUNTER — TELEPHONE (OUTPATIENT)
Dept: ADMINISTRATIVE | Facility: CLINIC | Age: 87
End: 2022-03-03
Payer: MEDICARE

## 2022-03-03 ENCOUNTER — PES CALL (OUTPATIENT)
Dept: ADMINISTRATIVE | Facility: CLINIC | Age: 87
End: 2022-03-03
Payer: MEDICARE

## 2022-03-03 NOTE — TELEPHONE ENCOUNTER
Ms Hammond call the office to schedule her AWV for 2022. She has been scheduled for 3/11/22 at the [facility] location.

## 2022-03-16 ENCOUNTER — OFFICE VISIT (OUTPATIENT)
Dept: FAMILY MEDICINE | Facility: CLINIC | Age: 87
End: 2022-03-16
Payer: MEDICARE

## 2022-03-16 VITALS
DIASTOLIC BLOOD PRESSURE: 53 MMHG | SYSTOLIC BLOOD PRESSURE: 133 MMHG | OXYGEN SATURATION: 97 % | BODY MASS INDEX: 29.16 KG/M2 | TEMPERATURE: 98 F | RESPIRATION RATE: 18 BRPM | HEART RATE: 64 BPM | HEIGHT: 59 IN | WEIGHT: 144.63 LBS

## 2022-03-16 DIAGNOSIS — R26.81 GAIT INSTABILITY: ICD-10-CM

## 2022-03-16 DIAGNOSIS — H91.93 BILATERAL HEARING LOSS, UNSPECIFIED HEARING LOSS TYPE: ICD-10-CM

## 2022-03-16 DIAGNOSIS — I35.0 NONRHEUMATIC AORTIC VALVE STENOSIS: ICD-10-CM

## 2022-03-16 DIAGNOSIS — I15.2 HYPERTENSION ASSOCIATED WITH DIABETES: ICD-10-CM

## 2022-03-16 DIAGNOSIS — E11.59 HYPERTENSION ASSOCIATED WITH DIABETES: ICD-10-CM

## 2022-03-16 DIAGNOSIS — E11.9 TYPE 2 DIABETES MELLITUS WITHOUT COMPLICATION, WITHOUT LONG-TERM CURRENT USE OF INSULIN: ICD-10-CM

## 2022-03-16 DIAGNOSIS — H40.1133 PRIMARY OPEN ANGLE GLAUCOMA (POAG) OF BOTH EYES, SEVERE STAGE: ICD-10-CM

## 2022-03-16 DIAGNOSIS — Z99.89 DEPENDENCE ON OTHER ENABLING MACHINES AND DEVICES: ICD-10-CM

## 2022-03-16 DIAGNOSIS — K21.9 GASTROESOPHAGEAL REFLUX DISEASE, UNSPECIFIED WHETHER ESOPHAGITIS PRESENT: ICD-10-CM

## 2022-03-16 DIAGNOSIS — I42.1 OBSTRUCTIVE HYPERTROPHIC CARDIOMYOPATHY: ICD-10-CM

## 2022-03-16 DIAGNOSIS — Z86.79 HISTORY OF SUBDURAL HEMATOMA: ICD-10-CM

## 2022-03-16 DIAGNOSIS — E03.9 HYPOTHYROIDISM, UNSPECIFIED TYPE: ICD-10-CM

## 2022-03-16 DIAGNOSIS — Z00.00 ENCOUNTER FOR PREVENTIVE HEALTH EXAMINATION: Primary | ICD-10-CM

## 2022-03-16 PROBLEM — H40.1130 PRIMARY OPEN ANGLE GLAUCOMA (POAG) OF BOTH EYES: Status: ACTIVE | Noted: 2022-03-16

## 2022-03-16 PROCEDURE — 99999 PR PBB SHADOW E&M-EST. PATIENT-LVL V: ICD-10-PCS | Mod: PBBFAC,,, | Performed by: NURSE PRACTITIONER

## 2022-03-16 PROCEDURE — 1170F PR FUNCTIONAL STATUS ASSESSED: ICD-10-PCS | Mod: CPTII,S$GLB,, | Performed by: NURSE PRACTITIONER

## 2022-03-16 PROCEDURE — 1170F FXNL STATUS ASSESSED: CPT | Mod: CPTII,S$GLB,, | Performed by: NURSE PRACTITIONER

## 2022-03-16 PROCEDURE — 1159F PR MEDICATION LIST DOCUMENTED IN MEDICAL RECORD: ICD-10-PCS | Mod: CPTII,S$GLB,, | Performed by: NURSE PRACTITIONER

## 2022-03-16 PROCEDURE — 1160F RVW MEDS BY RX/DR IN RCRD: CPT | Mod: CPTII,S$GLB,, | Performed by: NURSE PRACTITIONER

## 2022-03-16 PROCEDURE — 3288F FALL RISK ASSESSMENT DOCD: CPT | Mod: CPTII,S$GLB,, | Performed by: NURSE PRACTITIONER

## 2022-03-16 PROCEDURE — 3288F PR FALLS RISK ASSESSMENT DOCUMENTED: ICD-10-PCS | Mod: CPTII,S$GLB,, | Performed by: NURSE PRACTITIONER

## 2022-03-16 PROCEDURE — G0439 PR MEDICARE ANNUAL WELLNESS SUBSEQUENT VISIT: ICD-10-PCS | Mod: S$GLB,,, | Performed by: NURSE PRACTITIONER

## 2022-03-16 PROCEDURE — 1101F PT FALLS ASSESS-DOCD LE1/YR: CPT | Mod: CPTII,S$GLB,, | Performed by: NURSE PRACTITIONER

## 2022-03-16 PROCEDURE — 1159F MED LIST DOCD IN RCRD: CPT | Mod: CPTII,S$GLB,, | Performed by: NURSE PRACTITIONER

## 2022-03-16 PROCEDURE — 1160F PR REVIEW ALL MEDS BY PRESCRIBER/CLIN PHARMACIST DOCUMENTED: ICD-10-PCS | Mod: CPTII,S$GLB,, | Performed by: NURSE PRACTITIONER

## 2022-03-16 PROCEDURE — G0439 PPPS, SUBSEQ VISIT: HCPCS | Mod: S$GLB,,, | Performed by: NURSE PRACTITIONER

## 2022-03-16 PROCEDURE — 1126F AMNT PAIN NOTED NONE PRSNT: CPT | Mod: CPTII,S$GLB,, | Performed by: NURSE PRACTITIONER

## 2022-03-16 PROCEDURE — 1101F PR PT FALLS ASSESS DOC 0-1 FALLS W/OUT INJ PAST YR: ICD-10-PCS | Mod: CPTII,S$GLB,, | Performed by: NURSE PRACTITIONER

## 2022-03-16 PROCEDURE — 99499 UNLISTED E&M SERVICE: CPT | Mod: S$GLB,,, | Performed by: NURSE PRACTITIONER

## 2022-03-16 PROCEDURE — 99999 PR PBB SHADOW E&M-EST. PATIENT-LVL V: CPT | Mod: PBBFAC,,, | Performed by: NURSE PRACTITIONER

## 2022-03-16 PROCEDURE — 99499 RISK ADDL DX/OHS AUDIT: ICD-10-PCS | Mod: S$GLB,,, | Performed by: NURSE PRACTITIONER

## 2022-03-16 PROCEDURE — 1126F PR PAIN SEVERITY QUANTIFIED, NO PAIN PRESENT: ICD-10-PCS | Mod: CPTII,S$GLB,, | Performed by: NURSE PRACTITIONER

## 2022-03-16 NOTE — PROGRESS NOTES
"  Edie Hammond presented for a  Medicare AWV and comprehensive Health Risk Assessment today. The following components were reviewed and updated:    · Medical history  · Family History  · Social history  · Allergies and Current Medications  · Health Risk Assessment  · Health Maintenance  · Care Team         ** See Completed Assessments for Annual Wellness Visit within the encounter summary.**         The following assessments were completed:  · Living Situation  · CAGE  · Depression Screening  · Timed Get Up and Go  · Whisper Test  · Cognitive Function Screening  · Nutrition Screening  · ADL Screening  · PAQ Screening        Vitals:    03/16/22 1309   BP: (!) 133/53   Pulse: 64   Resp: 18   Temp: 97.5 °F (36.4 °C)   SpO2: 97%   Weight: 65.6 kg (144 lb 10 oz)   Height: 4' 11" (1.499 m)     Body mass index is 29.21 kg/m².  Physical Exam  Vitals and nursing note reviewed.   Constitutional:       Appearance: Normal appearance. She is well-developed.   HENT:      Head: Normocephalic and atraumatic.   Eyes:      Pupils: Pupils are equal, round, and reactive to light.   Neck:      Vascular: No carotid bruit.   Cardiovascular:      Rate and Rhythm: Normal rate and regular rhythm.      Pulses: Normal pulses.      Heart sounds: Murmur heard.     No gallop.   Pulmonary:      Effort: Pulmonary effort is normal.      Breath sounds: Normal breath sounds.   Abdominal:      General: Bowel sounds are normal. There is no distension.      Palpations: Abdomen is soft.      Tenderness: There is no abdominal tenderness.   Musculoskeletal:         General: No tenderness. Normal range of motion.   Skin:     General: Skin is warm and dry.   Neurological:      Mental Status: She is alert.      Motor: No abnormal muscle tone.      Gait: Gait abnormal.   Psychiatric:         Speech: Speech normal.         Behavior: Behavior normal.         Thought Content: Thought content normal.         Judgment: Judgment normal.             Current " Outpatient Medications:     amLODIPine (NORVASC) 5 MG tablet, Take 5 mg by mouth once daily. , Disp: , Rfl:     cetirizine (ZYRTEC) 10 MG tablet, Take 10 mg by mouth once daily. , Disp: , Rfl:     dorzolamide-timolol 2-0.5% (COSOPT) 22.3-6.8 mg/mL ophthalmic solution, 1 drop., Disp: , Rfl:     doxazosin (CARDURA) 1 MG tablet, Take 1 mg by mouth nightly., Disp: , Rfl:     hydrALAZINE (APRESOLINE) 100 MG tablet, TAKE ONE TABLET BY MOUTH THREE TIMES DAILY, Disp: 270 tablet, Rfl: 1    JANUVIA 100 mg Tab, TAKE ONE TABLET BY MOUTH ONE TIME DAILY, Disp: 90 tablet, Rfl: 1    latanoprost 0.005 % ophthalmic solution, SMARTSI Drop(s) In Eye(s) Every Evening, Disp: , Rfl:     levothyroxine (SYNTHROID) 50 MCG tablet, TAKE ONE TABLET BY MOUTH ONCE BEFORE BREAKFAST DAILY, Disp: 90 tablet, Rfl: 1    montelukast (SINGULAIR) 10 mg tablet, TAKE ONE TABLET BY MOUTH IN THE EVENING, Disp: 30 tablet, Rfl: 0    multivitamin with minerals tablet, Take 1 tablet by mouth once daily. , Disp: , Rfl:     blood sugar diagnostic (BLOOD GLUCOSE TEST) Strp, Use twice daily prn. Dx: E11.9, Disp: 300 each, Rfl: 3    blood-glucose meter kit, Use as instructed - dx: E11.9, Disp: 1 each, Rfl: 0    bumetanide (BUMEX) 1 MG tablet, But also states some times takes 3 times per week, Disp: , Rfl:     clonidine (CATAPRES) 0.1 MG tablet, Take 0.1 mg by mouth daily as needed (takes only if SBP > 180 per cardiologist). , Disp: , Rfl:     EPINEPHrine (EPIPEN) 0.3 mg/0.3 mL AtIn, Inject 0.3 mLs (0.3 mg total) into the muscle once. for 1 dose (Patient not taking: Reported on 2022), Disp: 2 Device, Rfl: 3    lancets Misc, Use twice daily prn. Dx: E11.9, Disp: 300 each, Rfl: 3    nystatin (MYCOSTATIN) cream, APPLY TOPICALLY TWICE DAILY AS NEEDED FOR IRRITATED  SKIN, Disp: 30 g, Rfl: 3      Diagnoses and health risks identified today and associated recommendations/orders:    1. Encounter for preventive health examination    2. Type 2 diabetes  mellitus without complication, without long-term current use of insulin  Hemoglobin A1C 4.0 - 5.6 % 6.0 High   6.1 High  CM    Chronic and Stable on Janiva . Continue current treatment plan as previously prescribed with your PCP    3. Hypertension associated with diabetes  Chronic and Stable on Catapres, Bumex , Norvasc and Hydralazine.   Continue current treatment plan as previously prescribed with your cardiologist    4. Obstructive hypertrophic cardiomyopathy  Chronic and Stable.  See # 3 states echo last month   Continue current treatment plan as previously prescribed with your cardiologist    5. Hypothyroidism, unspecified type   Chronic and Stable on Synthroid. Continue current treatment plan as previously prescribed with your PCP    6. Non rheumatic aortic valve stenosis   Chronic and Stable.  Asymptomatic Continue current treatment plan as previously prescribed with your cardiologist    7. Primary open angle glaucoma (POAG) of both eyes, severe stage  Chronic and Stable on Latanoprost and Co spot . Continue current treatment plan as previously prescribed with your pathologist    8. Bilateral hearing loss, unspecified hearing loss type  Chronic and Stable  Hearing aides.Followed by audiologist    9. 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 Gastroesophageal reflux disease, unspecified whether esophagitis present   Pt request to restart Protonix for acid reflux     I offered to discuss advanced care planning, including how to pick a person who would make decisions for you if you were unable to make them for yourself, called a health care power of , and what kind of decisions you might make such as use of life sustaining treatments such as ventilators and tube feeding when  faced with a life limiting illness recorded on a living will that they will need to know. (How you want to be cared for as you near the end of your natural life)   X Patient has one    Provided Edie with a 5-10 year written screening schedule and personal prevention plan. Recommendations were developed using the [employer]PH[employer] age appropriate recommendations. Education, counseling, and referrals were provided as needed. After Visit Summary printed and given to patient which includes a list of additional screenings\tests needed.    Follow up in about 1 year (around 3/16/2023) for Follow up with PCP.    Marie Doan NP

## 2022-03-16 NOTE — PATIENT INSTRUCTIONS
Counseling and Referral of Other Preventative  (Italic type indicates deductible and co-insurance are waived)    Patient Name: Edie Hammond  Today's Date: 3/16/2022    Health Maintenance       Date Due Completion Date    Diabetes Urine Screening Never done ---    TETANUS VACCINE Never done ---    Shingles Vaccine (2 of 3) 02/10/2016 12/16/2015    Hemoglobin A1c 07/04/2022 1/4/2022    Lipid Panel 10/04/2022 10/4/2021    Eye Exam 12/20/2022 12/20/2021    Foot Exam 01/04/2023 1/4/2022    Override on 7/30/2020: Done        No orders of the defined types were placed in this encounter.    The following information is provided to all patients.  This information is to help you find resources for any of the problems found today that may be affecting your health:                Living healthy guide: www.[facility].[place].[facility]      Understanding Diabetes: www.diabetes.org      Eating healthy: www.cdc.gov/healthyweight      [facility] home safety checklist: www.cdc.gov/steadi/patient.html      Agency on Aging: www.goea.[place].[facility]      Alcoholics anonymous (AA): www.aa.org      Physical Activity: www.daniel.nih.gov/fc6voaf      Tobacco use: www.quitwithusla.org     Counseling and Referral of Other Preventative  (Italic type indicates deductible and co-insurance are waived)    Patient Name: Edie Hammond  Today's Date: 3/16/2022    Health Maintenance       Date Due Completion Date    Diabetes Urine Screening Never done ---    TETANUS VACCINE Never done ---    Shingles Vaccine (2 of 3) 02/10/2016 12/16/2015    Hemoglobin A1c 07/04/2022 1/4/2022    Lipid Panel 10/04/2022 10/4/2021    Eye Exam 12/20/2022 12/20/2021    Foot Exam 01/04/2023 1/4/2022    Override on 7/30/2020: Done        No orders of the defined types were placed in this encounter.    The following information is provided to all patients.  This information is to help you find resources for any of the problems found today that may be affecting your health:                 Living healthy guide: www.[facility].[place].[facility]      Understanding Diabetes: www.diabetes.org      Eating healthy: www.cdc.gov/healthyweight      CDC home safety checklist: www.cdc.gov/steadi/patient.html      Agency on Aging: www.goea.[place].[facility]      Alcoholics anonymous (AA): www.aa.org      Physical Activity: www.daniel.nih.gov/dl4pmcd      Tobacco use: www.quitwithusla.org

## 2022-03-16 NOTE — Clinical Note
Your patient was seen today for a HRA visit.  I have included a copy of my visit note, please review the note and feel free to contact me with any questions.  Thank you for allowing me to participate in the care of your patients.  Marie Doan NP

## 2022-03-18 DIAGNOSIS — K21.9 GASTROESOPHAGEAL REFLUX DISEASE, UNSPECIFIED WHETHER ESOPHAGITIS PRESENT: Primary | ICD-10-CM

## 2022-03-18 DIAGNOSIS — R10.13 DYSPEPSIA: ICD-10-CM

## 2022-03-18 RX ORDER — PANTOPRAZOLE SODIUM 40 MG/1
40 TABLET, DELAYED RELEASE ORAL DAILY
Qty: 90 TABLET | Refills: 0 | Status: SHIPPED | OUTPATIENT
Start: 2022-03-18 | End: 2022-06-30

## 2022-04-10 ENCOUNTER — HOSPITAL ENCOUNTER (EMERGENCY)
Facility: HOSPITAL | Age: 87
Discharge: HOME | End: 2022-04-10
Attending: EMERGENCY MEDICINE
Payer: MEDICARE

## 2022-04-10 VITALS
OXYGEN SATURATION: 99 % | HEIGHT: 59 IN | WEIGHT: 163.81 LBS | TEMPERATURE: 98 F | BODY MASS INDEX: 33.02 KG/M2 | DIASTOLIC BLOOD PRESSURE: 89 MMHG | SYSTOLIC BLOOD PRESSURE: 161 MMHG | RESPIRATION RATE: 17 BRPM | HEART RATE: 58 BPM

## 2022-04-10 DIAGNOSIS — R07.9 CHEST PAIN: ICD-10-CM

## 2022-04-10 LAB
ALBUMIN SERPL BCP-MCNC: 3.8 G/DL (ref 3.5–5.2)
ALP SERPL-CCNC: 78 U/L (ref 55–135)
ALT SERPL W/O P-5'-P-CCNC: 12 U/L (ref 10–44)
ANION GAP SERPL CALC-SCNC: 10 MMOL/L (ref 8–16)
AST SERPL-CCNC: 18 U/L (ref 10–40)
BASOPHILS # BLD AUTO: 0.06 K/UL (ref 0–0.2)
BASOPHILS NFR BLD: 0.5 % (ref 0–1.9)
BILIRUB SERPL-MCNC: 0.3 MG/DL (ref 0.1–1)
BUN SERPL-MCNC: 17 MG/DL (ref 10–30)
CALCIUM SERPL-MCNC: 9.8 MG/DL (ref 8.7–10.5)
CHLORIDE SERPL-SCNC: 99 MMOL/L (ref 95–110)
CO2 SERPL-SCNC: 28 MMOL/L (ref 23–29)
CREAT SERPL-MCNC: 0.8 MG/DL (ref 0.5–1.4)
D DIMER PPP IA.FEU-MCNC: 2.22 MG/L FEU
DIFFERENTIAL METHOD: ABNORMAL
EOSINOPHIL # BLD AUTO: 0.2 K/UL (ref 0–0.5)
EOSINOPHIL NFR BLD: 1.4 % (ref 0–8)
ERYTHROCYTE [DISTWIDTH] IN BLOOD BY AUTOMATED COUNT: 14.6 % (ref 11.5–14.5)
EST. GFR  (AFRICAN AMERICAN): >60 ML/MIN/1.73 M^2
EST. GFR  (NON AFRICAN AMERICAN): >60 ML/MIN/1.73 M^2
GLUCOSE SERPL-MCNC: 114 MG/DL (ref 70–110)
HCT VFR BLD AUTO: 37.5 % (ref 37–48.5)
HGB BLD-MCNC: 12.5 G/DL (ref 12–16)
IMM GRANULOCYTES # BLD AUTO: 0.04 K/UL (ref 0–0.04)
IMM GRANULOCYTES NFR BLD AUTO: 0.4 % (ref 0–0.5)
LYMPHOCYTES # BLD AUTO: 1.6 K/UL (ref 1–4.8)
LYMPHOCYTES NFR BLD: 13.7 % (ref 18–48)
MCH RBC QN AUTO: 29.5 PG (ref 27–31)
MCHC RBC AUTO-ENTMCNC: 33.3 G/DL (ref 32–36)
MCV RBC AUTO: 88 FL (ref 82–98)
MONOCYTES # BLD AUTO: 0.9 K/UL (ref 0.3–1)
MONOCYTES NFR BLD: 8.1 % (ref 4–15)
NEUTROPHILS # BLD AUTO: 8.6 K/UL (ref 1.8–7.7)
NEUTROPHILS NFR BLD: 75.9 % (ref 38–73)
NRBC BLD-RTO: 0 /100 WBC
PLATELET # BLD AUTO: 315 K/UL (ref 150–450)
PMV BLD AUTO: 10.9 FL (ref 9.2–12.9)
POTASSIUM SERPL-SCNC: 3.6 MMOL/L (ref 3.5–5.1)
PROT SERPL-MCNC: 7.8 G/DL (ref 6–8.4)
RBC # BLD AUTO: 4.24 M/UL (ref 4–5.4)
SODIUM SERPL-SCNC: 137 MMOL/L (ref 136–145)
TROPONIN I SERPL DL<=0.01 NG/ML-MCNC: 0.01 NG/ML (ref 0–0.03)
TROPONIN I SERPL DL<=0.01 NG/ML-MCNC: <0.006 NG/ML (ref 0–0.03)
WBC # BLD AUTO: 11.29 K/UL (ref 3.9–12.7)

## 2022-04-10 PROCEDURE — 84484 ASSAY OF TROPONIN QUANT: CPT | Performed by: EMERGENCY MEDICINE

## 2022-04-10 PROCEDURE — 25500020 PHARM REV CODE 255: Performed by: EMERGENCY MEDICINE

## 2022-04-10 PROCEDURE — 85379 FIBRIN DEGRADATION QUANT: CPT | Performed by: EMERGENCY MEDICINE

## 2022-04-10 PROCEDURE — 93010 ELECTROCARDIOGRAM REPORT: CPT | Mod: 76,,, | Performed by: INTERNAL MEDICINE

## 2022-04-10 PROCEDURE — 85025 COMPLETE CBC W/AUTO DIFF WBC: CPT | Performed by: EMERGENCY MEDICINE

## 2022-04-10 PROCEDURE — 80053 COMPREHEN METABOLIC PANEL: CPT | Performed by: EMERGENCY MEDICINE

## 2022-04-10 PROCEDURE — 93005 ELECTROCARDIOGRAM TRACING: CPT

## 2022-04-10 PROCEDURE — 99285 EMERGENCY DEPT VISIT HI MDM: CPT | Mod: 25

## 2022-04-10 PROCEDURE — 93010 EKG 12-LEAD: ICD-10-PCS | Mod: ,,, | Performed by: INTERNAL MEDICINE

## 2022-04-10 RX ADMIN — IOHEXOL 100 ML: 350 INJECTION, SOLUTION INTRAVENOUS at 04:04

## 2022-04-10 NOTE — ED PROVIDER NOTES
"SCRIBE #1 NOTE: I, Charu Saldaña, am scribing for, and in the presence of, Leonard Sandhu MD. I have scribed the HPI, ROS, and PEx.     SCRIBE #2 NOTE: I, Opal Guzman, am scribing for, and in the presence of,  Enid Mcpherson MD. I have scribed the remaining portions of the note not scribed by Scribe #1.      History     Chief Complaint   Patient presents with    Chest Pain     Pt CO chest "discomfort" x1 day. Non-radiating. Denies cold or flu like SS.     Review of patient's allergies indicates:   Allergen Reactions    Brimonidine tartrate Itching and Swelling    Losartan Anaphylaxis    Nsaids (non-steroidal anti-inflammatory drug) Swelling    Metformin hcl Diarrhea         History of Present Illness     HPI    4/10/2022, 1:39 AM  History obtained from the patient      History of Present Illness: Edie Hammond is a 89 y.o. female patient with a PMHx of HTN, diabetes, type 2 DM, hypothyroidism, and heart murmur who presents to the Emergency Department for evaluation of pleuritic CP which onset intermittently at 19:00. Pt stated that she feels discomfort and is experiencing back pain. Symptoms are constant and moderate in severity. No mitigating or exacerbating factors reported. Associated sxs include ankle swelling. Patient denies any fever, N/V/D, weakness, numbness, diaphoresis, and all other sxs at this time. Prior Tx includes aspirin and nitroglycerin with relief. No further complaints or concerns at this time.       Arrival mode: EMS    PCP: Erin Gary MD        Past Medical History:  Past Medical History:   Diagnosis Date    Diabetes     Glaucoma     Follows with Dr. James Reeves, ophthalmologist    Hand arthritis     Heart murmur     Follows with Dr. Faisal Milton, cardiology    HTN (hypertension)     Hypothyroidism     Intracranial hemorrhage     Postmenopausal     Seasonal allergies     Type 2 diabetes mellitus        Past Surgical History:  Past Surgical History: "   Procedure Laterality Date    bladder lift      BREAST BIOPSY      BUNIONECTOMY Bilateral     1970s    CATARACT EXTRACTION Bilateral     1999    CHOLECYSTECTOMY      CRANIOTOMY  2017    s/p fall    FOOT NEUROMA SURGERY Right 1982    R foot - neuroma removed    MYOMECTOMY      TOTAL ABDOMINAL HYSTERECTOMY W/ BILATERAL SALPINGOOPHORECTOMY      due to fibroid         Family History:  Family History   Problem Relation Age of Onset    No Known Problems Son     Heart attack Mother     Diabetes Sister     Hypertension Sister     Hypertension Sister     Breast cancer Maternal Aunt     Stroke Neg Hx        Social History:  Social History     Tobacco Use    Smoking status: Never Smoker    Smokeless tobacco: Never Used   Substance and Sexual Activity    Alcohol use: Yes     Comment: red wine rarely    Drug use: Never    Sexual activity: Not on file        Review of Systems     Review of Systems   Constitutional: Negative.    HENT: Negative.    Respiratory: Negative.    Cardiovascular: Positive for chest pain.   Endocrine: Negative.    Genitourinary: Negative.    Musculoskeletal: Positive for back pain.   Skin: Negative.    Neurological: Negative.    Psychiatric/Behavioral: Negative.         Physical Exam     Initial Vitals [04/10/22 0023]   BP Pulse Resp Temp SpO2   (!) 168/68 96 16 98 °F (36.7 °C) 97 %      MAP       --          Physical Exam  Nursing Notes and Vital Signs Reviewed.  Constitutional: Patient is in no acute distress. Well-developed and well-nourished.  Head: Atraumatic. Normocephalic.  Eyes: PERRL. EOM intact. Conjunctivae are not pale. No scleral icterus.  ENT: Mucous membranes are moist. Oropharynx is clear and symmetric.    Neck: Supple. Full ROM. No lymphadenopathy.  Cardiovascular: Regular rate. Regular rhythm. Murmur. Distal pulses are 2+ and symmetric.  Pulmonary/Chest: No respiratory distress. Clear to auscultation bilaterally. No wheezing or rales.  Abdominal: Soft and  "non-distended. There is no tenderness.  No rebound, guarding, or rigidity. Good bowel sounds.  Genitourinary: No CVA tenderness  Musculoskeletal: Moves all extremities. No obvious deformities. No edema. No calf tenderness.  Skin: Warm and dry.  Neurological:  Alert, awake, and appropriate.  Normal speech.  No acute focal neurological deficits are appreciated.  Psychiatric: Normal affect. Good eye contact. Appropriate in content.     ED Course   Procedures  ED Vital Signs:  Vitals:    04/10/22 0023 04/10/22 0052 04/10/22 0053 04/10/22 0400   BP: (!) 168/68 (!) 145/65  (!) 165/95   Pulse: 96 (!) 57 (!) 59 60   Resp: 16 20  (!) 22   Temp: 98 °F (36.7 °C)      TempSrc: Oral      SpO2: 97% 95%  96%   Weight:  74.3 kg (163 lb 12.8 oz)     Height: 4' 11" (1.499 m)       04/10/22 0428 04/10/22 0430 04/10/22 0731   BP: (!) 166/67 (!) 173/76 (!) 161/89   Pulse: (!) 54 (!) 59 (!) 58   Resp: 16 16 17   Temp:   97.8 °F (36.6 °C)   TempSrc:   Oral   SpO2: 96% 95% 99%   Weight:      Height:          Abnormal Lab Results:  Labs Reviewed   CBC W/ AUTO DIFFERENTIAL - Abnormal; Notable for the following components:       Result Value    RDW 14.6 (*)     Gran # (ANC) 8.6 (*)     Gran % 75.9 (*)     Lymph % 13.7 (*)     All other components within normal limits   COMPREHENSIVE METABOLIC PANEL - Abnormal; Notable for the following components:    Glucose 114 (*)     All other components within normal limits   D DIMER, QUANTITATIVE - Abnormal; Notable for the following components:    D-Dimer 2.22 (*)     All other components within normal limits   TROPONIN I   TROPONIN I        All Lab Results:  Results for orders placed or performed during the hospital encounter of 04/10/22   CBC auto differential   Result Value Ref Range    WBC 11.29 3.90 - 12.70 K/uL    RBC 4.24 4.00 - 5.40 M/uL    Hemoglobin 12.5 12.0 - 16.0 g/dL    Hematocrit 37.5 37.0 - 48.5 %    MCV 88 82 - 98 fL    MCH 29.5 27.0 - 31.0 pg    MCHC 33.3 32.0 - 36.0 g/dL    RDW 14.6 " (H) 11.5 - 14.5 %    Platelets 315 150 - 450 K/uL    MPV 10.9 9.2 - 12.9 fL    Immature Granulocytes 0.4 0.0 - 0.5 %    Gran # (ANC) 8.6 (H) 1.8 - 7.7 K/uL    Immature Grans (Abs) 0.04 0.00 - 0.04 K/uL    Lymph # 1.6 1.0 - 4.8 K/uL    Mono # 0.9 0.3 - 1.0 K/uL    Eos # 0.2 0.0 - 0.5 K/uL    Baso # 0.06 0.00 - 0.20 K/uL    nRBC 0 0 /100 WBC    Gran % 75.9 (H) 38.0 - 73.0 %    Lymph % 13.7 (L) 18.0 - 48.0 %    Mono % 8.1 4.0 - 15.0 %    Eosinophil % 1.4 0.0 - 8.0 %    Basophil % 0.5 0.0 - 1.9 %    Differential Method Automated    Comprehensive metabolic panel   Result Value Ref Range    Sodium 137 136 - 145 mmol/L    Potassium 3.6 3.5 - 5.1 mmol/L    Chloride 99 95 - 110 mmol/L    CO2 28 23 - 29 mmol/L    Glucose 114 (H) 70 - 110 mg/dL    BUN 17 10 - 30 mg/dL    Creatinine 0.8 0.5 - 1.4 mg/dL    Calcium 9.8 8.7 - 10.5 mg/dL    Total Protein 7.8 6.0 - 8.4 g/dL    Albumin 3.8 3.5 - 5.2 g/dL    Total Bilirubin 0.3 0.1 - 1.0 mg/dL    Alkaline Phosphatase 78 55 - 135 U/L    AST 18 10 - 40 U/L    ALT 12 10 - 44 U/L    Anion Gap 10 8 - 16 mmol/L    eGFR if African American >60 >60 mL/min/1.73 m^2    eGFR if non African American >60 >60 mL/min/1.73 m^2   Troponin I   Result Value Ref Range    Troponin I 0.007 0.000 - 0.026 ng/mL   D dimer, quantitative   Result Value Ref Range    D-Dimer 2.22 (H) <0.50 mg/L FEU   Troponin I   Result Value Ref Range    Troponin I <0.006 0.000 - 0.026 ng/mL         Imaging Results:  Imaging Results          CTA Chest Non-Coronary (PE Study) (Final result)  Result time 04/10/22 09:37:04    Final result by Rio Blanco MD (04/10/22 09:37:04)                 Impression:      Trace bilateral pleural effusions with mild bibasilar atelectasis.    No CTA evidence of pulmonary thromboembolism.    Additional findings as above.      Electronically signed by: Rio Blanco  Date:    04/10/2022  Time:    09:37             Narrative:    EXAMINATION:  CTA CHEST NON CORONARY    CLINICAL  HISTORY:  Pulmonary embolism (PE) suspected, positive D-dimer;    TECHNIQUE:  The chest was surveyed from the costophrenic angles through the lung apices at 3-mm increments after the administration of 100 cc of Omnipaque 350 intravenous contrast material according to the PE protocol which is optimized for vascular contrast resolution.  Axial, sagittal and coronal maximum intensity projection images were reviewed. All CT scans at this location are performed using dose modulation techniques as appropriate to a performed exam including the following: Automated exposure control; adjustment of the mA and/or kV according to patient size (this includes techniques or standardized protocols for targeted exams where dose is matched to indication/reason for exam; i. e. extremities or head); use of iterative reconstruction technique.    COMPARISON:  Chest radiograph from 04/10/2022.    FINDINGS:  Base of Neck: Unremarkable.    Pulmonary Vasculature: Satisfactory opacification. No filling defect to the segmental level.    Systemic Vasculature: No aneurysm. Mild scattered atherosclerotic calcification.    Heart: Mildly enlarged.  No significant pericardial fluid.  Coronary artery atherosclerotic calcification.    Axillae: No adenopathy.    Mediastinum/Abi: Unremarkable.    Airways: Patent.    Pleura: Trace bilateral pleural fluid.  No pneumothorax.    Lungs: Mild bibasilar atelectasis.  No acute appearing infiltrates.  No pulmonary mass.    Chest Wall: No significant abnormality.    Upper Abdomen: No acute abnormality.    Bones: No acute fracture or suspicious osseous lesion.                               X-Ray Chest AP Portable (Final result)  Result time 04/10/22 08:25:15    Final result by Rio Blanco MD (04/10/22 08:25:15)                 Impression:      No acute process or significant interval change.      Electronically signed by: Rio Blanco  Date:    04/10/2022  Time:    08:25             Narrative:     EXAMINATION:  XR CHEST AP PORTABLE    CLINICAL HISTORY:  chest pain;    TECHNIQUE:  Single frontal view of the chest was performed.    COMPARISON:  Chest radiograph 11/10/2021.    FINDINGS:  Minimal coarse markings in the bilateral lung bases which appear present on 11/10/2021 suggestive of chronic interstitial changes.  No convincing evidence of acute airspace or interstitial disease.  No effusion or pneumothorax is visualized.  Unchanged appearance of the cardiomediastinal silhouette.  No free air under the diaphragm.  No acute osseous abnormality.  Advanced degenerative changes at the bilateral shoulders, possible rotator cuff insufficiency, as well.                                CTA per STATRAD: no PE    The EKG was ordered, reviewed, and independently interpreted by the ED provider.  Interpretation time: 0:39  Rate: 70 BPM  Rhythm: Sinus rhythm with 1st degree A-V block with occasional Premature ventricular complexes  Interpretation: Left axis deviation. Anterior infarct, age undetermined. No STEMI.        The Emergency Provider reviewed the vital signs and test results, which are outlined above.     ED Discussion     6:09 AM: Dr. Sandhu transfers care of patient to Dr. Mcpherson pending lab results.    6:51 AM: Reassessed pt at this time. Discussed with pt all pertinent ED information and results. Discussed pt dx and plan of tx. Gave pt all f/u and return to the ED instructions. All questions and concerns were addressed at this time. Pt expresses understanding of information and instructions, and is comfortable with plan to discharge. Pt is stable for discharge.         Medical Decision Making:   Clinical Tests:   Lab Tests: Ordered and Reviewed  Radiological Study: Ordered and Reviewed  Medical Tests: Ordered and Reviewed           ED Medication(s):  Medications   iohexoL (OMNIPAQUE 350) injection 100 mL (100 mLs Intravenous Given 4/10/22 0459)       Discharge Medication List as of 4/10/2022  6:46 AM            Follow-up Information     Schedule an appointment as soon as possible for a visit  with Faisal Milton MD.    Specialty: Cardiology  Contact information:  [address] [place] [address] [phone]             [facility]    Specialty: Emergency Medicine  Why: As needed, If symptoms worsen  Contact information:  [address]  [facility] [address] [phone]                           Scribe Attestation:   Scribe #1: I performed the above scribed service and the documentation accurately describes the services I performed. I attest to the accuracy of the note.     Attending:   Physician Attestation Statement for Scribe #1: I, Leonard Sandhu MD, personally performed the services described in this documentation, as scribed by Charu Saldaña, in my presence, and it is both accurate and complete.       Scribe Attestation:   Scribe #2: I performed the above scribed service and the documentation accurately describes the services I performed. I attest to the accuracy of the note.    Attending Attestation:           Physician Attestation for Scribe:    Physician Attestation Statement for Scribe #2: I, Enid Mcpherson MD, reviewed documentation, as scribed by Opal Guzman in my presence, and it is both accurate and complete. I also acknowledge and confirm the content of the note done by Scribe #1.           Clinical Impression       ICD-10-CM ICD-9-CM   1. Chest pain  R07.9 786.50       Disposition:   Disposition: Discharged  Condition: Stable         Enid Mcpherson MD  04/10/22 1221       Enid Mcpherson MD  04/21/22 1207

## 2022-04-13 DIAGNOSIS — E03.9 HYPOTHYROIDISM, UNSPECIFIED TYPE: ICD-10-CM

## 2022-04-13 RX ORDER — LEVOTHYROXINE SODIUM 50 UG/1
TABLET ORAL
Qty: 90 TABLET | Refills: 1 | Status: SHIPPED | OUTPATIENT
Start: 2022-04-13 | End: 2022-10-13

## 2022-04-14 NOTE — TELEPHONE ENCOUNTER
Refill Routing Note   Medication(s) are not appropriate for processing by [facility] for the following reason(s):      - Patient has been seen in the ED/Hospital since the last PCP visit    [facility] action(s):  Defer Medication-related problems identified: Requires labs        Medication reconciliation completed: No     Appointments  past 12m or future 3m with PCP    Date Provider   Last Visit   1/4/2022 Erin Gary MD   Next Visit   4/22/2022 Erin Gary MD   ED visits in past 90 days: 1        Note composed:9:02 PM 04/13/2022

## 2022-04-14 NOTE — TELEPHONE ENCOUNTER
Care Due:                  Date            Visit Type   Department     Provider  --------------------------------------------------------------------------------                                EP -                              PRIMARY      [facility] INTERNAL  Last Visit: 01-      CARE ([facility])   BROOKE Gary                              EP -                              PRIMARY      [facility] FAMILY  Next Visit: 04-      CARE ([facility])   BROOKE Gary                                                            Last  Test          Frequency    Reason                     Performed    Due Date  --------------------------------------------------------------------------------    HBA1C.......  6 months...  JANUVIA..................  01- 07-    Powered by [employer]. Reference number: [id number].   4/13/2022 9:00:36 PM CDT

## 2022-04-20 ENCOUNTER — TELEPHONE (OUTPATIENT)
Dept: FAMILY MEDICINE | Facility: CLINIC | Age: 87
End: 2022-04-20
Payer: MEDICARE

## 2022-04-20 ENCOUNTER — NURSE TRIAGE (OUTPATIENT)
Dept: ADMINISTRATIVE | Facility: CLINIC | Age: 87
End: 2022-04-20
Payer: MEDICARE

## 2022-04-20 NOTE — TELEPHONE ENCOUNTER
----- Message from Annalise Esteban sent at 4/20/2022  9:33 AM CDT -----  Contact: Patient  Type:  Same Day Appointment Request    Caller is requesting a same day appointment.  Caller declined first available appointment listed below.    Name of Caller:Edie Almanzar  When is the first available appointment?08/15/2022  Symptoms:lightheaded and dizzy/also states she has a little chest pain  Best Call Back Number:[phone]  Additional Information: patient states she's gone to emergency and wanted to consult with office

## 2022-04-20 NOTE — TELEPHONE ENCOUNTER
Pt called OOC line and reports having severe chest , back pain and dizziness last night.  Pt reports chest and back pain not being as bad right now. Pt states dizziness would get worse when lying down. Pt describes back pain slightly sharp when she takes a breath, localized to lower left lung area.     Pt was advised to go to ED/UCC or PCP office now per triage protocol. Pt verbalized understanding and stated she would rather an appt with PCP.  RN called 's office and spoke with MsMusa Michaela. Ms. Jennings stated there were no openings in schedule.  RN notified pt, pt verbalized understanding and stated she would go to [facility].    Reason for Disposition   Chest pain lasting longer than 5 minutes and occurred in last 3 days (72 hours) (Exception: feels exactly the same as previously diagnosed heartburn and has accompanying sour taste in mouth)    Additional Information   Negative: SEVERE difficulty breathing (e.g., struggling for each breath, speaks in single words)   Negative: Passed out (i.e., fainted, collapsed and was not responding)   Negative: Difficult to awaken or acting confused (e.g., disoriented, slurred speech)   Negative: Shock suspected (e.g., cold/pale/clammy skin, too weak to stand, low BP, rapid pulse)   Negative: Chest pain lasting longer than 5 minutes and ANY of the following:* Over 44 years old* Over 30 years old and at least one cardiac risk factor (e.g., diabetes mellitus, high blood pressure, high cholesterol, smoker, or strong family history of heart disease)* History of heart disease (i.e., angina, heart attack, heart failure, bypass surgery, takes nitroglycerin)* Pain is crushing, pressure-like, or heavy   Negative: Heart beating < 50 beats per minute OR > 140 beats per minute   Negative: Visible sweat on face or sweat dripping down face   Negative: Sounds like a life-threatening emergency to the triager   Negative: SEVERE chest pain   Negative: Pain also in shoulder(s) or arm(s)  or jaw   Negative: Difficulty breathing   Negative: Cocaine use within last 3 days   Negative: Major surgery in the past month   Negative: Hip or leg fracture (broken bone) in past month (or had cast on leg or ankle in past month)   Negative: Illness requiring prolonged bedrest in past month (e.g., immobilization, long hospital stay)   Negative: Long-distance travel in past month (e.g., car, bus, train, plane; with trip lasting 6 or more hours)   Negative: History of prior 'blood clot' in leg or lungs (i.e., deep vein thrombosis, pulmonary embolism)   Negative: History of inherited increased risk of blood clots (e.g., Factor 5 Leiden, Anti-thrombin 3, Protein C or Protein S deficiency, Prothrombin mutation)   Negative: Cancer treatment in the past two months (or has cancer now)   Negative: Heart beating irregularly or very rapidly    Protocols used: CHEST PAIN-A-OH

## 2022-04-22 ENCOUNTER — LAB VISIT (OUTPATIENT)
Dept: LAB | Facility: HOSPITAL | Age: 87
End: 2022-04-22
Attending: FAMILY MEDICINE
Payer: MEDICARE

## 2022-04-22 ENCOUNTER — OFFICE VISIT (OUTPATIENT)
Dept: FAMILY MEDICINE | Facility: CLINIC | Age: 87
End: 2022-04-22
Payer: MEDICARE

## 2022-04-22 VITALS
TEMPERATURE: 98 F | HEART RATE: 67 BPM | HEIGHT: 59 IN | OXYGEN SATURATION: 98 % | DIASTOLIC BLOOD PRESSURE: 46 MMHG | WEIGHT: 143.31 LBS | SYSTOLIC BLOOD PRESSURE: 116 MMHG | BODY MASS INDEX: 28.89 KG/M2

## 2022-04-22 DIAGNOSIS — E11.9 TYPE 2 DIABETES MELLITUS WITHOUT COMPLICATION, WITHOUT LONG-TERM CURRENT USE OF INSULIN: Primary | ICD-10-CM

## 2022-04-22 DIAGNOSIS — H40.1133 PRIMARY OPEN ANGLE GLAUCOMA (POAG) OF BOTH EYES, SEVERE STAGE: ICD-10-CM

## 2022-04-22 DIAGNOSIS — E11.9 TYPE 2 DIABETES MELLITUS WITHOUT COMPLICATION, WITHOUT LONG-TERM CURRENT USE OF INSULIN: ICD-10-CM

## 2022-04-22 DIAGNOSIS — I42.1 OBSTRUCTIVE HYPERTROPHIC CARDIOMYOPATHY: ICD-10-CM

## 2022-04-22 DIAGNOSIS — I15.2 HYPERTENSION ASSOCIATED WITH DIABETES: ICD-10-CM

## 2022-04-22 DIAGNOSIS — E66.3 OVERWEIGHT (BMI 25.0-29.9): ICD-10-CM

## 2022-04-22 DIAGNOSIS — H91.93 BILATERAL HEARING LOSS, UNSPECIFIED HEARING LOSS TYPE: ICD-10-CM

## 2022-04-22 DIAGNOSIS — E03.9 HYPOTHYROIDISM, UNSPECIFIED TYPE: ICD-10-CM

## 2022-04-22 DIAGNOSIS — I35.0 NONRHEUMATIC AORTIC VALVE STENOSIS: ICD-10-CM

## 2022-04-22 DIAGNOSIS — E11.59 HYPERTENSION ASSOCIATED WITH DIABETES: ICD-10-CM

## 2022-04-22 DIAGNOSIS — R42 VERTIGO: ICD-10-CM

## 2022-04-22 LAB
ALBUMIN/CREAT UR: 38.7 UG/MG (ref 0–30)
CREAT UR-MCNC: 31 MG/DL (ref 15–325)
ESTIMATED AVG GLUCOSE: 123 MG/DL (ref 68–131)
HBA1C MFR BLD: 5.9 % (ref 4–5.6)
MICROALBUMIN UR DL<=1MG/L-MCNC: 12 UG/ML
TSH SERPL DL<=0.005 MIU/L-ACNC: 3.67 UIU/ML (ref 0.4–4)

## 2022-04-22 PROCEDURE — 99999 PR PBB SHADOW E&M-EST. PATIENT-LVL V: CPT | Mod: PBBFAC,,, | Performed by: FAMILY MEDICINE

## 2022-04-22 PROCEDURE — 82043 UR ALBUMIN QUANTITATIVE: CPT | Performed by: FAMILY MEDICINE

## 2022-04-22 PROCEDURE — 1159F MED LIST DOCD IN RCRD: CPT | Mod: CPTII,S$GLB,, | Performed by: FAMILY MEDICINE

## 2022-04-22 PROCEDURE — 83036 HEMOGLOBIN GLYCOSYLATED A1C: CPT | Performed by: FAMILY MEDICINE

## 2022-04-22 PROCEDURE — 1160F PR REVIEW ALL MEDS BY PRESCRIBER/CLIN PHARMACIST DOCUMENTED: ICD-10-PCS | Mod: CPTII,S$GLB,, | Performed by: FAMILY MEDICINE

## 2022-04-22 PROCEDURE — 1101F PR PT FALLS ASSESS DOC 0-1 FALLS W/OUT INJ PAST YR: ICD-10-PCS | Mod: CPTII,S$GLB,, | Performed by: FAMILY MEDICINE

## 2022-04-22 PROCEDURE — 1126F AMNT PAIN NOTED NONE PRSNT: CPT | Mod: CPTII,S$GLB,, | Performed by: FAMILY MEDICINE

## 2022-04-22 PROCEDURE — 99214 PR OFFICE/OUTPT VISIT, EST, LEVL IV, 30-39 MIN: ICD-10-PCS | Mod: S$GLB,,, | Performed by: FAMILY MEDICINE

## 2022-04-22 PROCEDURE — 36415 COLL VENOUS BLD VENIPUNCTURE: CPT | Mod: PO | Performed by: FAMILY MEDICINE

## 2022-04-22 PROCEDURE — 99499 RISK ADDL DX/OHS AUDIT: ICD-10-PCS | Mod: S$GLB,,, | Performed by: FAMILY MEDICINE

## 2022-04-22 PROCEDURE — 82570 ASSAY OF URINE CREATININE: CPT | Performed by: FAMILY MEDICINE

## 2022-04-22 PROCEDURE — 3288F PR FALLS RISK ASSESSMENT DOCUMENTED: ICD-10-PCS | Mod: CPTII,S$GLB,, | Performed by: FAMILY MEDICINE

## 2022-04-22 PROCEDURE — 1126F PR PAIN SEVERITY QUANTIFIED, NO PAIN PRESENT: ICD-10-PCS | Mod: CPTII,S$GLB,, | Performed by: FAMILY MEDICINE

## 2022-04-22 PROCEDURE — 1160F RVW MEDS BY RX/DR IN RCRD: CPT | Mod: CPTII,S$GLB,, | Performed by: FAMILY MEDICINE

## 2022-04-22 PROCEDURE — 3288F FALL RISK ASSESSMENT DOCD: CPT | Mod: CPTII,S$GLB,, | Performed by: FAMILY MEDICINE

## 2022-04-22 PROCEDURE — 1101F PT FALLS ASSESS-DOCD LE1/YR: CPT | Mod: CPTII,S$GLB,, | Performed by: FAMILY MEDICINE

## 2022-04-22 PROCEDURE — 84443 ASSAY THYROID STIM HORMONE: CPT | Performed by: FAMILY MEDICINE

## 2022-04-22 PROCEDURE — 1159F PR MEDICATION LIST DOCUMENTED IN MEDICAL RECORD: ICD-10-PCS | Mod: CPTII,S$GLB,, | Performed by: FAMILY MEDICINE

## 2022-04-22 PROCEDURE — 99999 PR PBB SHADOW E&M-EST. PATIENT-LVL V: ICD-10-PCS | Mod: PBBFAC,,, | Performed by: FAMILY MEDICINE

## 2022-04-22 PROCEDURE — 99499 UNLISTED E&M SERVICE: CPT | Mod: S$GLB,,, | Performed by: FAMILY MEDICINE

## 2022-04-22 PROCEDURE — 99214 OFFICE O/P EST MOD 30 MIN: CPT | Mod: S$GLB,,, | Performed by: FAMILY MEDICINE

## 2022-04-22 RX ORDER — MECLIZINE HCL 12.5 MG 12.5 MG/1
12.5 TABLET ORAL 3 TIMES DAILY PRN
Qty: 30 TABLET | Refills: 0 | Status: SHIPPED | OUTPATIENT
Start: 2022-04-22 | End: 2023-01-17

## 2022-04-22 NOTE — PROGRESS NOTES
Edie Hammond    Chief Complaint   Patient presents with    Follow-up    Hypertension    Diabetes       History of Present Illness:   Edie Hammond is a 89 y.o. female who presents today for hypertension and diabetes follow-up. She is not fasting with taking medication today. She states she does not exercise but monitors her diet. She states she performs home glucose checks with morning levels ranging . She states she performs home blood pressure checks 1-2 times a day with systolic levels ranging 119-130.    She states she was evaluated at [facility] on April 20, 2022 for chest pain and dizziness. She state she ER physical spoke with her cardiologist Dr. Milton and advised her to call him for follow up appointment.  She states she plans to call for appointment.    She reports having vertigo and states she only feels dizziness when lying down and when standing up from laying down. She reports vertigo goes away after a week. She requests medication for treatment of vertigo which she states she took in the past with help.    She reports chronic fatigue and chronic, intermittent lower back aches.    She reports having somnolence and states Dr. Milton told her she should speak with her me about her thyroid.    Otherwise, she denies having fever, chills, fatigue, appetite changes; shortness of breath, cough, wheezing; chest pain, palpitations,  leg swelling; other sinus symptom; abdominal pain, nausea, vomiting, diarrhea, constipation; unusual urinary symptoms; polydipsia, polyphagia, polyuria, hot or cold intolerance; headache, numbness, acute visual changes; anxiety, depression, homicidal or suicidal thoughts.     She saw Dr. Faisal Milton, cardiology, on March 7, 2022 for Hypertensive heart disease without heart failure, Non rheumatic aortic (valve) stenosis, Cardiac murmur, Obstructive hypertrophic cardiomyopathy, and Nontraumatic chronic subdural hemorrhage.    She saw Dr. James Reeves,  Ophthalmology, on December 20, 2021 for bilateral primary open-angle glaucoma (severe stage).    Labs:                  WBC                      11.29               04/10/2022                 HGB                      12.5                04/10/2022                 HCT                      37.5                04/10/2022                 PLT                      315                 04/10/2022                 CHOL                     180                 10/04/2021                 TRIG                     108                 10/04/2021                 HDL                      66                  10/04/2021                 ALT                      12                  04/10/2022                 AST                      18                  04/10/2022                 NA                       137                 04/10/2022                 K                        3.6                 04/10/2022                 CL                       99                  04/10/2022                 CREATININE               0.8                 04/10/2022                 BUN                      17                  04/10/2022                 CO2                      28                  04/10/2022                 TSH                      2.679               10/04/2021                 HGBA1C                   6.0 (H)             01/04/2022            LDLCALC                  92.4                10/04/2021                Current Outpatient Medications   Medication Sig    amLODIPine (NORVASC) 5 MG tablet Take 5 mg by mouth once daily.     blood sugar diagnostic (BLOOD GLUCOSE TEST) Strp Use twice daily prn. Dx: E11.9    bumetanide (BUMEX) 1 MG tablet But also states some times takes 3 times per week    cetirizine (ZYRTEC) 10 MG tablet Take 10 mg by mouth once daily.     cloNIDine (CATAPRES) 0.1 MG tablet Take 0.1 mg by mouth daily as needed (takes only if SBP > 180 per cardiologist).     dorzolamide-timolol 2-0.5% (COSOPT) 22.3-6.8 mg/mL  ophthalmic solution 1 drop.    doxazosin (CARDURA) 1 MG tablet Take 1 mg by mouth nightly.    hydrALAZINE (APRESOLINE) 100 MG tablet TAKE ONE TABLET BY MOUTH THREE TIMES DAILY    JANUVIA 100 mg Tab TAKE ONE TABLET BY MOUTH ONE TIME DAILY    lancets Misc Use twice daily prn. Dx: E11.9    latanoprost 0.005 % ophthalmic solution SMARTSI Drop(s) In Eye(s) Every Evening    levothyroxine (SYNTHROID) 50 MCG tablet TAKE ONE TABLET BY MOUTH ONCE BEFORE BREAKFAST DAILY    montelukast (SINGULAIR) 10 mg tablet TAKE ONE TABLET BY MOUTH IN THE EVENING    multivitamin with minerals tablet Take 1 tablet by mouth once daily.     nystatin (MYCOSTATIN) cream APPLY TOPICALLY TWICE DAILY AS NEEDED FOR IRRITATED  SKIN    blood-glucose meter kit Use as instructed - dx: E11.9    EPINEPHrine (EPIPEN) 0.3 mg/0.3 mL AtIn Inject 0.3 mLs (0.3 mg total) into the muscle once. for 1 dose (Patient not taking: No sig reported)    pantoprazole (PROTONIX) 40 MG tablet Take 1 tablet (40 mg total) by mouth once daily. (Patient not taking: Reported on 2022)       Review of Systems   Constitutional: Positive for activity change and fatigue. Negative for appetite change, chills and fever.        Weight  68.1 kg (150 lb 2.1 oz) at 2022 visit.   HENT: Negative for congestion.    Eyes: Negative for visual disturbance.        See history of present illness.   Respiratory: Negative for cough, shortness of breath and wheezing.    Cardiovascular: Negative for chest pain, palpitations and leg swelling.        See history of present illness.   Gastrointestinal: Negative for abdominal pain, constipation, diarrhea, nausea and vomiting.   Endocrine: Negative for cold intolerance, heat intolerance, polydipsia, polyphagia and polyuria.        See history of present illness.   Genitourinary: Negative for difficulty urinating.   Musculoskeletal: Positive for arthralgias and back pain.        See history of present illness.     Neurological:  Positive for dizziness. Negative for weakness and headaches.   Psychiatric/Behavioral: Positive for sleep disturbance. Negative for confusion, dysphoric mood and suicidal ideas. The patient is not nervous/anxious.         Negative for homicidal ideas. See history of present illness.       Objective:  Physical Exam  Vitals and nursing note reviewed.   Constitutional:       General: She is not in acute distress.     Appearance: Normal appearance. She is well-developed. She is not ill-appearing, toxic-appearing or diaphoretic.   Cardiovascular:      Rate and Rhythm: Normal rate and regular rhythm.      Pulses: Normal pulses.      Heart sounds: Murmur heard.     No gallop.   Pulmonary:      Effort: Pulmonary effort is normal.      Breath sounds: Normal breath sounds.   Abdominal:      General: Bowel sounds are normal. There is no distension.      Palpations: Abdomen is soft. There is no mass.      Tenderness: There is no abdominal tenderness. There is no guarding or rebound.   Musculoskeletal:         General: No swelling or tenderness. Normal range of motion.      Cervical back: Normal range of motion and neck supple. No tenderness.      Comments: She is ambulatory with assistance of cane.   Lymphadenopathy:      Cervical: No cervical adenopathy.   Skin:     General: Skin is warm and dry.   Neurological:      Mental Status: She is alert.      Motor: No abnormal muscle tone.      Comments: No dizziness with movement noted today.   Psychiatric:         Mood and Affect: Mood normal.         Speech: Speech normal.         Behavior: Behavior normal.         Thought Content: Thought content normal.         Judgment: Judgment normal.         ASSESSMENT:  1. Type 2 diabetes mellitus without complication, without long-term current use of insulin    2. Hypertension associated with diabetes    3. Obstructive hypertrophic cardiomyopathy    4. Hypothyroidism, unspecified type    5. Primary open angle glaucoma (POAG) of both eyes,  severe stage    6. Nonrheumatic aortic valve stenosis    7. Vertigo    8. Bilateral hearing loss, unspecified hearing loss type    9. Overweight (BMI 25.0-29.9)        PLAN:  Edie was seen today for follow-up, hypertension and diabetes.    Diagnoses and all orders for this visit:    Type 2 diabetes mellitus without complication, without long-term current use of insulin  -     Hemoglobin A1C; Future  -     Microalbumin/Creatinine Ratio, Urine    Hypertension associated with diabetes    Obstructive hypertrophic cardiomyopathy    Hypothyroidism, unspecified type  -     TSH; Future    Primary open angle glaucoma (POAG) of both eyes, severe stage    Nonrheumatic aortic valve stenosis    Vertigo  -     meclizine (ANTIVERT) 12.5 mg tablet; Take 1 tablet (12.5 mg total) by mouth 3 (three) times daily as needed for Dizziness.    Bilateral hearing loss, unspecified hearing loss type    Overweight (BMI 25.0-29.9)       Patient advised to call for results.  Continue current medications, follow low sodium, low cholesterol, low carb diet, daily walks.  Add Meclizine 12.5 mg every 8 hours prn vertigo; medication precautions discussed with patient.  Keep follow up with specialists.  Flu shot this fall.  Follow up in about 3 months (around 7/22/2022) for hypertension and diabetes follow up.  But, see me sooner if no improvement or worsening symptoms noted.    Scribe Attestation:   I, Alvaro Pastrana, am scribing for, and in the presence of, Dr. Gary. I performed the above scribed service and the documentation accurately describes the services I performed. I attest to the accuracy of the note.    I, Dr. Gary, reviewed documentation as scribed above. I personally performed the services described in this documentation.  I agree that the record reflects my personal performance and is accurate and complete. . 04/22/2022

## 2022-06-28 ENCOUNTER — LAB VISIT (OUTPATIENT)
Dept: LAB | Facility: HOSPITAL | Age: 87
End: 2022-06-28
Attending: FAMILY MEDICINE
Payer: MEDICARE

## 2022-06-28 ENCOUNTER — OFFICE VISIT (OUTPATIENT)
Dept: ALLERGY | Facility: CLINIC | Age: 87
End: 2022-06-28
Payer: MEDICARE

## 2022-06-28 VITALS
TEMPERATURE: 98 F | HEART RATE: 64 BPM | BODY MASS INDEX: 28.05 KG/M2 | SYSTOLIC BLOOD PRESSURE: 127 MMHG | DIASTOLIC BLOOD PRESSURE: 68 MMHG | WEIGHT: 138.88 LBS

## 2022-06-28 DIAGNOSIS — J30.89 SEASONAL ALLERGIC RHINITIS DUE TO OTHER ALLERGIC TRIGGER: ICD-10-CM

## 2022-06-28 DIAGNOSIS — R22.0 LIP SWELLING: ICD-10-CM

## 2022-06-28 DIAGNOSIS — R05.9 COUGH: ICD-10-CM

## 2022-06-28 DIAGNOSIS — T78.3XXD ANGIOEDEMA, SUBSEQUENT ENCOUNTER: Primary | ICD-10-CM

## 2022-06-28 LAB
ALBUMIN SERPL BCP-MCNC: 3.9 G/DL (ref 3.5–5.2)
ALP SERPL-CCNC: 71 U/L (ref 55–135)
ALT SERPL W/O P-5'-P-CCNC: 8 U/L (ref 10–44)
ANION GAP SERPL CALC-SCNC: 14 MMOL/L (ref 8–16)
AST SERPL-CCNC: 15 U/L (ref 10–40)
BASOPHILS # BLD AUTO: 0.1 K/UL (ref 0–0.2)
BASOPHILS NFR BLD: 1.1 % (ref 0–1.9)
BILIRUB SERPL-MCNC: 0.4 MG/DL (ref 0.1–1)
BUN SERPL-MCNC: 18 MG/DL (ref 10–30)
C3 SERPL-MCNC: 92 MG/DL (ref 50–180)
C4 SERPL-MCNC: 27 MG/DL (ref 11–44)
CALCIUM SERPL-MCNC: 9.8 MG/DL (ref 8.7–10.5)
CHLORIDE SERPL-SCNC: 98 MMOL/L (ref 95–110)
CO2 SERPL-SCNC: 25 MMOL/L (ref 23–29)
CREAT SERPL-MCNC: 0.7 MG/DL (ref 0.5–1.4)
DIFFERENTIAL METHOD: ABNORMAL
EOSINOPHIL # BLD AUTO: 0.3 K/UL (ref 0–0.5)
EOSINOPHIL NFR BLD: 2.9 % (ref 0–8)
ERYTHROCYTE [DISTWIDTH] IN BLOOD BY AUTOMATED COUNT: 14.5 % (ref 11.5–14.5)
EST. GFR  (AFRICAN AMERICAN): >60 ML/MIN/1.73 M^2
EST. GFR  (NON AFRICAN AMERICAN): >60 ML/MIN/1.73 M^2
GLUCOSE SERPL-MCNC: 87 MG/DL (ref 70–110)
HCT VFR BLD AUTO: 38 % (ref 37–48.5)
HGB BLD-MCNC: 12 G/DL (ref 12–16)
IMM GRANULOCYTES # BLD AUTO: 0.04 K/UL (ref 0–0.04)
IMM GRANULOCYTES NFR BLD AUTO: 0.4 % (ref 0–0.5)
LYMPHOCYTES # BLD AUTO: 1.5 K/UL (ref 1–4.8)
LYMPHOCYTES NFR BLD: 16.1 % (ref 18–48)
MCH RBC QN AUTO: 29.1 PG (ref 27–31)
MCHC RBC AUTO-ENTMCNC: 31.6 G/DL (ref 32–36)
MCV RBC AUTO: 92 FL (ref 82–98)
MONOCYTES # BLD AUTO: 0.9 K/UL (ref 0.3–1)
MONOCYTES NFR BLD: 9.7 % (ref 4–15)
NEUTROPHILS # BLD AUTO: 6.5 K/UL (ref 1.8–7.7)
NEUTROPHILS NFR BLD: 69.8 % (ref 38–73)
NRBC BLD-RTO: 0 /100 WBC
PLATELET # BLD AUTO: 375 K/UL (ref 150–450)
PMV BLD AUTO: 10.5 FL (ref 9.2–12.9)
POTASSIUM SERPL-SCNC: 4.3 MMOL/L (ref 3.5–5.1)
PROT SERPL-MCNC: 7.5 G/DL (ref 6–8.4)
RBC # BLD AUTO: 4.12 M/UL (ref 4–5.4)
SODIUM SERPL-SCNC: 137 MMOL/L (ref 136–145)
WBC # BLD AUTO: 9.25 K/UL (ref 3.9–12.7)

## 2022-06-28 PROCEDURE — 1126F AMNT PAIN NOTED NONE PRSNT: CPT | Mod: CPTII,S$GLB,, | Performed by: ALLERGY & IMMUNOLOGY

## 2022-06-28 PROCEDURE — 80053 COMPREHEN METABOLIC PANEL: CPT | Performed by: ALLERGY & IMMUNOLOGY

## 2022-06-28 PROCEDURE — 1101F PT FALLS ASSESS-DOCD LE1/YR: CPT | Mod: CPTII,S$GLB,, | Performed by: ALLERGY & IMMUNOLOGY

## 2022-06-28 PROCEDURE — 84165 PROTEIN E-PHORESIS SERUM: CPT | Performed by: ALLERGY & IMMUNOLOGY

## 2022-06-28 PROCEDURE — 86160 COMPLEMENT ANTIGEN: CPT | Mod: 59 | Performed by: ALLERGY & IMMUNOLOGY

## 2022-06-28 PROCEDURE — 86332 IMMUNE COMPLEX ASSAY: CPT | Performed by: ALLERGY & IMMUNOLOGY

## 2022-06-28 PROCEDURE — 85280 CLOT FACTOR XII HAGEMAN: CPT | Performed by: ALLERGY & IMMUNOLOGY

## 2022-06-28 PROCEDURE — 99214 OFFICE O/P EST MOD 30 MIN: CPT | Mod: S$GLB,,, | Performed by: ALLERGY & IMMUNOLOGY

## 2022-06-28 PROCEDURE — 86003 ALLG SPEC IGE CRUDE XTRC EA: CPT | Mod: 59 | Performed by: ALLERGY & IMMUNOLOGY

## 2022-06-28 PROCEDURE — 3288F FALL RISK ASSESSMENT DOCD: CPT | Mod: CPTII,S$GLB,, | Performed by: ALLERGY & IMMUNOLOGY

## 2022-06-28 PROCEDURE — 84165 PROTEIN E-PHORESIS SERUM: CPT | Mod: 26,,, | Performed by: PATHOLOGY

## 2022-06-28 PROCEDURE — 99999 PR PBB SHADOW E&M-EST. PATIENT-LVL IV: ICD-10-PCS | Mod: PBBFAC,,, | Performed by: ALLERGY & IMMUNOLOGY

## 2022-06-28 PROCEDURE — 85025 COMPLETE CBC W/AUTO DIFF WBC: CPT | Performed by: ALLERGY & IMMUNOLOGY

## 2022-06-28 PROCEDURE — 3288F PR FALLS RISK ASSESSMENT DOCUMENTED: ICD-10-PCS | Mod: CPTII,S$GLB,, | Performed by: ALLERGY & IMMUNOLOGY

## 2022-06-28 PROCEDURE — 1159F PR MEDICATION LIST DOCUMENTED IN MEDICAL RECORD: ICD-10-PCS | Mod: CPTII,S$GLB,, | Performed by: ALLERGY & IMMUNOLOGY

## 2022-06-28 PROCEDURE — 36415 COLL VENOUS BLD VENIPUNCTURE: CPT | Performed by: ALLERGY & IMMUNOLOGY

## 2022-06-28 PROCEDURE — 1126F PR PAIN SEVERITY QUANTIFIED, NO PAIN PRESENT: ICD-10-PCS | Mod: CPTII,S$GLB,, | Performed by: ALLERGY & IMMUNOLOGY

## 2022-06-28 PROCEDURE — 1159F MED LIST DOCD IN RCRD: CPT | Mod: CPTII,S$GLB,, | Performed by: ALLERGY & IMMUNOLOGY

## 2022-06-28 PROCEDURE — 86003 ALLG SPEC IGE CRUDE XTRC EA: CPT | Performed by: ALLERGY & IMMUNOLOGY

## 2022-06-28 PROCEDURE — 86161 COMPLEMENT/FUNCTION ACTIVITY: CPT | Performed by: ALLERGY & IMMUNOLOGY

## 2022-06-28 PROCEDURE — 99214 PR OFFICE/OUTPT VISIT, EST, LEVL IV, 30-39 MIN: ICD-10-PCS | Mod: S$GLB,,, | Performed by: ALLERGY & IMMUNOLOGY

## 2022-06-28 PROCEDURE — 99999 PR PBB SHADOW E&M-EST. PATIENT-LVL IV: CPT | Mod: PBBFAC,,, | Performed by: ALLERGY & IMMUNOLOGY

## 2022-06-28 PROCEDURE — 84165 PATHOLOGIST INTERPRETATION SPE: ICD-10-PCS | Mod: 26,,, | Performed by: PATHOLOGY

## 2022-06-28 PROCEDURE — 1101F PR PT FALLS ASSESS DOC 0-1 FALLS W/OUT INJ PAST YR: ICD-10-PCS | Mod: CPTII,S$GLB,, | Performed by: ALLERGY & IMMUNOLOGY

## 2022-06-28 PROCEDURE — 86160 COMPLEMENT ANTIGEN: CPT | Performed by: ALLERGY & IMMUNOLOGY

## 2022-06-28 NOTE — PROGRESS NOTES
Subjective:       Patient ID: Edie Hammond is a 89 y.o. female.      Chief Complaint:  Follow-up      HPI 6/7/2022: 89 year old female with a history of tongue swelling 5/5/2021. She reports a history of a sore throat. She took an Alleve and chamomile tea an hour prior to the onset of the tongue swelling. She denies inability to breath. She reports that she had neck swelling as well. She did not go to the ER. She saw her PA the next day, who prescribed prednisone. She reports that symptoms began to resolve the following day- 5/6/2021. She reports taking Alleve once, since the event without symptoms.  She does not have an Epipen.      HPI today: 89 year old female here for follow up. She denies anaphylaxis or tongue swelling, since she was seen here previously. Last week- lower lip swelled. Swelling lasted 18 hours. Occurred when she was not taking montelukast for a week. She is taking Cetirizine.   Intermittent- every other month/every 6 weeks- sneezing and coughing; no itchy eyes      Past Medical History:   Diagnosis Date    Diabetes     Glaucoma     Follows with Dr. James Reeves, ophthalmologist    Hand arthritis     Heart murmur     Follows with Dr. Faisal Milton, cardiology    HTN (hypertension)     Hypothyroidism     Intracranial hemorrhage     Postmenopausal     Seasonal allergies     Type 2 diabetes mellitus      Family History   Problem Relation Age of Onset    No Known Problems Son     Heart attack Mother     Diabetes Sister     Hypertension Sister     Hypertension Sister     Breast cancer Maternal Aunt     Stroke Neg Hx      Current Outpatient Medications on File Prior to Visit   Medication Sig Dispense Refill    amLODIPine (NORVASC) 5 MG tablet Take 5 mg by mouth once daily.       blood sugar diagnostic (BLOOD GLUCOSE TEST) Strp Use twice daily prn. Dx: E11.9 300 each 3    bumetanide (BUMEX) 1 MG tablet But also states some times takes 3 times per week      cetirizine  (ZYRTEC) 10 MG tablet Take 10 mg by mouth once daily.       cloNIDine (CATAPRES) 0.1 MG tablet Take 0.1 mg by mouth daily as needed (takes only if SBP > 180 per cardiologist).       dorzolamide-timolol 2-0.5% (COSOPT) 22.3-6.8 mg/mL ophthalmic solution 1 drop.      doxazosin (CARDURA) 1 MG tablet Take 1 mg by mouth nightly.      EPINEPHrine (EPIPEN) 0.3 mg/0.3 mL AtIn INJECT 0.3MLS INTO THE MUSCLE ONCE FOR 1 DOSE 2 each 0    hydrALAZINE (APRESOLINE) 100 MG tablet TAKE ONE TABLET BY MOUTH THREE TIMES DAILY 270 tablet 1    JANUVIA 100 mg Tab TAKE ONE TABLET BY MOUTH ONE TIME DAILY 90 tablet 1    lancets Misc Use twice daily prn. Dx: E11.9 300 each 3    latanoprost 0.005 % ophthalmic solution SMARTSI Drop(s) In Eye(s) Every Evening      levothyroxine (SYNTHROID) 50 MCG tablet TAKE ONE TABLET BY MOUTH ONCE BEFORE BREAKFAST DAILY 90 tablet 1    meclizine (ANTIVERT) 12.5 mg tablet Take 1 tablet (12.5 mg total) by mouth 3 (three) times daily as needed for Dizziness. 30 tablet 0    montelukast (SINGULAIR) 10 mg tablet TAKE ONE TABLET BY MOUTH IN THE EVENING 30 tablet 0    multivitamin with minerals tablet Take 1 tablet by mouth once daily.       blood-glucose meter kit Use as instructed - dx: E11.9 1 each 0    nystatin (MYCOSTATIN) cream APPLY TOPICALLY TWICE DAILY AS NEEDED FOR IRRITATED  SKIN (Patient not taking: Reported on 2022) 30 g 3    pantoprazole (PROTONIX) 40 MG tablet Take 1 tablet (40 mg total) by mouth once daily. (Patient not taking: Reported on 2022) 90 tablet 0     No current facility-administered medications on file prior to visit.     Review of patient's allergies indicates:   Allergen Reactions    Brimonidine tartrate Itching and Swelling    Losartan Anaphylaxis    Nsaids (non-steroidal anti-inflammatory drug) Swelling    Metformin hcl Diarrhea       Environmental History: no pets She does not smoke. 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  Review of Systems   Constitutional: Negative for chills and fever.   HENT: Negative for congestion and rhinorrhea.    Eyes: Negative for discharge and itching.   Respiratory: Negative for cough, shortness of breath and wheezing.    Cardiovascular: Negative for chest pain and leg swelling.   Gastrointestinal: Negative for nausea and vomiting.   Endocrine: Negative for cold intolerance and heat intolerance.   Skin: Negative for rash and wound.   Allergic/Immunologic: Positive for environmental allergies. Negative for food allergies.   Neurological: Negative for facial asymmetry and speech difficulty.   Hematological: Negative for adenopathy. Does not bruise/bleed easily.   Psychiatric/Behavioral: Negative for behavioral problems and suicidal ideas.        Objective:    Physical Exam  Vitals reviewed.   Constitutional:       General: She is not in acute distress.     Appearance: Normal appearance. She is well-developed. She is not ill-appearing, toxic-appearing or diaphoretic.   HENT:      Head: Normocephalic and atraumatic.      Right Ear: Tympanic membrane, ear canal and external ear normal. There is no impacted cerumen.      Left Ear: Tympanic membrane, ear canal and external ear normal. There is no impacted cerumen.      Nose: Nose normal. No congestion or rhinorrhea.      Mouth/Throat:      Pharynx: No oropharyngeal exudate or posterior oropharyngeal erythema.   Eyes:      General: No scleral icterus.        Right eye: No discharge.         Left eye: No discharge.      Pupils: Pupils are equal, round, and reactive to light.   Neck:      Thyroid: No thyromegaly.   Cardiovascular:      Rate and Rhythm: Normal rate and regular rhythm.      Heart sounds: Normal heart sounds. No murmur heard.    No friction rub. No gallop.   Pulmonary:      Effort: Pulmonary effort is normal. No respiratory distress.      Breath sounds: Normal breath sounds. No stridor. No wheezing, rhonchi or rales.   Chest:       Chest wall: No tenderness.   Musculoskeletal:      Cervical back: Normal range of motion and neck supple. No rigidity. No muscular tenderness.      Left lower leg: No edema.   Lymphadenopathy:      Cervical: No cervical adenopathy.   Skin:     General: Skin is warm.      Coloration: Skin is not jaundiced or pale.      Findings: No bruising or erythema.   Neurological:      Mental Status: She is alert and oriented to person, place, and time.   Psychiatric:         Mood and Affect: Mood normal.         Behavior: Behavior normal.         Thought Content: Thought content normal.         Judgment: Judgment normal.           Assessment:       1. Angioedema, subsequent encounter    2. Lip swelling    3. Cough    4. Seasonal allergic rhinitis due to other allergic trigger         Plan:       Recommend she avoid Losartan and NSAIDs.   Continue Singulair and Cetirizine    Angioedema, subsequent encounter    Lip swelling  -     C1 Esterase Inhibitor Panel; Future; Expected date: 06/28/2022  -     C1 Esterase Inhibitor, Functional; Future; Expected date: 06/28/2022  -     C1Q Binding Assay; Future; Expected date: 06/28/2022  -     CBC Auto Differential; Future; Expected date: 06/28/2022  -     Comprehensive Metabolic Panel; Future; Expected date: 06/28/2022  -     C3 Complement; Future; Expected date: 06/28/2022  -     C4 Complement; Future; Expected date: 06/28/2022  -     Protein Electrophoresis, Serum; Future; Expected date: 06/28/2022  -     FACTOR 12 ASSAY; Future; Expected date: 06/28/2022    Cough    Seasonal allergic rhinitis due to other allergic trigger  -     [place]ia grass IgE; Future; Expected date: 06/28/2022  -     Aspergillus fumagatus IgE; Future; Expected date: 06/28/2022  -     Chaetomium globosum IgE; Future; Expected date: 06/28/2022  -     Cockroach, American IgE; Future; Expected date: 06/28/2022  -     Cladosporium IgE; Future; Expected date: 06/28/2022  -     Curvularia lunata IgE; Future; Expected date:  06/28/2022  -     D. farinae IgE; Future; Expected date: 06/28/2022  -     D. pteronyssinus IgE; Future; Expected date: 06/28/2022  -     Dog dander IgE; Future; Expected date: 06/28/2022  -     Plantain, English IgE; Future; Expected date: 06/28/2022  -     Eucalyptus IgE; Future; Expected date: 06/28/2022  -     Sears elder, rough IgE; Future; Expected date: 06/28/2022  -     Mugwort IgE; Future; Expected date: 06/28/2022  -     Nettle IgE; Future; Expected date: 06/28/2022  -     Orchard grass IgE; Future; Expected date: 06/28/2022  -     [place], western white IgE; Future; Expected date: 06/28/2022  -     Privet, common IgE; Future; Expected date: 06/28/2022  -     Ragweed, short, common IgE; Future; Expected date: 06/28/2022  -     Red top grass IgE; Future; Expected date: 06/28/2022  -     Rye grass, cultivated IgE; Future; Expected date: 06/28/2022  -     Thistle, Russian IgE; Future; Expected date: 06/28/2022  -     Stemphyllium IgE; Future; Expected date: 06/28/2022  -     Sergio IgE; Future; Expected date: 06/28/2022  -     Gato grass IgE; Future; Expected date: 06/28/2022  -     Allergen, Pecan Tree IgE; Future; Expected date: 06/28/2022  -     [place], black IgE; Future; Expected date: 06/28/2022  -     [place], bald IgE; Future; Expected date: 06/28/2022  -     Oak, white IgE; Future; Expected date: 06/28/2022  -     Allergen, Cocklebur; Future; Expected date: 06/28/2022  -     Cat epithelium IgE; Future; Expected date: 06/28/2022  -     Allergen, Hackberry Celtis; Future; Expected date: 06/28/2022  -     Allergen, Elm Cedar; Future; Expected date: 06/28/2022  -     Allergen-[place]; Future; Expected date: 06/28/2022  -     RAST Allergen for [place]; Future; Expected date: 06/28/2022  -     RAST Allergen Maple ([place]); Future; Expected date: 06/28/2022  -     Allergen, Meadow Grass ([place] Blue); Future; Expected date: 06/28/2022  -     Allergen-Silver Birch; Future; Expected date:  06/28/2022  -     RAST Allergen [place]; Future; Expected date: 06/28/2022  -     RAST Allergen, Sheep [place]); Future; Expected date: 06/28/2022  -     Allergen-Alternaria Alternata; Future; Expected date: 06/28/2022  -     Allergen-Maple [place]; Future; Expected date: 06/28/2022  -     Allergen, White [place]; Future; Expected date: 06/28/2022  -     C1 Esterase Inhibitor Panel; Future; Expected date: 06/28/2022  -     C1 Esterase Inhibitor, Functional; Future; Expected date: 06/28/2022  -     C1Q Binding Assay; Future; Expected date: 06/28/2022  -     CBC Auto Differential; Future; Expected date: 06/28/2022  -     Comprehensive Metabolic Panel; Future; Expected date: 06/28/2022  -     C3 Complement; Future; Expected date: 06/28/2022  -     C4 Complement; Future; Expected date: 06/28/2022  -     Protein Electrophoresis, Serum; Future; Expected date: 06/28/2022    RTC4-  6 weeks or sooner, if needed.    CORNEL GAN spent a total of 30 minutes on the day of the visit.  This includes face to face time and non-face to face time preparing to see the patient (eg, review of tests), obtaining and/or reviewing separately obtained history, documenting clinical information in the electronic or other health record, independently interpreting results and communicating results to the patient/family/caregiver, or care coordinator.

## 2022-06-29 LAB
ALBUMIN SERPL ELPH-MCNC: 3.71 G/DL (ref 3.35–5.55)
ALPHA1 GLOB SERPL ELPH-MCNC: 0.44 G/DL (ref 0.17–0.41)
ALPHA2 GLOB SERPL ELPH-MCNC: 1.01 G/DL (ref 0.43–0.99)
B-GLOBULIN SERPL ELPH-MCNC: 0.85 G/DL (ref 0.5–1.1)
FACT XII ACT/NOR PPP: 41 % (ref 30–130)
GAMMA GLOB SERPL ELPH-MCNC: 1.2 G/DL (ref 0.67–1.58)
PROT SERPL-MCNC: 7.2 G/DL (ref 6–8.4)

## 2022-06-30 LAB
AMER SYCAMORE IGE QN: <0.35 KU/L
PATHOLOGIST INTERPRETATION SPE: NORMAL

## 2022-07-01 LAB
A ALTERNATA IGE QN: <0.1 KU/L
A FUMIGATUS IGE QN: <0.1 KU/L
ALLERGEN BOXELDER MAPLE TREE IGE: <0.1 KU/L
ALLERGEN CHAETOMIUM GLOBOSUM IGE: <0.1 KU/L
ALLERGEN MAPLE (BOX ELDER) CLASS: NORMAL
ALLERGEN MULBERRY CLASS: NORMAL
ALLERGEN MULBERRY TREE IGE: <0.1 KU/L
ALLERGEN WHITE ASH TREE IGE: <0.1 KU/L
ALLERGEN WHITE PINE TREE IGE: <0.1 KU/L
BAHIA GRASS IGE QN: <0.1 KU/L
BALD CYPRESS IGE QN: <0.1 KU/L
C HERBARUM IGE QN: <0.1 KU/L
C LUNATA IGE QN: <0.1 KU/L
CAT DANDER IGE QN: <0.1 KU/L
CHAETOMIUM GLOB. CLASS: NORMAL
COCKLEBUR IGE QN: <0.1 KU/L
COCKSFOOT IGE QN: <0.1 KU/L
COMMON RAGWEED IGE QN: <0.1 KU/L
COTTONWOOD IGE QN: <0.1 KU/L
D FARINAE IGE QN: <0.1 KU/L
D PTERONYSS IGE QN: <0.1 KU/L
DEPRECATED A ALTERNATA IGE RAST QL: NORMAL
DEPRECATED A FUMIGATUS IGE RAST QL: NORMAL
DEPRECATED BAHIA GRASS IGE RAST QL: NORMAL
DEPRECATED BALD CYPRESS IGE RAST QL: NORMAL
DEPRECATED C HERBARUM IGE RAST QL: NORMAL
DEPRECATED C LUNATA IGE RAST QL: NORMAL
DEPRECATED CAT DANDER IGE RAST QL: NORMAL
DEPRECATED COCKLEBUR IGE RAST QL: NORMAL
DEPRECATED COCKSFOOT IGE RAST QL: NORMAL
DEPRECATED COMMON RAGWEED IGE RAST QL: NORMAL
DEPRECATED COTTONWOOD IGE RAST QL: NORMAL
DEPRECATED D FARINAE IGE RAST QL: NORMAL
DEPRECATED D PTERONYSS IGE RAST QL: NORMAL
DEPRECATED DOG DANDER IGE RAST QL: NORMAL
DEPRECATED ELDER IGE RAST QL: NORMAL
DEPRECATED ENGL PLANTAIN IGE RAST QL: NORMAL
DEPRECATED GUM-TREE IGE RAST QL: NORMAL
DEPRECATED HACKBERRY TREE IGE RAST QL: NORMAL
DEPRECATED JOHNSON GRASS IGE RAST QL: NORMAL
DEPRECATED KENT BLUE GRASS IGE RAST QL: NORMAL
DEPRECATED LONDON PLANE IGE RAST QL: NORMAL
DEPRECATED MUGWORT IGE RAST QL: NORMAL
DEPRECATED NETTLE IGE RAST QL: NORMAL
DEPRECATED PECAN/HICK TREE IGE RAST QL: NORMAL
DEPRECATED PER RYE GRASS IGE RAST QL: NORMAL
DEPRECATED PRIVET IGE RAST QL: NORMAL
DEPRECATED RED TOP GRASS IGE RAST QL: NORMAL
DEPRECATED ROACH IGE RAST QL: NORMAL
DEPRECATED SALTWORT IGE RAST QL: NORMAL
DEPRECATED SHEEP SORREL IGE RAST QL: NORMAL
DEPRECATED SILVER BIRCH IGE RAST QL: NORMAL
DEPRECATED TIMOTHY IGE RAST QL: NORMAL
DEPRECATED WHITE OAK IGE RAST QL: NORMAL
DEPRECATED WILLOW IGE RAST QL: NORMAL
DOG DANDER IGE QN: <0.1 KU/L
ELDER IGE QN: <0.1 KU/L
ELM CEDAR CLASS: NORMAL
ELM CEDAR, IGE: <0.1 KU/L
ENGL PLANTAIN IGE QN: <0.1 KU/L
GUM-TREE IGE QN: <0.1 KU/L
HACKBERRY TREE IGE QN: <0.1 KU/L
JOHNSON GRASS IGE QN: <0.1 KU/L
KENT BLUE GRASS IGE QN: <0.1 KU/L
LONDON PLANE IGE QN: <0.1 KU/L
MUGWORT IGE QN: <0.1 KU/L
NETTLE IGE QN: <0.1 KU/L
PECAN/HICK TREE IGE QN: <0.1 KU/L
PER RYE GRASS IGE QN: <0.1 KU/L
PRIVET IGE QN: <0.1 KU/L
RED TOP GRASS IGE QN: <0.1 KU/L
ROACH IGE QN: <0.1 KU/L
SALTWORT IGE QN: <0.1 KU/L
SHEEP SORREL IGE QN: <0.1 KU/L
SILVER BIRCH IGE QN: <0.1 KU/L
STEMPHYLIUM HERBARUM CLASS: NORMAL
STEMPHYLLIUM, IGE: <0.1 KU/L
TIMOTHY IGE QN: <0.1 KU/L
WHITE ASH CLASS: NORMAL
WHITE OAK IGE QN: <0.1 KU/L
WHITE PINE CLASS: NORMAL
WILLOW IGE QN: <0.1 KU/L

## 2022-07-02 DIAGNOSIS — E11.9 TYPE 2 DIABETES MELLITUS WITHOUT COMPLICATION, WITHOUT LONG-TERM CURRENT USE OF INSULIN: ICD-10-CM

## 2022-07-02 NOTE — TELEPHONE ENCOUNTER
No new care gaps identified.  [facility] Embedded Care Gaps. Reference number: [id number]. 7/02/2022   7:26:04 AM LUIS ALFREDOT

## 2022-07-03 LAB — C1INH SERPL-MCNC: 35 MG/DL (ref 21–39)

## 2022-07-03 RX ORDER — SITAGLIPTIN 100 MG/1
TABLET, FILM COATED ORAL
Qty: 90 TABLET | Refills: 1 | Status: SHIPPED | OUTPATIENT
Start: 2022-07-03 | End: 2023-01-03 | Stop reason: SDUPTHER

## 2022-07-03 NOTE — TELEPHONE ENCOUNTER
Refill Authorization Note     Refill Decision Note   Edie Hammond  is requesting a refill authorization.  Brief Assessment and Rationale for Refill:  Approve     Medication Therapy Plan:           Comments:     No Care Gaps recommended.     Note composed:11:37 AM 07/03/2022

## 2022-07-07 LAB — IC SERPL C1Q BIND-ACNC: 4.8 UG EQ/ML (ref 0–3.9)

## 2022-07-08 LAB — C1INH FUNCTIONAL/C1INH TOTAL MFR SERPL: >100 %

## 2022-07-25 DIAGNOSIS — E11.59 HYPERTENSION ASSOCIATED WITH DIABETES: ICD-10-CM

## 2022-07-25 DIAGNOSIS — I15.2 HYPERTENSION ASSOCIATED WITH DIABETES: ICD-10-CM

## 2022-07-26 ENCOUNTER — TELEPHONE (OUTPATIENT)
Dept: ALLERGY | Facility: CLINIC | Age: 87
End: 2022-07-26
Payer: MEDICARE

## 2022-07-26 RX ORDER — HYDRALAZINE HYDROCHLORIDE 100 MG/1
TABLET, FILM COATED ORAL
Qty: 270 TABLET | Refills: 1 | Status: SHIPPED | OUTPATIENT
Start: 2022-07-26 | End: 2022-10-21

## 2022-07-26 NOTE — TELEPHONE ENCOUNTER
LV:4/22/22  LAV:10/4/21  Upcoming Appt:8/4/22    Pt requesting hydrALAZINE (APRESOLINE) 100 MG tablet  Last fill 1/11/22

## 2022-07-26 NOTE — TELEPHONE ENCOUNTER
Patient scheduled appointment in October and was placed on the  Cancel list.         ---- Message from Logan Saez sent at 7/26/2022 12:29 PM CDT -----  Contact: [phone]  Pt is calling in she is needing to reschedule apt, please call back, thanks

## 2022-08-04 ENCOUNTER — OFFICE VISIT (OUTPATIENT)
Dept: FAMILY MEDICINE | Facility: CLINIC | Age: 87
End: 2022-08-04
Payer: MEDICARE

## 2022-08-04 VITALS
OXYGEN SATURATION: 96 % | HEART RATE: 70 BPM | SYSTOLIC BLOOD PRESSURE: 112 MMHG | BODY MASS INDEX: 27.73 KG/M2 | TEMPERATURE: 99 F | HEIGHT: 59 IN | WEIGHT: 137.56 LBS | DIASTOLIC BLOOD PRESSURE: 46 MMHG

## 2022-08-04 DIAGNOSIS — I15.2 HYPERTENSION ASSOCIATED WITH DIABETES: Primary | ICD-10-CM

## 2022-08-04 DIAGNOSIS — I42.1 OBSTRUCTIVE HYPERTROPHIC CARDIOMYOPATHY: ICD-10-CM

## 2022-08-04 DIAGNOSIS — H91.93 BILATERAL HEARING LOSS, UNSPECIFIED HEARING LOSS TYPE: ICD-10-CM

## 2022-08-04 DIAGNOSIS — E11.9 TYPE 2 DIABETES MELLITUS WITHOUT COMPLICATION, WITHOUT LONG-TERM CURRENT USE OF INSULIN: ICD-10-CM

## 2022-08-04 DIAGNOSIS — Z78.0 POSTMENOPAUSAL: ICD-10-CM

## 2022-08-04 DIAGNOSIS — H40.1133 PRIMARY OPEN ANGLE GLAUCOMA (POAG) OF BOTH EYES, SEVERE STAGE: ICD-10-CM

## 2022-08-04 DIAGNOSIS — E03.9 HYPOTHYROIDISM, UNSPECIFIED TYPE: ICD-10-CM

## 2022-08-04 DIAGNOSIS — E11.59 HYPERTENSION ASSOCIATED WITH DIABETES: Primary | ICD-10-CM

## 2022-08-04 PROCEDURE — 1159F MED LIST DOCD IN RCRD: CPT | Mod: CPTII,S$GLB,, | Performed by: FAMILY MEDICINE

## 2022-08-04 PROCEDURE — 99999 PR PBB SHADOW E&M-EST. PATIENT-LVL V: CPT | Mod: PBBFAC,,, | Performed by: FAMILY MEDICINE

## 2022-08-04 PROCEDURE — 3288F PR FALLS RISK ASSESSMENT DOCUMENTED: ICD-10-PCS | Mod: CPTII,S$GLB,, | Performed by: FAMILY MEDICINE

## 2022-08-04 PROCEDURE — 1101F PR PT FALLS ASSESS DOC 0-1 FALLS W/OUT INJ PAST YR: ICD-10-PCS | Mod: CPTII,S$GLB,, | Performed by: FAMILY MEDICINE

## 2022-08-04 PROCEDURE — 1125F PR PAIN SEVERITY QUANTIFIED, PAIN PRESENT: ICD-10-PCS | Mod: CPTII,S$GLB,, | Performed by: FAMILY MEDICINE

## 2022-08-04 PROCEDURE — 3288F FALL RISK ASSESSMENT DOCD: CPT | Mod: CPTII,S$GLB,, | Performed by: FAMILY MEDICINE

## 2022-08-04 PROCEDURE — 1160F PR REVIEW ALL MEDS BY PRESCRIBER/CLIN PHARMACIST DOCUMENTED: ICD-10-PCS | Mod: CPTII,S$GLB,, | Performed by: FAMILY MEDICINE

## 2022-08-04 PROCEDURE — 1159F PR MEDICATION LIST DOCUMENTED IN MEDICAL RECORD: ICD-10-PCS | Mod: CPTII,S$GLB,, | Performed by: FAMILY MEDICINE

## 2022-08-04 PROCEDURE — 1125F AMNT PAIN NOTED PAIN PRSNT: CPT | Mod: CPTII,S$GLB,, | Performed by: FAMILY MEDICINE

## 2022-08-04 PROCEDURE — 99999 PR PBB SHADOW E&M-EST. PATIENT-LVL V: ICD-10-PCS | Mod: PBBFAC,,, | Performed by: FAMILY MEDICINE

## 2022-08-04 PROCEDURE — 99214 PR OFFICE/OUTPT VISIT, EST, LEVL IV, 30-39 MIN: ICD-10-PCS | Mod: S$GLB,,, | Performed by: FAMILY MEDICINE

## 2022-08-04 PROCEDURE — 1160F RVW MEDS BY RX/DR IN RCRD: CPT | Mod: CPTII,S$GLB,, | Performed by: FAMILY MEDICINE

## 2022-08-04 PROCEDURE — 99214 OFFICE O/P EST MOD 30 MIN: CPT | Mod: S$GLB,,, | Performed by: FAMILY MEDICINE

## 2022-08-04 PROCEDURE — 1101F PT FALLS ASSESS-DOCD LE1/YR: CPT | Mod: CPTII,S$GLB,, | Performed by: FAMILY MEDICINE

## 2022-08-04 NOTE — PROGRESS NOTES
Edie Hammond    Chief Complaint   Patient presents with    Follow-up    Hypertension    Diabetes       History of Present Illness:   Ms. Hammond comes in today for hypertension and diabetes follow-up.  She is not fasting but had taken medication today.  She states she monitors her diet but does not exercise.  She states for home blood pressure checks 2 times a week with levels ranging 112/50.  She states she checks home blood glucose levels twice daily (fasting and after dinner) with fasting levels ranging 's (88 this morning) and after dinner levels ranging < 200's (131 yesterday).      She saw Dr. Darnell, allergist, on June 28, 2022 for angioedema, subsequent encounter, lip swelling, cough, seasonal allergic rhinitis due to other allergic trigger.      She saw Dr. 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    She saw Dr. Sidney Reeves, ophthalmologist, on April 25, 2022 for surveillance of primary open-angle glaucoma, bilateral, severe stage.    She states she did not sleep well last night as she went to bed early.  She states she some times takes Melatonin at night to help her sleep at night.    She states her right hip is slightly painful (possible due to how she slept) but states hip pain is not new for her.  She states she takes Tylenol with help.    She reports chronic fatigue and somnolence during day and chronic, intermittent lower back aches.    Otherwise, she denies having fever, chills, appetite changes; shortness of breath, cough, wheezing; chest pain, palpitations, leg swelling; abdominal pain, nausea, vomiting, diarrhea, constipation; unusual urinary symptoms; polydipsia, polyphagia, polyuria, hot or cold intolerance;  headache; anxiety, depression, homicidal or suicidal thoughts.            Labs:                      WBC                      9.25                06/28/2022                 HGB                      12.0                06/28/2022                 HCT                      38.0                06/28/2022                 PLT                      375                 06/28/2022                 CHOL                     180                 10/04/2021                 TRIG                     108                 10/04/2021                 HDL                      66                  10/04/2021                 ALT                      8 (L)               06/28/2022                 AST                      15                  06/28/2022                 NA                       137                 06/28/2022                 K                        4.3                 06/28/2022                 CL                       98                  06/28/2022                 CREATININE               0.7                 06/28/2022                 BUN                      18                  06/28/2022                 CO2                      25                  06/28/2022                 TSH                      3.675               04/22/2022                 HGBA1C                   5.9 (H)             04/22/2022             LDLCALC                  92.4                10/04/2021                Current Outpatient Medications   Medication Sig    amLODIPine (NORVASC) 5 MG tablet Take 5 mg by mouth once daily.     blood sugar diagnostic (BLOOD GLUCOSE TEST) Strp Use twice daily prn. Dx: E11.9    bumetanide (BUMEX) 1 MG tablet But also states some times takes 3 times per week    cetirizine (ZYRTEC) 10 MG tablet Take 10 mg by mouth once daily.     cloNIDine (CATAPRES) 0.1 MG tablet Take 0.1 mg by mouth daily as needed (takes only if SBP > 180 per cardiologist).     dorzolamide-timolol 2-0.5% (COSOPT) 22.3-6.8 mg/mL ophthalmic solution 1 drop.     doxazosin (CARDURA) 1 MG tablet Take 1 mg by mouth nightly.    EPINEPHrine (EPIPEN) 0.3 mg/0.3 mL AtIn INJECT 0.3MLS INTO THE MUSCLE ONCE FOR 1 DOSE    hydrALAZINE (APRESOLINE) 100 MG tablet TAKE ONE TABLET BY MOUTH THREE TIMES DAILY    JANUVIA 100 mg Tab TAKE ONE TABLET BY MOUTH ONCE DAILY    lancets Misc Use twice daily prn. Dx: E11.9    latanoprost 0.005 % ophthalmic solution SMARTSI Drop(s) In Eye(s) Every Evening    levothyroxine (SYNTHROID) 50 MCG tablet TAKE ONE TABLET BY MOUTH ONCE BEFORE BREAKFAST DAILY    meclizine (ANTIVERT) 12.5 mg tablet Take 1 tablet (12.5 mg total) by mouth 3 (three) times daily as needed for Dizziness.    montelukast (SINGULAIR) 10 mg tablet TAKE ONE TABLET BY MOUTH ONE TIME DAILY IN THE EVENING    multivitamin with minerals tablet Take 1 tablet by mouth once daily.     nystatin (MYCOSTATIN) cream APPLY TOPICALLY TWICE DAILY AS NEEDED FOR IRRITATED  SKIN (Patient taking differently: APPLY TOPICALLY TWICE DAILY AS NEEDED FOR IRRITATED  SKIN)    blood-glucose meter kit Use as instructed - dx: E11.9       Review of Systems   Constitutional:  Positive for activity change and fatigue. Negative for appetite change, chills and fever.        Weight 65 kg (143 lb 4.8 oz) at 2022 visit.   HENT:  Negative for congestion.    Eyes:  Negative for visual disturbance.        See history of present illness.   Respiratory:  Negative for cough, shortness of breath and wheezing.    Cardiovascular:  Negative for chest pain, palpitations and leg swelling.        See history of present illness.   Gastrointestinal:  Negative for abdominal pain, constipation, diarrhea, nausea and vomiting.   Endocrine: Negative for cold intolerance, heat intolerance, polydipsia, polyphagia and polyuria.        See history of present illness.   Genitourinary:  Negative for difficulty urinating.   Musculoskeletal:  Positive for arthralgias and back pain.        See history of present illness.      Allergic/Immunologic:        See history of present illness.   Neurological:  Negative for weakness and headaches.   Psychiatric/Behavioral:  Positive for sleep disturbance. Negative for confusion, dysphoric mood and suicidal ideas. The patient is not nervous/anxious.         Negative for homicidal ideas. See history of present illness.     Objective:  Physical Exam  Vitals and nursing note reviewed.   Constitutional:       General: She is not in acute distress.     Appearance: Normal appearance. She is well-developed. She is not ill-appearing, toxic-appearing or diaphoretic.   Cardiovascular:      Rate and Rhythm: Normal rate and regular rhythm.      Pulses:           Dorsalis pedis pulses are 3+ on the right side and 3+ on the left side.      Heart sounds: Murmur heard.     No gallop.   Pulmonary:      Effort: Pulmonary effort is normal.      Breath sounds: Normal breath sounds.   Abdominal:      General: Bowel sounds are normal. There is no distension.      Palpations: Abdomen is soft. There is no mass.      Tenderness: There is no abdominal tenderness. There is no guarding or rebound.   Musculoskeletal:         General: No swelling or tenderness. Normal range of motion.      Cervical back: Normal range of motion and neck supple. No tenderness.      Comments: She is ambulatory with assistance of cane.   Feet:      Right foot:      Protective Sensation: 5 sites tested.  5 sites sensed.      Skin integrity: No ulcer or skin breakdown.      Left foot:      Protective Sensation: 5 sites tested.  5 sites sensed.      Skin integrity: No ulcer or skin breakdown.   Lymphadenopathy:      Cervical: No cervical adenopathy.   Skin:     General: Skin is warm and dry.   Neurological:      Mental Status: She is alert.      Motor: No abnormal muscle tone.   Psychiatric:         Mood and Affect: Mood normal.         Speech: Speech normal.         Behavior: Behavior normal.         Thought Content: Thought content normal.          Judgment: Judgment normal.       ASSESSMENT:  1. Hypertension associated with diabetes    2. Type 2 diabetes mellitus without complication, without long-term current use of insulin    3. Hypothyroidism, unspecified type    4. Obstructive hypertrophic cardiomyopathy    5. Primary open angle glaucoma (POAG) of both eyes, severe stage    6. Bilateral hearing loss, unspecified hearing loss type    7. Postmenopausal        PLAN:  Edie was seen today for follow-up, hypertension and diabetes.    Diagnoses and all orders for this visit:    Hypertension associated with diabetes    Type 2 diabetes mellitus without complication, without long-term current use of insulin    Hypothyroidism, unspecified type    Obstructive hypertrophic cardiomyopathy    Primary open angle glaucoma (POAG) of both eyes, severe stage    Bilateral hearing loss, unspecified hearing loss type    Postmenopausal     No albs today.  Continue current medications, follow low sodium, low cholesterol, low carb diet, daily walks.  I advised patient Zyrtec may be cause for somnolence.  Keep follow up with specialists.  Flu shot this fall.  Follow up in about 2 months (around 10/4/2022) for physical.

## 2022-09-19 ENCOUNTER — OFFICE VISIT (OUTPATIENT)
Dept: ALLERGY | Facility: CLINIC | Age: 87
End: 2022-09-19
Payer: MEDICARE

## 2022-09-19 VITALS
DIASTOLIC BLOOD PRESSURE: 61 MMHG | BODY MASS INDEX: 27.29 KG/M2 | WEIGHT: 135.38 LBS | SYSTOLIC BLOOD PRESSURE: 136 MMHG | HEIGHT: 59 IN | TEMPERATURE: 98 F | HEART RATE: 66 BPM

## 2022-09-19 DIAGNOSIS — R06.09 DYSPNEA ON EXERTION: ICD-10-CM

## 2022-09-19 DIAGNOSIS — J31.0 NON-ALLERGIC RHINITIS: ICD-10-CM

## 2022-09-19 DIAGNOSIS — T78.3XXD ANGIOEDEMA, SUBSEQUENT ENCOUNTER: Primary | ICD-10-CM

## 2022-09-19 PROCEDURE — 99999 PR PBB SHADOW E&M-EST. PATIENT-LVL IV: CPT | Mod: PBBFAC,,, | Performed by: ALLERGY & IMMUNOLOGY

## 2022-09-19 PROCEDURE — 99214 PR OFFICE/OUTPT VISIT, EST, LEVL IV, 30-39 MIN: ICD-10-PCS | Mod: S$GLB,,, | Performed by: ALLERGY & IMMUNOLOGY

## 2022-09-19 PROCEDURE — 3288F PR FALLS RISK ASSESSMENT DOCUMENTED: ICD-10-PCS | Mod: CPTII,S$GLB,, | Performed by: ALLERGY & IMMUNOLOGY

## 2022-09-19 PROCEDURE — 99214 OFFICE O/P EST MOD 30 MIN: CPT | Mod: S$GLB,,, | Performed by: ALLERGY & IMMUNOLOGY

## 2022-09-19 PROCEDURE — 3288F FALL RISK ASSESSMENT DOCD: CPT | Mod: CPTII,S$GLB,, | Performed by: ALLERGY & IMMUNOLOGY

## 2022-09-19 PROCEDURE — 1126F AMNT PAIN NOTED NONE PRSNT: CPT | Mod: CPTII,S$GLB,, | Performed by: ALLERGY & IMMUNOLOGY

## 2022-09-19 PROCEDURE — 99999 PR PBB SHADOW E&M-EST. PATIENT-LVL IV: ICD-10-PCS | Mod: PBBFAC,,, | Performed by: ALLERGY & IMMUNOLOGY

## 2022-09-19 PROCEDURE — 1126F PR PAIN SEVERITY QUANTIFIED, NO PAIN PRESENT: ICD-10-PCS | Mod: CPTII,S$GLB,, | Performed by: ALLERGY & IMMUNOLOGY

## 2022-09-19 PROCEDURE — 1101F PR PT FALLS ASSESS DOC 0-1 FALLS W/OUT INJ PAST YR: ICD-10-PCS | Mod: CPTII,S$GLB,, | Performed by: ALLERGY & IMMUNOLOGY

## 2022-09-19 PROCEDURE — 1159F MED LIST DOCD IN RCRD: CPT | Mod: CPTII,S$GLB,, | Performed by: ALLERGY & IMMUNOLOGY

## 2022-09-19 PROCEDURE — 1159F PR MEDICATION LIST DOCUMENTED IN MEDICAL RECORD: ICD-10-PCS | Mod: CPTII,S$GLB,, | Performed by: ALLERGY & IMMUNOLOGY

## 2022-09-19 PROCEDURE — 1101F PT FALLS ASSESS-DOCD LE1/YR: CPT | Mod: CPTII,S$GLB,, | Performed by: ALLERGY & IMMUNOLOGY

## 2022-09-19 RX ORDER — IPRATROPIUM BROMIDE 21 UG/1
2 SPRAY, METERED NASAL 3 TIMES DAILY
Qty: 20 ML | Refills: 5 | Status: SHIPPED | OUTPATIENT
Start: 2022-09-19 | End: 2023-01-17

## 2022-09-19 RX ORDER — IPRATROPIUM BROMIDE 21 UG/1
2 SPRAY, METERED NASAL 3 TIMES DAILY
Qty: 20 ML | Refills: 5 | Status: SHIPPED | OUTPATIENT
Start: 2022-09-19 | End: 2022-09-19 | Stop reason: SDUPTHER

## 2022-09-19 RX ORDER — AMLODIPINE BESYLATE 10 MG/1
10 TABLET ORAL DAILY
COMMUNITY
Start: 2022-07-25

## 2022-09-19 RX ORDER — ALBUTEROL SULFATE 90 UG/1
2 AEROSOL, METERED RESPIRATORY (INHALATION) EVERY 6 HOURS PRN
Qty: 12 G | Refills: 1 | Status: SHIPPED | OUTPATIENT
Start: 2022-09-19 | End: 2022-12-29

## 2022-09-19 RX ORDER — NITROGLYCERIN 0.4 MG/1
TABLET SUBLINGUAL
COMMUNITY
Start: 2022-07-24

## 2022-09-19 NOTE — PROGRESS NOTES
"Subjective:       Patient ID: Edie Hammond is a 89 y.o. female.      Chief Complaint:  Follow-up      HPI 6/7/2022: 89 year old female with a history of tongue swelling 5/5/2021. She reports a history of a sore throat. She took an Alleve and chamomile tea an hour prior to the onset of the tongue swelling. She denies inability to breath. She reports that she had neck swelling as well. She did not go to the ER. She saw her PA the next day, who prescribed prednisone. She reports that symptoms began to resolve the following day- 5/6/2021. She reports taking Alleve once, since the event without symptoms.  She does not have an Epipen.      HPI 6/28/2022: 89 year old female here for follow up. She denies anaphylaxis or tongue swelling, since she was seen here previously. Last week- lower lip swelled. Swelling lasted 18 hours. Occurred when she was not taking montelukast for a week. She is taking Cetirizine.   Intermittent- every other month/every 6 weeks- sneezing and coughing; no itchy eyes        HPI today: 89 year old female here for follow up. She reports a mild runny nose and cough over the last 10 days. She also reports drowsiness during the day.    She rarely takes Clonidine.  She stopped Zyrtec, but restarted last week. She feels that it helped "some".    She denies tongue swelling.  She avoids ibuprofen and Nsaids.        Past Medical History:   Diagnosis Date    Diabetes     Glaucoma     Follows with Dr. James Reeves, ophthalmologist    Hand arthritis     Heart murmur     Follows with Dr. Faisal Milton, cardiology    HTN (hypertension)     Hypothyroidism     Intracranial hemorrhage     Postmenopausal     Seasonal allergies     Type 2 diabetes mellitus      Family History   Problem Relation Age of Onset    No Known Problems Son     Heart attack Mother     Diabetes Sister     Hypertension Sister     Hypertension Sister     Breast cancer Maternal Aunt     Stroke Neg Hx      Current Outpatient Medications " on File Prior to Visit   Medication Sig Dispense Refill    amLODIPine (NORVASC) 10 MG tablet Take 10 mg by mouth once daily.      blood sugar diagnostic (BLOOD GLUCOSE TEST) Strp Use twice daily prn. Dx: E11.9 300 each 3    bumetanide (BUMEX) 1 MG tablet But also states some times takes 3 times per week      cetirizine (ZYRTEC) 10 MG tablet Take 10 mg by mouth once daily.       cloNIDine (CATAPRES) 0.1 MG tablet Take 0.1 mg by mouth daily as needed (takes only if SBP > 180 per cardiologist).       dorzolamide-timolol 2-0.5% (COSOPT) 22.3-6.8 mg/mL ophthalmic solution 1 drop.      doxazosin (CARDURA) 1 MG tablet Take 1 mg by mouth nightly.      EPINEPHrine (EPIPEN) 0.3 mg/0.3 mL AtIn INJECT 0.3MLS INTO THE MUSCLE ONCE FOR 1 DOSE 2 each 0    hydrALAZINE (APRESOLINE) 100 MG tablet TAKE ONE TABLET BY MOUTH THREE TIMES DAILY 270 tablet 1    JANUVIA 100 mg Tab TAKE ONE TABLET BY MOUTH ONCE DAILY 90 tablet 1    lancets Misc Use twice daily prn. Dx: E11.9 300 each 3    latanoprost 0.005 % ophthalmic solution SMARTSI Drop(s) In Eye(s) Every Evening      levothyroxine (SYNTHROID) 50 MCG tablet TAKE ONE TABLET BY MOUTH ONCE BEFORE BREAKFAST DAILY 90 tablet 1    meclizine (ANTIVERT) 12.5 mg tablet Take 1 tablet (12.5 mg total) by mouth 3 (three) times daily as needed for Dizziness. 30 tablet 0    montelukast (SINGULAIR) 10 mg tablet TAKE ONE TABLET BY MOUTH IN THE EVENING 30 tablet 0    multivitamin with minerals tablet Take 1 tablet by mouth once daily.       nitroGLYCERIN (NITROSTAT) 0.4 MG SL tablet Place under the tongue.      nystatin (MYCOSTATIN) cream APPLY TOPICALLY TWICE DAILY AS NEEDED FOR IRRITATED  SKIN (Patient taking differently: APPLY TOPICALLY TWICE DAILY AS NEEDED FOR IRRITATED  SKIN) 30 g 3    blood-glucose meter kit Use as instructed - dx: E11.9 1 each 0     No current facility-administered medications on file prior to visit.     Review of patient's allergies indicates:   Allergen Reactions    Brimonidine  tartrate Itching and Swelling    Losartan Anaphylaxis    Amlodipine besylate Edema     Other reaction(s): swelling of feet    Metformin hcl Diarrhea       Environmental History:  no pets  She does not smoke. 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  Review of Systems   Constitutional:  Negative for chills and fever.   HENT:  Negative for congestion and rhinorrhea.    Eyes:  Negative for discharge and itching.   Respiratory:  Negative for cough, shortness of breath and wheezing.    Cardiovascular:  Negative for chest pain and leg swelling.   Gastrointestinal:  Negative for nausea and vomiting.   Endocrine: Negative for cold intolerance and heat intolerance.   Skin:  Negative for rash and wound.   Allergic/Immunologic: Positive for environmental allergies. Negative for food allergies.   Neurological:  Negative for facial asymmetry and speech difficulty.   Hematological:  Negative for adenopathy. Does not bruise/bleed easily.   Psychiatric/Behavioral:  Negative for behavioral problems and suicidal ideas.       Objective:    Physical Exam  Vitals reviewed.   Constitutional:       General: She is not in acute distress.     Appearance: Normal appearance. She is well-developed. She is not ill-appearing, toxic-appearing or diaphoretic.   HENT:      Head: Normocephalic and atraumatic.      Right Ear: Tympanic membrane, ear canal and external ear normal. There is no impacted cerumen.      Left Ear: Tympanic membrane, ear canal and external ear normal. There is no impacted cerumen.      Nose: Nose normal. No congestion or rhinorrhea.      Mouth/Throat:      Pharynx: No oropharyngeal exudate or posterior oropharyngeal erythema.   Eyes:      General: No scleral icterus.        Right eye: No discharge.         Left eye: No discharge.      Pupils: Pupils are equal, round, and reactive to light.   Neck:      Thyroid: No thyromegaly.   Cardiovascular:      Rate and Rhythm: Normal rate and  regular rhythm.      Heart sounds: Normal heart sounds. No murmur heard.    No friction rub. No gallop.   Pulmonary:      Effort: Pulmonary effort is normal. No respiratory distress.      Breath sounds: Normal breath sounds. No stridor. No wheezing, rhonchi or rales.   Chest:      Chest wall: No tenderness.   Musculoskeletal:      Cervical back: Normal range of motion and neck supple. No rigidity. No muscular tenderness.      Left lower leg: No edema.   Lymphadenopathy:      Cervical: No cervical adenopathy.   Skin:     General: Skin is warm.      Coloration: Skin is not jaundiced or pale.      Findings: No bruising or erythema.   Neurological:      Mental Status: She is alert and oriented to person, place, and time.   Psychiatric:         Mood and Affect: Mood normal.         Behavior: Behavior normal.         Thought Content: Thought content normal.         Judgment: Judgment normal.         Assessment:       1. Angioedema, subsequent encounter    2. Non-allergic rhinitis    3. Dyspnea on exertion           Plan:       Recommend she avoid Losartan and NSAIDs.   Continue Cetirizine. Okay to stop Singulair.  Epipen  Adding Atrovent    Aortic stenosis - likely cause of shortness of breath. Will forward note to her Cardiologist-Faisal Milton MD    Angioedema, subsequent encounter  -     Discontinue: ipratropium (ATROVENT) 21 mcg (0.03 %) nasal spray; 2 sprays by Nasal route 3 (three) times daily.  Dispense: 20 mL; Refill: 5  -     ipratropium (ATROVENT) 21 mcg (0.03 %) nasal spray; 2 sprays by Nasal route 3 (three) times daily.  Dispense: 20 mL; Refill: 5    Non-allergic rhinitis    Dyspnea on exertion  -     albuterol (VENTOLIN HFA) 90 mcg/actuation inhaler; Inhale 2 puffs into the lungs every 6 (six) hours as needed for Wheezing. Rescue  Dispense: 12 g; Refill: 1    RTC 6 months or sooner, if needed.    CORNEL GAN spent a total of 30 minutes on the day of the visit.  This includes face to face time and non-face  to face time preparing to see the patient (eg, review of tests), obtaining and/or reviewing separately obtained history, documenting clinical information in the electronic or other health record, independently interpreting results and communicating results to the patient/family/caregiver, or care coordinator.    CC:Faisal Milton

## 2022-10-06 ENCOUNTER — HOSPITAL ENCOUNTER (OUTPATIENT)
Dept: RADIOLOGY | Facility: HOSPITAL | Age: 87
Discharge: HOME | End: 2022-10-06
Attending: FAMILY MEDICINE
Payer: MEDICARE

## 2022-10-06 ENCOUNTER — OFFICE VISIT (OUTPATIENT)
Dept: FAMILY MEDICINE | Facility: CLINIC | Age: 87
End: 2022-10-06
Payer: MEDICARE

## 2022-10-06 VITALS
WEIGHT: 133.81 LBS | SYSTOLIC BLOOD PRESSURE: 137 MMHG | HEART RATE: 75 BPM | HEIGHT: 59 IN | BODY MASS INDEX: 26.98 KG/M2 | OXYGEN SATURATION: 98 % | DIASTOLIC BLOOD PRESSURE: 58 MMHG | TEMPERATURE: 96 F

## 2022-10-06 DIAGNOSIS — E11.59 HYPERTENSION ASSOCIATED WITH DIABETES: Primary | ICD-10-CM

## 2022-10-06 DIAGNOSIS — E66.3 OVERWEIGHT (BMI 25.0-29.9): ICD-10-CM

## 2022-10-06 DIAGNOSIS — H91.93 BILATERAL HEARING LOSS, UNSPECIFIED HEARING LOSS TYPE: ICD-10-CM

## 2022-10-06 DIAGNOSIS — E11.9 TYPE 2 DIABETES MELLITUS WITHOUT COMPLICATION, WITHOUT LONG-TERM CURRENT USE OF INSULIN: ICD-10-CM

## 2022-10-06 DIAGNOSIS — E03.9 HYPOTHYROIDISM, UNSPECIFIED TYPE: ICD-10-CM

## 2022-10-06 DIAGNOSIS — Z12.31 OTHER SCREENING MAMMOGRAM: ICD-10-CM

## 2022-10-06 DIAGNOSIS — R06.09 DOE (DYSPNEA ON EXERTION): ICD-10-CM

## 2022-10-06 DIAGNOSIS — I15.2 HYPERTENSION ASSOCIATED WITH DIABETES: Primary | ICD-10-CM

## 2022-10-06 DIAGNOSIS — I35.0 NONRHEUMATIC AORTIC VALVE STENOSIS: ICD-10-CM

## 2022-10-06 DIAGNOSIS — Z78.0 POSTMENOPAUSAL: ICD-10-CM

## 2022-10-06 DIAGNOSIS — H40.1133 PRIMARY OPEN ANGLE GLAUCOMA (POAG) OF BOTH EYES, SEVERE STAGE: ICD-10-CM

## 2022-10-06 PROCEDURE — 1101F PR PT FALLS ASSESS DOC 0-1 FALLS W/OUT INJ PAST YR: ICD-10-PCS | Mod: CPTII,S$GLB,, | Performed by: FAMILY MEDICINE

## 2022-10-06 PROCEDURE — 1160F RVW MEDS BY RX/DR IN RCRD: CPT | Mod: CPTII,S$GLB,, | Performed by: FAMILY MEDICINE

## 2022-10-06 PROCEDURE — 71046 X-RAY EXAM CHEST 2 VIEWS: CPT | Mod: TC,FY,PO

## 2022-10-06 PROCEDURE — 99999 PR PBB SHADOW E&M-EST. PATIENT-LVL V: ICD-10-PCS | Mod: PBBFAC,,, | Performed by: FAMILY MEDICINE

## 2022-10-06 PROCEDURE — 1101F PT FALLS ASSESS-DOCD LE1/YR: CPT | Mod: CPTII,S$GLB,, | Performed by: FAMILY MEDICINE

## 2022-10-06 PROCEDURE — 99999 PR PBB SHADOW E&M-EST. PATIENT-LVL V: CPT | Mod: PBBFAC,,, | Performed by: FAMILY MEDICINE

## 2022-10-06 PROCEDURE — 99215 PR OFFICE/OUTPT VISIT, EST, LEVL V, 40-54 MIN: ICD-10-PCS | Mod: S$GLB,,, | Performed by: FAMILY MEDICINE

## 2022-10-06 PROCEDURE — 1126F AMNT PAIN NOTED NONE PRSNT: CPT | Mod: CPTII,S$GLB,, | Performed by: FAMILY MEDICINE

## 2022-10-06 PROCEDURE — 1159F MED LIST DOCD IN RCRD: CPT | Mod: CPTII,S$GLB,, | Performed by: FAMILY MEDICINE

## 2022-10-06 PROCEDURE — 1160F PR REVIEW ALL MEDS BY PRESCRIBER/CLIN PHARMACIST DOCUMENTED: ICD-10-PCS | Mod: CPTII,S$GLB,, | Performed by: FAMILY MEDICINE

## 2022-10-06 PROCEDURE — 1159F PR MEDICATION LIST DOCUMENTED IN MEDICAL RECORD: ICD-10-PCS | Mod: CPTII,S$GLB,, | Performed by: FAMILY MEDICINE

## 2022-10-06 PROCEDURE — 71046 XR CHEST PA AND LATERAL: ICD-10-PCS | Mod: 26,,, | Performed by: RADIOLOGY

## 2022-10-06 PROCEDURE — 1126F PR PAIN SEVERITY QUANTIFIED, NO PAIN PRESENT: ICD-10-PCS | Mod: CPTII,S$GLB,, | Performed by: FAMILY MEDICINE

## 2022-10-06 PROCEDURE — 3288F PR FALLS RISK ASSESSMENT DOCUMENTED: ICD-10-PCS | Mod: CPTII,S$GLB,, | Performed by: FAMILY MEDICINE

## 2022-10-06 PROCEDURE — 99215 OFFICE O/P EST HI 40 MIN: CPT | Mod: S$GLB,,, | Performed by: FAMILY MEDICINE

## 2022-10-06 PROCEDURE — 3288F FALL RISK ASSESSMENT DOCD: CPT | Mod: CPTII,S$GLB,, | Performed by: FAMILY MEDICINE

## 2022-10-06 PROCEDURE — 71046 X-RAY EXAM CHEST 2 VIEWS: CPT | Mod: 26,,, | Performed by: RADIOLOGY

## 2022-10-06 NOTE — PROGRESS NOTES
HISTORY OF PRESENT ILLNESS: Ms. Hammond comes in today not fasting and with taking medication today.     END OF LIFE DECISION: She does have a living will. She does not desire life support.    Current Outpatient Medications   Medication Sig    amLODIPine (NORVASC) 10 MG tablet Take 10 mg by mouth once daily.    blood sugar diagnostic (BLOOD GLUCOSE TEST) Strp Use twice daily prn. Dx: E11.9    bumetanide (BUMEX) 1 MG tablet But also states some times takes 3 times per week    cetirizine (ZYRTEC) 10 MG tablet Take 10 mg by mouth once daily.     cloNIDine (CATAPRES) 0.1 MG tablet Take 0.1 mg by mouth daily as needed (takes only if SBP > 180 per cardiologist).     dorzolamide-timolol 2-0.5% (COSOPT) 22.3-6.8 mg/mL ophthalmic solution 1 drop.    doxazosin (CARDURA) 1 MG tablet Take 1 mg by mouth nightly.    EPINEPHrine (EPIPEN) 0.3 mg/0.3 mL AtIn INJECT 0.3MLS INTO THE MUSCLE ONCE FOR 1 DOSE    hydrALAZINE (APRESOLINE) 100 MG tablet TAKE ONE TABLET BY MOUTH THREE TIMES DAILY    ipratropium (ATROVENT) 21 mcg (0.03 %) nasal spray 2 sprays by Nasal route 3 (three) times daily.    JANUVIA 100 mg Tab TAKE ONE TABLET BY MOUTH ONCE DAILY    lancets Misc Use twice daily prn. Dx: E11.9    latanoprost 0.005 % ophthalmic solution SMARTSI Drop(s) In Eye(s) Every Evening    levothyroxine (SYNTHROID) 50 MCG tablet TAKE ONE TABLET BY MOUTH ONCE BEFORE BREAKFAST DAILY    meclizine (ANTIVERT) 12.5 mg tablet Take 1 tablet (12.5 mg total) by mouth 3 (three) times daily as needed for Dizziness.    montelukast (SINGULAIR) 10 mg tablet TAKE ONE TABLET BY MOUTH IN THE EVENING    multivitamin with minerals tablet Take 1 tablet by mouth once daily.     nitroGLYCERIN (NITROSTAT) 0.4 MG SL tablet Place under the tongue.    nystatin (MYCOSTATIN) cream APPLY TOPICALLY TWICE DAILY AS NEEDED FOR IRRITATED  SKIN (Patient taking differently: APPLY TOPICALLY TWICE DAILY AS NEEDED FOR IRRITATED  SKIN)    albuterol (VENTOLIN HFA) 90 mcg/actuation inhaler  Inhale 2 puffs into the lungs every 6 (six) hours as needed for Wheezing. Rescue (Patient not taking: Reported on 10/6/2022)    blood-glucose meter kit Use as instructed - dx: E11.9      SCREENINGS:    Cholesterol: October 4, 2021.  FFS/Colonoscopy: In the past per patient. No longer needed due to her age.  Dexa Scan: Never.  Mammogram: October 19, 2021 - okay.  She desires.  GYN Exam:  In the past per patient.  Eye Exam: Follows with Dr. Sidney Reeves for glaucoma surveillance with last visit on April 25, 2022.   Dental Exam: May 2021 per patient. Due.   PPD: Negative in the past per patient.     Immunizations:   Tdap - > 10 years ago per patient. Advised patient insurance-covered benefit only at local pharmacy.  Zostavax - December 16, 2015.  Shingrix - Never. Advised patient insurance-covered benefit only at local pharmacy.     Prevnar -13-  August 10, 2017.  Pneumovax -  September 24, 2018  Seasonal Flu - October 4, 2021.  COVID-19 (Pfizer) vaccine series - January 5, 2021, January 26, 2021, November 9, 2021, April 15, 2022. September 16, 2022.                       ROS:  GENERAL: Denies fever, chills, fatigue or unusual weight change. Weight 62.4 kg (137 lb 9.1 oz) at August 4, 2022 visit. Appetite normal. Exercises does not. Monitors diet does.  SKIN: Denies rashes, itching, changes in mole, color or texture of skin or easy bruising.  HEAD:  Denies headaches or recent head trauma. Reports chronic lightheaded and unsteady without falls sometimes after taking medication.  EYES: Denies change in vision, pain, diplopia, redness or discharge.  EARS: Denies ear pain, discharge or decreased hearing. Reports occasional vertigo (only with lying down) and takes Meclizine 12.5 mg 1/2 to 1 pill twice daily prn vertigo prescribed by Dr. Darlene Null, ENT, in 2016.  NOSE: Denies loss of smell, epistaxis or rhinitis. Saw Dr. Jacqueline Darnell, Allergist, on September 19, 2022 for angioedema, allergies, JANG.  MOUTH & THROAT: Denies  "hoarseness or change in voice. Denies excessive gum bleeding or mouth sores.  Denies sore throat.  NODES: Denies swollen glands.  CHEST: Denies wheezing, cough, or sputum production. Reports JANG with walking x 2 months.  CARDIOVASCULAR: Denies chest pain, PND, orthopnea or reduced exercise tolerance.  Denies palpitations. Reports saw Dr. Faisal Milton, cardiologist, on June 17, 2022 but also more recently with EKG (normal) and advised concerned symptoms likely not related to AS. Occasionally performs home blood pressure checks with levels ranging 130-140's/50-60's.   ABDOMEN: Denies diarrhea, constipation, nausea, vomiting, abdominal pain, or blood in stool.  URINARY: Denies flank pain, dysuria or hematuria.  GENITOURINARY: Denies flank pain, dysuria, frequency or hematuria. No change in menses. Performs monthly breast exams some times.  ENDOCRINE: Denies, diabetes, thyroid, cholesterol problems. Performs home glucose checks twice daily (before breakfast and after dinner) with levels of 156 yesterday evening and 94 this morning.   HEME/LYMPH: Denies bleeding problems.  PERIPHERAL VASCULAR:Denies claudication or cyanosis.  MUSCULOSKELETAL: Denies joint stiffness, pain or swelling except chronic, intermittent left shoulder pain s/p falls in the past but no pain today. Denies edema.  NEUROLOGIC: Denies history of seizures, tremors, paralysis, alteration of gait or coordination.  PSYCHIATRIC: Denies mood swings, depression anxiety, homicidal or suicidal thoughts. Reports occasional sleepy/drowsy without fall.      PE:   VS: Blood Pressure (Abnormal) 137/58   Pulse 75   Temperature 96.2 °F (35.7 °C)   Height 4' 11" (1.499 m)   Weight 60.7 kg (133 lb 13.1 oz)   Oxygen Saturation 98%   Body Mass Index 27.03 kg/m²    APPEARANCE:  Well nourished, well developed female, pleasant, elderly, and overweight, alert and oriented in no acute distress.    HEAD: Nontender. Full range of motion.  EYES: PERRL, conjunctiva pink, " lids no edema.   EARS: External canal patent, no swelling or redness. TM's shiny and clear.  NOSE: Mucosa and turbinates pink, not swollen. No discharge. Nontender sinuses.  THROAT: No pharyngeal erythema or exudate. No stridor.   NECK: Supple, no mass, thyroid not enlarged.  NODES: No cervical lymph node enlargement.  CHEST: Normal respiratory effort. Lungs clear to auscultation.  CARDIOVASCULAR: Normal S1, S2. No rubs or gallops. Chronic murmur noted. PMI not displaced. No carotid bruit. Pedal pulses palpable bilaterally. No edema.  ABDOMEN: Bowel sounds present. Not distended. Soft. No tenderness, masses or organomegaly.  BREAST EXAM: Not examined per patient request.  PELVIC EXAM: Not examined per patient request.  RECTAL EXAM: Not examined per patient request.  MUSCULOSKELETAL: No joint deformities or stiffness. She is ambulatory without problems.  SKIN: No rashes or suspicious lesions, normal color and turgor. Chronic benign-appearing seborrheic keratosis at mid-upper back noted.  NEUROLOGIC:   Cranial Nerves: II-XII grossly intact.   DTR's: Knees, Ankles 2+ and equal bilaterally. Gait & Posture: Normal gait and fine motion.  PSYCHIATRIC:Patient alert, oriented x 3. Mood/Affect normal without acute anxiety or depression noted. Judgment/insight good as she makes appropriate decisions during today's exam.    Protective Sensation (w/ 10 gram monofilament):  Right: Intact  Left: Intact    Visual Inspection:  Normal -  Bilateral    Pedal Pulses:   Right: Present  Left: Present    Posterior tibialis:   Right:Present  Left: Present     ASSESSMENT:    ICD-10-CM ICD-9-CM    1. Hypertension associated with diabetes  E11.59 250.80 Comprehensive Metabolic Panel    I15.2 401.9 Lipid Panel      CBC Auto Differential      TSH      2. Type 2 diabetes mellitus without complication, without long-term current use of insulin  E11.9 250.00 Comprehensive Metabolic Panel      Hemoglobin A1C      Microalbumin/Creatinine Ratio, Urine       3. Hypothyroidism, unspecified type  E03.9 244.9 TSH      4. Nonrheumatic aortic valve stenosis  I35.0 424.1       5. JANG (dyspnea on exertion)  R06.09 786.09 X-Ray Chest PA And Lateral      6. Primary open angle glaucoma (POAG) of both eyes, severe stage  H40.1133 365.11      365.73       7. Bilateral hearing loss, unspecified hearing loss type  H91.93 389.9       8. Overweight (BMI 25.0-29.9)  E66.3 278.02       9. Postmenopausal  Z78.0 V49.81       10. Other screening mammogram  Z12.31 V76.12 Mammo Digital Screening Bilat w/ Luc          PLAN:  1. Age-appropriate counseling-appropriate low-sodium, low-cholesterol, low carbohydrate diet and exercise daily, monthly breast self exam, annual wellness examination.   2. Patient advised to call for results.  3. Continue current medications.  4. Keep follow up with specialists.  5. Annual eye and dental examinations advised.  6. Reassurance regarding above concerns as likely age-releated.  7. Follow up in about 4 months (around 2/6/2023) for hypertension and diabetes follow up.    40 minutes of total time spent on the encounter, which includes face to face time and non-face to face time preparing to see the patient (eg, review of tests), Obtaining and/or reviewing separately obtained history, Documenting clinical information in the electronic or other health record, Independently interpreting results (not separately reported) and communicating results to the patient/family/caregiver, or Care coordination (not separately reported).

## 2022-10-13 DIAGNOSIS — D64.9 ANEMIA, UNSPECIFIED TYPE: Primary | ICD-10-CM

## 2022-10-13 DIAGNOSIS — E03.9 HYPOTHYROIDISM, UNSPECIFIED TYPE: ICD-10-CM

## 2022-10-13 RX ORDER — LEVOTHYROXINE SODIUM 50 UG/1
TABLET ORAL
Qty: 90 TABLET | Refills: 3 | Status: SHIPPED | OUTPATIENT
Start: 2022-10-13 | End: 2023-10-09 | Stop reason: SDUPTHER

## 2022-10-13 NOTE — TELEPHONE ENCOUNTER
Refill Decision Note   Edie Hammond  is requesting a refill authorization.  Brief Assessment and Rationale for Refill:  Approve     Medication Therapy Plan:       Medication Reconciliation Completed: No   Comments:     No Care Gaps recommended.     Note composed:1:17 PM 10/13/2022

## 2022-10-13 NOTE — TELEPHONE ENCOUNTER
No new care gaps identified.  Health system Embedded Care Gaps. Reference number: [id number]. 10/13/2022   10:04:42 AM LUIS ALFREDOT

## 2022-10-14 ENCOUNTER — LAB VISIT (OUTPATIENT)
Dept: LAB | Facility: HOSPITAL | Age: 87
End: 2022-10-14
Attending: FAMILY MEDICINE
Payer: MEDICARE

## 2022-10-14 DIAGNOSIS — D64.9 ANEMIA, UNSPECIFIED TYPE: ICD-10-CM

## 2022-10-14 LAB
BASOPHILS # BLD AUTO: 0.06 K/UL (ref 0–0.2)
BASOPHILS NFR BLD: 0.6 % (ref 0–1.9)
DIFFERENTIAL METHOD: ABNORMAL
EOSINOPHIL # BLD AUTO: 0.2 K/UL (ref 0–0.5)
EOSINOPHIL NFR BLD: 1.5 % (ref 0–8)
ERYTHROCYTE [DISTWIDTH] IN BLOOD BY AUTOMATED COUNT: 15 % (ref 11.5–14.5)
FERRITIN SERPL-MCNC: 154 NG/ML (ref 20–300)
HCT VFR BLD AUTO: 28.9 % (ref 37–48.5)
HGB BLD-MCNC: 8.9 G/DL (ref 12–16)
IMM GRANULOCYTES # BLD AUTO: 0.03 K/UL (ref 0–0.04)
IMM GRANULOCYTES NFR BLD AUTO: 0.3 % (ref 0–0.5)
IRON SERPL-MCNC: 20 UG/DL (ref 30–160)
LYMPHOCYTES # BLD AUTO: 1.4 K/UL (ref 1–4.8)
LYMPHOCYTES NFR BLD: 13.8 % (ref 18–48)
MCH RBC QN AUTO: 28.2 PG (ref 27–31)
MCHC RBC AUTO-ENTMCNC: 30.8 G/DL (ref 32–36)
MCV RBC AUTO: 92 FL (ref 82–98)
MONOCYTES # BLD AUTO: 0.6 K/UL (ref 0.3–1)
MONOCYTES NFR BLD: 6.2 % (ref 4–15)
NEUTROPHILS # BLD AUTO: 7.8 K/UL (ref 1.8–7.7)
NEUTROPHILS NFR BLD: 77.6 % (ref 38–73)
NRBC BLD-RTO: 0 /100 WBC
PLATELET # BLD AUTO: 405 K/UL (ref 150–450)
PMV BLD AUTO: 10 FL (ref 9.2–12.9)
RBC # BLD AUTO: 3.16 M/UL (ref 4–5.4)
SATURATED IRON: 8 % (ref 20–50)
TOTAL IRON BINDING CAPACITY: 247 UG/DL (ref 250–450)
TRANSFERRIN SERPL-MCNC: 167 MG/DL (ref 200–375)
WBC # BLD AUTO: 10.07 K/UL (ref 3.9–12.7)

## 2022-10-14 PROCEDURE — 84466 ASSAY OF TRANSFERRIN: CPT | Performed by: FAMILY MEDICINE

## 2022-10-14 PROCEDURE — 85025 COMPLETE CBC W/AUTO DIFF WBC: CPT | Performed by: FAMILY MEDICINE

## 2022-10-14 PROCEDURE — 82728 ASSAY OF FERRITIN: CPT | Performed by: FAMILY MEDICINE

## 2022-10-14 PROCEDURE — 36415 COLL VENOUS BLD VENIPUNCTURE: CPT | Mod: PO | Performed by: FAMILY MEDICINE

## 2022-10-18 ENCOUNTER — TELEPHONE (OUTPATIENT)
Dept: FAMILY MEDICINE | Facility: CLINIC | Age: 87
End: 2022-10-18
Payer: MEDICARE

## 2022-10-18 DIAGNOSIS — D64.9 ANEMIA, UNSPECIFIED TYPE: Primary | ICD-10-CM

## 2022-10-18 NOTE — TELEPHONE ENCOUNTER
----- Message from Ayana Martin sent at 10/18/2022 12:12 PM CDT -----  Contact: pt  Pt states she received her lab results back on Friday and would like to speak regarding the Dr's orders due to her iron and hemoglobin begin. She wants to know the next steps to increase these values and start feeling better. Her contact is [phone]. Thanks DB

## 2022-10-18 NOTE — TELEPHONE ENCOUNTER
Advise pt lab results show:    Stable mild anemia compared to 1 week ago but dropped from 3 to 6 months ago and with low iron level despite normal iron storage level.  I would like her to see/consult with hematology specialist for further evaluation. Thanks.    I have put the following orders and/or medications to this note.  Please advise pt and assist.    Orders Placed This Encounter   Procedures    Ambulatory referral/consult to Hematology / Oncology     Standing Status:   Future     Standing Expiration Date:   11/18/2023     Referral Priority:   Routine     Referral Type:   Consultation     Referral Reason:   Specialty Services Required     Requested Specialty:   Hematology and Oncology     Number of Visits Requested:   1

## 2022-10-19 ENCOUNTER — TELEPHONE (OUTPATIENT)
Dept: HEMATOLOGY/ONCOLOGY | Facility: CLINIC | Age: 87
End: 2022-10-19
Payer: MEDICARE

## 2022-10-20 ENCOUNTER — TELEPHONE (OUTPATIENT)
Dept: FAMILY MEDICINE | Facility: CLINIC | Age: 87
End: 2022-10-20
Payer: MEDICARE

## 2022-10-20 ENCOUNTER — TELEPHONE (OUTPATIENT)
Dept: HEMATOLOGY/ONCOLOGY | Facility: CLINIC | Age: 87
End: 2022-10-20
Payer: MEDICARE

## 2022-10-20 NOTE — TELEPHONE ENCOUNTER
Advise pt she needs to see/consult with anemia specialist for further evaluation and recommendation as to if she needs to take medication for anemia. Thanks.

## 2022-10-20 NOTE — TELEPHONE ENCOUNTER
----- Message from Rebecca Sifuentes sent at 10/19/2022  3:50 PM CDT -----  Contact: dunia  Patient is calling to speak with the nurse regarding medication. Reports needing something for anemia. Please give patient a call back at .[phone]

## 2022-10-21 ENCOUNTER — TELEPHONE (OUTPATIENT)
Dept: HEMATOLOGY/ONCOLOGY | Facility: CLINIC | Age: 87
End: 2022-10-21
Payer: MEDICARE

## 2022-10-21 ENCOUNTER — LAB VISIT (OUTPATIENT)
Dept: LAB | Facility: HOSPITAL | Age: 87
End: 2022-10-21
Attending: FAMILY MEDICINE
Payer: MEDICARE

## 2022-10-21 ENCOUNTER — TELEPHONE (OUTPATIENT)
Dept: HEMATOLOGY/ONCOLOGY | Facility: CLINIC | Age: 87
End: 2022-10-21

## 2022-10-21 ENCOUNTER — OFFICE VISIT (OUTPATIENT)
Dept: HEMATOLOGY/ONCOLOGY | Facility: CLINIC | Age: 87
End: 2022-10-21
Payer: MEDICARE

## 2022-10-21 VITALS
HEART RATE: 62 BPM | OXYGEN SATURATION: 95 % | HEIGHT: 59 IN | WEIGHT: 133.63 LBS | SYSTOLIC BLOOD PRESSURE: 122 MMHG | TEMPERATURE: 98 F | RESPIRATION RATE: 16 BRPM | BODY MASS INDEX: 26.94 KG/M2 | DIASTOLIC BLOOD PRESSURE: 57 MMHG

## 2022-10-21 DIAGNOSIS — N18.32 STAGE 3B CHRONIC KIDNEY DISEASE: ICD-10-CM

## 2022-10-21 DIAGNOSIS — D64.9 NORMOCYTIC ANEMIA: Primary | ICD-10-CM

## 2022-10-21 DIAGNOSIS — D64.9 ANEMIA, UNSPECIFIED TYPE: ICD-10-CM

## 2022-10-21 DIAGNOSIS — D64.9 NORMOCYTIC ANEMIA: ICD-10-CM

## 2022-10-21 DIAGNOSIS — D53.9 NUTRITIONAL ANEMIA, UNSPECIFIED: ICD-10-CM

## 2022-10-21 DIAGNOSIS — I35.0 NONRHEUMATIC AORTIC VALVE STENOSIS: ICD-10-CM

## 2022-10-21 DIAGNOSIS — E66.3 OVERWEIGHT (BMI 25.0-29.9): ICD-10-CM

## 2022-10-21 DIAGNOSIS — E03.9 HYPOTHYROIDISM, UNSPECIFIED TYPE: ICD-10-CM

## 2022-10-21 LAB
ALBUMIN SERPL BCP-MCNC: 3 G/DL (ref 3.5–5.2)
ALP SERPL-CCNC: 60 U/L (ref 55–135)
ALT SERPL W/O P-5'-P-CCNC: 7 U/L (ref 10–44)
ANION GAP SERPL CALC-SCNC: 13 MMOL/L (ref 8–16)
AST SERPL-CCNC: 13 U/L (ref 10–40)
BASOPHILS # BLD AUTO: 0.09 K/UL (ref 0–0.2)
BASOPHILS NFR BLD: 0.8 % (ref 0–1.9)
BILIRUB SERPL-MCNC: 0.4 MG/DL (ref 0.1–1)
BUN SERPL-MCNC: 20 MG/DL (ref 10–30)
CALCIUM SERPL-MCNC: 9 MG/DL (ref 8.7–10.5)
CHLORIDE SERPL-SCNC: 98 MMOL/L (ref 95–110)
CO2 SERPL-SCNC: 25 MMOL/L (ref 23–29)
CREAT SERPL-MCNC: 1.1 MG/DL (ref 0.5–1.4)
CRP SERPL-MCNC: 92.7 MG/L (ref 0–8.2)
DIFFERENTIAL METHOD: ABNORMAL
EOSINOPHIL # BLD AUTO: 0.1 K/UL (ref 0–0.5)
EOSINOPHIL NFR BLD: 1.1 % (ref 0–8)
ERYTHROCYTE [DISTWIDTH] IN BLOOD BY AUTOMATED COUNT: 14.5 % (ref 11.5–14.5)
ERYTHROCYTE [SEDIMENTATION RATE] IN BLOOD BY PHOTOMETRIC METHOD: 56 MM/HR (ref 0–36)
EST. GFR  (NO RACE VARIABLE): 47 ML/MIN/1.73 M^2
GLUCOSE SERPL-MCNC: 115 MG/DL (ref 70–110)
HCT VFR BLD AUTO: 26 % (ref 37–48.5)
HGB BLD-MCNC: 8.7 G/DL (ref 12–16)
IMM GRANULOCYTES # BLD AUTO: 0.08 K/UL (ref 0–0.04)
IMM GRANULOCYTES NFR BLD AUTO: 0.7 % (ref 0–0.5)
LDH SERPL L TO P-CCNC: 174 U/L (ref 110–260)
LYMPHOCYTES # BLD AUTO: 1.3 K/UL (ref 1–4.8)
LYMPHOCYTES NFR BLD: 11.3 % (ref 18–48)
MCH RBC QN AUTO: 28.7 PG (ref 27–31)
MCHC RBC AUTO-ENTMCNC: 33.5 G/DL (ref 32–36)
MCV RBC AUTO: 86 FL (ref 82–98)
MONOCYTES # BLD AUTO: 0.9 K/UL (ref 0.3–1)
MONOCYTES NFR BLD: 8.3 % (ref 4–15)
NEUTROPHILS # BLD AUTO: 8.6 K/UL (ref 1.8–7.7)
NEUTROPHILS NFR BLD: 77.8 % (ref 38–73)
NRBC BLD-RTO: 0 /100 WBC
PATH REV BLD -IMP: NORMAL
PLATELET # BLD AUTO: 438 K/UL (ref 150–450)
PMV BLD AUTO: 9 FL (ref 9.2–12.9)
POTASSIUM SERPL-SCNC: 3.9 MMOL/L (ref 3.5–5.1)
PROT SERPL-MCNC: 7 G/DL (ref 6–8.4)
RBC # BLD AUTO: 3.03 M/UL (ref 4–5.4)
RETICS/RBC NFR AUTO: 1.2 % (ref 0.5–2.5)
SODIUM SERPL-SCNC: 136 MMOL/L (ref 136–145)
WBC # BLD AUTO: 11.06 K/UL (ref 3.9–12.7)

## 2022-10-21 PROCEDURE — 82746 ASSAY OF FOLIC ACID SERUM: CPT | Performed by: STUDENT IN AN ORGANIZED HEALTH CARE EDUCATION/TRAINING PROGRAM

## 2022-10-21 PROCEDURE — 85060 BLOOD SMEAR INTERPRETATION: CPT | Mod: ,,, | Performed by: PATHOLOGY

## 2022-10-21 PROCEDURE — 84238 ASSAY NONENDOCRINE RECEPTOR: CPT | Performed by: STUDENT IN AN ORGANIZED HEALTH CARE EDUCATION/TRAINING PROGRAM

## 2022-10-21 PROCEDURE — 36415 COLL VENOUS BLD VENIPUNCTURE: CPT | Performed by: STUDENT IN AN ORGANIZED HEALTH CARE EDUCATION/TRAINING PROGRAM

## 2022-10-21 PROCEDURE — 83615 LACTATE (LD) (LDH) ENZYME: CPT | Performed by: STUDENT IN AN ORGANIZED HEALTH CARE EDUCATION/TRAINING PROGRAM

## 2022-10-21 PROCEDURE — 99999 PR PBB SHADOW E&M-EST. PATIENT-LVL V: CPT | Mod: PBBFAC,,, | Performed by: STUDENT IN AN ORGANIZED HEALTH CARE EDUCATION/TRAINING PROGRAM

## 2022-10-21 PROCEDURE — 99999 PR PBB SHADOW E&M-EST. PATIENT-LVL V: ICD-10-PCS | Mod: PBBFAC,,, | Performed by: STUDENT IN AN ORGANIZED HEALTH CARE EDUCATION/TRAINING PROGRAM

## 2022-10-21 PROCEDURE — 3288F PR FALLS RISK ASSESSMENT DOCUMENTED: ICD-10-PCS | Mod: CPTII,S$GLB,, | Performed by: STUDENT IN AN ORGANIZED HEALTH CARE EDUCATION/TRAINING PROGRAM

## 2022-10-21 PROCEDURE — 82607 VITAMIN B-12: CPT | Performed by: STUDENT IN AN ORGANIZED HEALTH CARE EDUCATION/TRAINING PROGRAM

## 2022-10-21 PROCEDURE — 85060 PATHOLOGIST REVIEW: ICD-10-PCS | Mod: ,,, | Performed by: PATHOLOGY

## 2022-10-21 PROCEDURE — 1159F MED LIST DOCD IN RCRD: CPT | Mod: CPTII,S$GLB,, | Performed by: STUDENT IN AN ORGANIZED HEALTH CARE EDUCATION/TRAINING PROGRAM

## 2022-10-21 PROCEDURE — 80053 COMPREHEN METABOLIC PANEL: CPT | Performed by: STUDENT IN AN ORGANIZED HEALTH CARE EDUCATION/TRAINING PROGRAM

## 2022-10-21 PROCEDURE — 99205 OFFICE O/P NEW HI 60 MIN: CPT | Mod: S$GLB,,, | Performed by: STUDENT IN AN ORGANIZED HEALTH CARE EDUCATION/TRAINING PROGRAM

## 2022-10-21 PROCEDURE — 83010 ASSAY OF HAPTOGLOBIN QUANT: CPT | Performed by: STUDENT IN AN ORGANIZED HEALTH CARE EDUCATION/TRAINING PROGRAM

## 2022-10-21 PROCEDURE — 85025 COMPLETE CBC W/AUTO DIFF WBC: CPT | Performed by: STUDENT IN AN ORGANIZED HEALTH CARE EDUCATION/TRAINING PROGRAM

## 2022-10-21 PROCEDURE — 99205 PR OFFICE/OUTPT VISIT, NEW, LEVL V, 60-74 MIN: ICD-10-PCS | Mod: S$GLB,,, | Performed by: STUDENT IN AN ORGANIZED HEALTH CARE EDUCATION/TRAINING PROGRAM

## 2022-10-21 PROCEDURE — 82668 ASSAY OF ERYTHROPOIETIN: CPT | Performed by: STUDENT IN AN ORGANIZED HEALTH CARE EDUCATION/TRAINING PROGRAM

## 2022-10-21 PROCEDURE — 1159F PR MEDICATION LIST DOCUMENTED IN MEDICAL RECORD: ICD-10-PCS | Mod: CPTII,S$GLB,, | Performed by: STUDENT IN AN ORGANIZED HEALTH CARE EDUCATION/TRAINING PROGRAM

## 2022-10-21 PROCEDURE — 1101F PT FALLS ASSESS-DOCD LE1/YR: CPT | Mod: CPTII,S$GLB,, | Performed by: STUDENT IN AN ORGANIZED HEALTH CARE EDUCATION/TRAINING PROGRAM

## 2022-10-21 PROCEDURE — 85045 AUTOMATED RETICULOCYTE COUNT: CPT | Performed by: STUDENT IN AN ORGANIZED HEALTH CARE EDUCATION/TRAINING PROGRAM

## 2022-10-21 PROCEDURE — 85652 RBC SED RATE AUTOMATED: CPT | Performed by: STUDENT IN AN ORGANIZED HEALTH CARE EDUCATION/TRAINING PROGRAM

## 2022-10-21 PROCEDURE — 1126F PR PAIN SEVERITY QUANTIFIED, NO PAIN PRESENT: ICD-10-PCS | Mod: CPTII,S$GLB,, | Performed by: STUDENT IN AN ORGANIZED HEALTH CARE EDUCATION/TRAINING PROGRAM

## 2022-10-21 PROCEDURE — 3288F FALL RISK ASSESSMENT DOCD: CPT | Mod: CPTII,S$GLB,, | Performed by: STUDENT IN AN ORGANIZED HEALTH CARE EDUCATION/TRAINING PROGRAM

## 2022-10-21 PROCEDURE — 1101F PR PT FALLS ASSESS DOC 0-1 FALLS W/OUT INJ PAST YR: ICD-10-PCS | Mod: CPTII,S$GLB,, | Performed by: STUDENT IN AN ORGANIZED HEALTH CARE EDUCATION/TRAINING PROGRAM

## 2022-10-21 PROCEDURE — 1160F PR REVIEW ALL MEDS BY PRESCRIBER/CLIN PHARMACIST DOCUMENTED: ICD-10-PCS | Mod: CPTII,S$GLB,, | Performed by: STUDENT IN AN ORGANIZED HEALTH CARE EDUCATION/TRAINING PROGRAM

## 2022-10-21 PROCEDURE — 86140 C-REACTIVE PROTEIN: CPT | Performed by: STUDENT IN AN ORGANIZED HEALTH CARE EDUCATION/TRAINING PROGRAM

## 2022-10-21 PROCEDURE — 1160F RVW MEDS BY RX/DR IN RCRD: CPT | Mod: CPTII,S$GLB,, | Performed by: STUDENT IN AN ORGANIZED HEALTH CARE EDUCATION/TRAINING PROGRAM

## 2022-10-21 PROCEDURE — 1126F AMNT PAIN NOTED NONE PRSNT: CPT | Mod: CPTII,S$GLB,, | Performed by: STUDENT IN AN ORGANIZED HEALTH CARE EDUCATION/TRAINING PROGRAM

## 2022-10-21 RX ORDER — PREDNISONE 20 MG/1
20 TABLET ORAL DAILY
Qty: 5 TABLET | Refills: 0 | Status: SHIPPED | OUTPATIENT
Start: 2022-10-21 | End: 2022-10-26

## 2022-10-21 RX ORDER — FERROUS SULFATE 325(65) MG
325 TABLET ORAL DAILY
Qty: 30 TABLET | Refills: 0 | Status: SHIPPED | OUTPATIENT
Start: 2022-10-21 | End: 2022-11-17

## 2022-10-21 NOTE — TELEPHONE ENCOUNTER
I spoke to patient. I told her it have been sent already to [facility] on pharmacy, but we could cancel and re-route. She said never mind, that someone will pick it up from [facility] on.

## 2022-10-21 NOTE — PROGRESS NOTES
PATIENT: Edie Hammond  MRN: [id number]  DATE: 10/21/2022      Reason for consult:   Chief Complaint   Patient presents with    Anemia         Oncologic History:   Oncologic History    Oncologic History      Oncologic Treatment      Pathology           Subjective:   History of Present Illness: Ms. Edie Hammond is a 89 y.o. female who I'm asked to see for anemia    89 year old woman with multiple comorbidities presents with 2 months of lightheadedness and in the past few weeks has developed dyspnea with exertion.  She is concerned about her symptoms being related to anemia.  Denies epistaxis, hemoptysis, hematemesis, hematochezia, melena, or hematuria.  Denies use of OTC NSAIDs.  Has been seen by cardiology and has OSH Echo (report not available to me) and was told her symptoms were unlikely to be cardiac.  Has had intentional gradual weight loss.  No significant change in appetite.  No recent falls.  Friend accompanies her at this visit.    Past Medical History:   Past Medical History:   Diagnosis Date    Diabetes     Glaucoma     Follows with Dr. James Reeves, ophthalmologist    Hand arthritis     Heart murmur     Follows with Dr. Faisal Milton, cardiology    HTN (hypertension)     Hypothyroidism     Intracranial hemorrhage     Postmenopausal     Seasonal allergies     Type 2 diabetes mellitus        Past Surgical History:   Past Surgical History:   Procedure Laterality Date    bladder lift      BREAST BIOPSY      BUNIONECTOMY Bilateral     1970s    CATARACT EXTRACTION Bilateral     1999    CHOLECYSTECTOMY      CRANIOTOMY  2017    s/p fall    FOOT NEUROMA SURGERY Right 1982    R foot - neuroma removed    MYOMECTOMY      TOTAL ABDOMINAL HYSTERECTOMY W/ BILATERAL SALPINGOOPHORECTOMY      due to fibroid       Family History:   Family History   Problem Relation Age of Onset    No Known Problems Son     Heart attack Mother     Diabetes Sister     Hypertension Sister     Hypertension Sister     Breast  cancer Maternal Aunt     Stroke Neg Hx        Social History:  reports that she has never smoked. She has never used smokeless tobacco. She reports current alcohol use. She reports that she does not use drugs.    Allergies:  Review of patient's allergies indicates:   Allergen Reactions    Brimonidine tartrate Itching and Swelling    Losartan Anaphylaxis    Amlodipine besylate Edema     Other reaction(s): swelling of feet    Metformin hcl Diarrhea       Medications:  Current Outpatient Medications   Medication Sig Dispense Refill    albuterol (VENTOLIN HFA) 90 mcg/actuation inhaler Inhale 2 puffs into the lungs every 6 (six) hours as needed for Wheezing. Rescue 12 g 1    amLODIPine (NORVASC) 10 MG tablet Take 10 mg by mouth once daily.      blood sugar diagnostic (BLOOD GLUCOSE TEST) Strp Use twice daily prn. Dx: E11.9 300 each 3    bumetanide (BUMEX) 1 MG tablet But also states some times takes 3 times per week      cetirizine (ZYRTEC) 10 MG tablet Take 10 mg by mouth once daily.       cloNIDine (CATAPRES) 0.1 MG tablet Take 0.1 mg by mouth daily as needed (takes only if SBP > 180 per cardiologist).       dorzolamide-timolol 2-0.5% (COSOPT) 22.3-6.8 mg/mL ophthalmic solution 1 drop.      doxazosin (CARDURA) 1 MG tablet Take 1 mg by mouth nightly.      EPINEPHrine (EPIPEN) 0.3 mg/0.3 mL AtIn INJECT 0.3MLS INTO THE MUSCLE ONCE FOR 1 DOSE 2 each 0    hydrALAZINE (APRESOLINE) 100 MG tablet TAKE ONE TABLET BY MOUTH THREE TIMES DAILY 270 tablet 1    ipratropium (ATROVENT) 21 mcg (0.03 %) nasal spray 2 sprays by Nasal route 3 (three) times daily. 20 mL 5    JANUVIA 100 mg Tab TAKE ONE TABLET BY MOUTH ONCE DAILY 90 tablet 1    lancets Misc Use twice daily prn. Dx: E11.9 300 each 3    latanoprost 0.005 % ophthalmic solution SMARTSI Drop(s) In Eye(s) Every Evening      levothyroxine (SYNTHROID) 50 MCG tablet TAKE ONE TABLET BY MOUTH ONCE DAILY before breakfast 90 tablet 3    meclizine (ANTIVERT) 12.5 mg tablet Take 1  tablet (12.5 mg total) by mouth 3 (three) times daily as needed for Dizziness. 30 tablet 0    montelukast (SINGULAIR) 10 mg tablet TAKE ONE TABLET BY MOUTH IN THE EVENING 30 tablet 0    multivitamin with minerals tablet Take 1 tablet by mouth once daily.       nitroGLYCERIN (NITROSTAT) 0.4 MG SL tablet Place under the tongue.      nystatin (MYCOSTATIN) cream APPLY TOPICALLY TWICE DAILY AS NEEDED FOR IRRITATED  SKIN (Patient taking differently: APPLY TOPICALLY TWICE DAILY AS NEEDED FOR IRRITATED  SKIN) 30 g 3    blood-glucose meter kit Use as instructed - dx: E11.9 1 each 0    ferrous sulfate (FEOSOL) 325 mg (65 mg iron) Tab tablet Take 1 tablet (325 mg total) by mouth once daily. Take on empty stomach, at least 1 hour before food. 30 tablet 0     No current facility-administered medications for this visit.         ROS:  Review of Systems   Constitutional:  Positive for fatigue. Negative for appetite change, chills, diaphoresis, fever and unexpected weight change.   HENT:  Negative for nosebleeds and trouble swallowing.    Eyes:  Negative for visual disturbance.   Respiratory:  Positive for shortness of breath. Negative for cough, chest tightness and wheezing.    Cardiovascular:  Negative for chest pain and leg swelling.   Gastrointestinal:  Negative for abdominal distention, abdominal pain, blood in stool, constipation, diarrhea, nausea and vomiting.   Endocrine: Negative for cold intolerance and heat intolerance.   Genitourinary:  Negative for difficulty urinating and dysuria.   Musculoskeletal:  Negative for arthralgias and back pain.   Skin:  Negative for color change.   Neurological:  Positive for weakness and light-headedness. Negative for numbness and headaches.   Hematological:  Negative for adenopathy. Does not bruise/bleed easily.   Psychiatric/Behavioral:  Negative for confusion.        ECOG Performance Status:   ECOG SCORE    1 - Restricted in strenuous activity-ambulatory and able to carry out work of a  "light nature         Objective:      Vitals:   Vitals:    10/21/22 1049   BP: (!) 122/57   Pulse: 62   Resp: 16   Temp: 97.5 °F (36.4 °C)   TempSrc: Temporal   SpO2: 95%   Weight: 60.6 kg (133 lb 9.6 oz)   Height: 4' 11" (1.499 m)     BMI: Body mass index is 26.98 kg/m².    Physical Exam:  Physical Exam  Constitutional:       General: She is not in acute distress.     Appearance: Normal appearance. She is not ill-appearing.   HENT:      Head: Normocephalic and atraumatic.      Mouth/Throat:      Pharynx: No oropharyngeal exudate or posterior oropharyngeal erythema.   Eyes:      General: No scleral icterus.     Extraocular Movements: Extraocular movements intact.      Conjunctiva/sclera: Conjunctivae normal.      Pupils: Pupils are equal, round, and reactive to light.   Cardiovascular:      Rate and Rhythm: Regular rhythm. Bradycardia present.      Heart sounds: Murmur heard.     No friction rub. No gallop.   Pulmonary:      Effort: Pulmonary effort is normal. No respiratory distress.      Breath sounds: No stridor. No wheezing, rhonchi or rales.   Abdominal:      General: Bowel sounds are normal. There is no distension.      Palpations: Abdomen is soft. There is no mass.      Tenderness: There is no abdominal tenderness. There is no guarding or rebound.   Musculoskeletal:         General: Normal range of motion.      Cervical back: Normal range of motion and neck supple.      Right lower leg: No edema.      Left lower leg: No edema.   Skin:     General: Skin is warm and dry.   Neurological:      General: No focal deficit present.      Mental Status: She is alert.         Laboratory Data:  No results found for this or any previous visit (from the past 168 hour(s)).  Lab Results   Component Value Date    WBC 10.07 10/14/2022    HGB 8.9 (L) 10/14/2022    HCT 28.9 (L) 10/14/2022    MCV 92 10/14/2022     10/14/2022       Lab Results   Component Value Date    IRON 20 (L) 10/14/2022    TRANSFERRIN 167 (L) 10/14/2022 "    TIBC 247 (L) 10/14/2022    FESATURATED 8 (L) 10/14/2022      Lab Results   Component Value Date    FERRITIN 154 10/14/2022     BMP  Lab Results   Component Value Date     10/06/2022    K 4.4 10/06/2022    CL 98 10/06/2022    CO2 26 10/06/2022    BUN 20 10/06/2022    CREATININE 0.9 10/06/2022    CALCIUM 9.5 10/06/2022    ANIONGAP 12 10/06/2022    ESTGFRAFRICA >60.0 06/28/2022    EGFRNONAA >60.0 06/28/2022       Imaging:       Assessment:     1. Normocytic anemia    2. Anemia, unspecified type    3. Overweight (BMI 25.0-29.9)    4. Nutritional anemia, unspecified    5. Nonrheumatic aortic valve stenosis    6. Hypothyroidism, unspecified type          Plan:     Problem List Items Addressed This Visit          Cardiac/Vascular    Aortic stenosis    Current Assessment & Plan     Systolic murmur on exam. Lungs clear. No evidence of fluid overload. bp adequately controlled  -consider discontinuation of amlodipine with her symptoms of lightheadedness.            Oncology    Normocytic anemia - Primary    Current Assessment & Plan     2 months of lightheadedness followed by development of dyspnea more recently. No bleeding symptoms. Iron studies suggestive of anemia of chronic disease with concurrent iron deficiency although elevated ferritin is unexpected. Ddx inflammatory suppression, other concurrent vitamin deficiencies.  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  -empirically start her on oral iron  -continue multivitamin  -look for reversible causes  -check inflammatory markers and soluble transferrin receptor   -check smear  -if hg still downtrending today, recommend urgent endoscopy  --transfuse for hg <7 or if becomes hemodynamically unstable  -if all workup unrevealing, consider bone marrow biopsy to eval for MDS or other bone marrow disorder.         Relevant Medications    ferrous sulfate (FEOSOL) 325 mg (65 mg iron) Tab tablet    Other Relevant Orders    Vitamin B12     FOLATE    SOLUBLE TRANSFERRIN RECEPTOR    CBC Auto Differential    Pathologist Interpretation Differential    RETICULOCYTES    LACTATE DEHYDROGENASE    Comprehensive Metabolic Panel    Haptoglobin    Sedimentation rate    C-REACTIVE PROTEIN       Endocrine    Hypothyroidism    Current Assessment & Plan     Lab Results   Component Value Date    TSH 3.004 10/06/2022   tsh wnl.  -continue levothyroxine         Overweight (BMI 25.0-29.9)    Current Assessment & Plan     Wt Readings from Last 5 Encounters:   10/21/22 60.6 kg (133 lb 9.6 oz)   10/06/22 60.7 kg (133 lb 13.1 oz)   09/19/22 61.4 kg (135 lb 5.8 oz)   08/04/22 62.4 kg (137 lb 9.1 oz)   06/28/22 63 kg (138 lb 14.2 oz)   Body mass index is 26.98 kg/m².      Weight loss intentional.          Other Visit Diagnoses       Anemia, unspecified type        Nutritional anemia, unspecified        Relevant Orders    Vitamin B12    FOLATE            Guillermo Rai MD  Hematology Oncology

## 2022-10-21 NOTE — ASSESSMENT & PLAN NOTE
Systolic murmur on exam. Lungs clear. No evidence of fluid overload. bp adequately controlled  -consider discontinuation of amlodipine with her symptoms of lightheadedness.

## 2022-10-21 NOTE — TELEPHONE ENCOUNTER
I attempted to call patient at number left on message, no answer. I left a message on her voicemail. I reiterated Dr. Rai's message. Said she has very high CRP. Dr. Rai wants her to stop taking hydralazine and he will send a prescription for prednisone to her pharmacy in [place].  He wants her to repeat her labs in 2 weeks. Left the office number.

## 2022-10-21 NOTE — ASSESSMENT & PLAN NOTE
Wt Readings from Last 5 Encounters:   10/21/22 60.6 kg (133 lb 9.6 oz)   10/06/22 60.7 kg (133 lb 13.1 oz)   09/19/22 61.4 kg (135 lb 5.8 oz)   08/04/22 62.4 kg (137 lb 9.1 oz)   06/28/22 63 kg (138 lb 14.2 oz)   Body mass index is 26.98 kg/m².      Weight loss intentional.

## 2022-10-21 NOTE — TELEPHONE ENCOUNTER
Tried calling mobile phone; voice mailbox full  Called home phone and left voicemail  She has very high CRP. Instructed her to stop taking hydralazine and that I will send a prescription for prednisone to [facility] in [place].  Told her I want to repeat her labs in 2 weeks.      Guillermo Rai MD  Hematology Oncology

## 2022-10-21 NOTE — TELEPHONE ENCOUNTER
"----- Message from Carol Leborn sent at 10/21/2022  2:22 PM CDT -----  Regarding: Prescription transfer  Consult/Advisory:           Name Of Caller: self    Contact Preference?: [phone]    What is the nature of the call?: inquiring if prescription can be sent to [facility]           Additional Notes:  "Thank you for all that you do for our patients'"     "

## 2022-10-21 NOTE — Clinical Note
When to follow up: to be determined Labs needed: today cmp cbc ldh retic esr crp, vitamin b12, folate, soluble transferrin receptor, haptoglobin, path interp Provider: Cecelia

## 2022-10-21 NOTE — TELEPHONE ENCOUNTER
----- Message from Melissa Batista sent at 10/21/2022  1:50 PM CDT -----  Contact: Edie  Type:  Patient Returning Call    Who Called:Edie  Who Left Message for Patient:unknown  Does the patient know what this is regarding?:unknown  Would the patient rather a call back or a response via [facility]? call  Best Call Back Number:[phone]  Additional Information: Patient reports missing a call and request a voice mail message is left on land line phone.  Thank you,  GH

## 2022-10-21 NOTE — ASSESSMENT & PLAN NOTE
2 months of lightheadedness followed by development of dyspnea more recently. No bleeding symptoms. Iron studies suggestive of anemia of chronic disease with concurrent iron deficiency although elevated ferritin is unexpected. Ddx inflammatory suppression, other concurrent vitamin deficiencies.  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  -empirically start her on oral iron  -continue multivitamin  -look for reversible causes  -check inflammatory markers and soluble transferrin receptor   -check smear  -if hg still downtrending today, recommend urgent endoscopy  --transfuse for hg <7 or if becomes hemodynamically unstable  -if all workup unrevealing, consider bone marrow biopsy to eval for MDS or other bone marrow disorder.

## 2022-10-22 LAB
FOLATE SERPL-MCNC: 32.7 NG/ML (ref 4–24)
HAPTOGLOB SERPL-MCNC: 443 MG/DL (ref 30–250)
VIT B12 SERPL-MCNC: 1235 PG/ML (ref 210–950)

## 2022-10-24 LAB
EPO SERPL-ACNC: 25.6 MIU/ML (ref 2.6–18.5)
PATH REV BLD -IMP: NORMAL
STFR SERPL-MCNC: 3.1 MG/L (ref 1.8–4.6)

## 2022-11-01 ENCOUNTER — TELEPHONE (OUTPATIENT)
Dept: FAMILY MEDICINE | Facility: CLINIC | Age: 87
End: 2022-11-01
Payer: MEDICARE

## 2022-11-01 ENCOUNTER — PATIENT MESSAGE (OUTPATIENT)
Dept: FAMILY MEDICINE | Facility: CLINIC | Age: 87
End: 2022-11-01
Payer: MEDICARE

## 2022-11-01 NOTE — TELEPHONE ENCOUNTER
"Pt portal message    "Dear Dr. Gary:  Living alone without assistance has become increasingly challenging  with recent health issues.  I  have  decided to move to a group home community where daily living  will be facilitated, with ability  to get medical services as needed.  I plan to move in early January, which will break my lease six months early, with  a penalty of $1800.00.  The  penalty might be waived with a letter from my doctor indicating a medical  necessity for the move.       I write to ask you to write such a letter, needed by November 20, and addressed to Livia Saenz, Manager, [facility]. I will be happy to provide additional information.  Thank you in advance for your consideration.        Edie Hammond    "    Please advise    "

## 2022-11-04 ENCOUNTER — PATIENT MESSAGE (OUTPATIENT)
Dept: HEMATOLOGY/ONCOLOGY | Facility: CLINIC | Age: 87
End: 2022-11-04
Payer: MEDICARE

## 2022-11-04 ENCOUNTER — LAB VISIT (OUTPATIENT)
Dept: LAB | Facility: HOSPITAL | Age: 87
End: 2022-11-04
Attending: FAMILY MEDICINE
Payer: MEDICARE

## 2022-11-04 DIAGNOSIS — D64.9 NORMOCYTIC ANEMIA: ICD-10-CM

## 2022-11-04 DIAGNOSIS — D64.9 NORMOCYTIC ANEMIA: Primary | ICD-10-CM

## 2022-11-04 LAB
BASOPHILS # BLD AUTO: 0.07 K/UL (ref 0–0.2)
BASOPHILS NFR BLD: 0.7 % (ref 0–1.9)
DIFFERENTIAL METHOD: ABNORMAL
EOSINOPHIL # BLD AUTO: 0.2 K/UL (ref 0–0.5)
EOSINOPHIL NFR BLD: 1.8 % (ref 0–8)
ERYTHROCYTE [DISTWIDTH] IN BLOOD BY AUTOMATED COUNT: 15.4 % (ref 11.5–14.5)
HCT VFR BLD AUTO: 25.7 % (ref 37–48.5)
HGB BLD-MCNC: 8.3 G/DL (ref 12–16)
IMM GRANULOCYTES # BLD AUTO: 0.02 K/UL (ref 0–0.04)
IMM GRANULOCYTES NFR BLD AUTO: 0.2 % (ref 0–0.5)
LYMPHOCYTES # BLD AUTO: 1.7 K/UL (ref 1–4.8)
LYMPHOCYTES NFR BLD: 18.2 % (ref 18–48)
MCH RBC QN AUTO: 28.1 PG (ref 27–31)
MCHC RBC AUTO-ENTMCNC: 32.3 G/DL (ref 32–36)
MCV RBC AUTO: 87 FL (ref 82–98)
MONOCYTES # BLD AUTO: 0.7 K/UL (ref 0.3–1)
MONOCYTES NFR BLD: 7.4 % (ref 4–15)
NEUTROPHILS # BLD AUTO: 6.8 K/UL (ref 1.8–7.7)
NEUTROPHILS NFR BLD: 71.7 % (ref 38–73)
NRBC BLD-RTO: 0 /100 WBC
PLATELET # BLD AUTO: 369 K/UL (ref 150–450)
PMV BLD AUTO: 9.4 FL (ref 9.2–12.9)
RBC # BLD AUTO: 2.95 M/UL (ref 4–5.4)
WBC # BLD AUTO: 9.44 K/UL (ref 3.9–12.7)

## 2022-11-04 PROCEDURE — 85025 COMPLETE CBC W/AUTO DIFF WBC: CPT | Performed by: STUDENT IN AN ORGANIZED HEALTH CARE EDUCATION/TRAINING PROGRAM

## 2022-11-04 PROCEDURE — 36415 COLL VENOUS BLD VENIPUNCTURE: CPT | Performed by: STUDENT IN AN ORGANIZED HEALTH CARE EDUCATION/TRAINING PROGRAM

## 2022-11-09 ENCOUNTER — TELEPHONE (OUTPATIENT)
Dept: FAMILY MEDICINE | Facility: CLINIC | Age: 87
End: 2022-11-09
Payer: MEDICARE

## 2022-11-09 NOTE — TELEPHONE ENCOUNTER
PT. Wanted you to reference message sent on 11/1. She states she hopes you can write the letter for stating its medically necessary for her to move to assisted living so she can get out of her current apartment lease.

## 2022-11-09 NOTE — TELEPHONE ENCOUNTER
----- Message from Jody Ren sent at 11/9/2022  2:54 PM CST -----  Contact: Edie Irizarry is calling in regards to getting a medical letter to be put in a long-term home. Please call her back at [phone]       Thanks   CF

## 2022-11-21 ENCOUNTER — TELEPHONE (OUTPATIENT)
Dept: FAMILY MEDICINE | Facility: CLINIC | Age: 87
End: 2022-11-21
Payer: MEDICARE

## 2022-11-21 NOTE — TELEPHONE ENCOUNTER
Pt is requesting a letter. Pt Sent message via portal. Message previously sent to you by other staff. Please advise

## 2022-11-21 NOTE — TELEPHONE ENCOUNTER
----- Message from Renee Beltran sent at 11/21/2022 10:39 AM CST -----  Patient called in regards to a letter that she requested,Please call back at [phone].Thanks

## 2022-11-21 NOTE — TELEPHONE ENCOUNTER
----- Message from Renee Beltran sent at 11/21/2022 10:39 AM CST -----  Patient called in regards to a letter that she requested,Please call back at [phone].Thanks

## 2022-11-22 ENCOUNTER — TELEPHONE (OUTPATIENT)
Dept: FAMILY MEDICINE | Facility: CLINIC | Age: 87
End: 2022-11-22
Payer: MEDICARE

## 2022-11-22 ENCOUNTER — PATIENT MESSAGE (OUTPATIENT)
Dept: FAMILY MEDICINE | Facility: CLINIC | Age: 87
End: 2022-11-22
Payer: MEDICARE

## 2022-11-22 NOTE — TELEPHONE ENCOUNTER
"Pt portal message    " Dear Dr. Gary:  Please accept my deepest appreciation for taking the time to respond to my request amid  your busy schedule. Many thanks!  Happy Thanksgiving and joyous holidays!  Sincerely,  Edie Hammond  "        "

## 2022-11-25 ENCOUNTER — LAB VISIT (OUTPATIENT)
Dept: LAB | Facility: HOSPITAL | Age: 87
End: 2022-11-25
Attending: STUDENT IN AN ORGANIZED HEALTH CARE EDUCATION/TRAINING PROGRAM
Payer: MEDICARE

## 2022-11-25 DIAGNOSIS — D64.9 NORMOCYTIC ANEMIA: ICD-10-CM

## 2022-11-25 LAB
BASOPHILS # BLD AUTO: 0.11 K/UL (ref 0–0.2)
BASOPHILS NFR BLD: 1 % (ref 0–1.9)
DIFFERENTIAL METHOD: ABNORMAL
EOSINOPHIL # BLD AUTO: 0.2 K/UL (ref 0–0.5)
EOSINOPHIL NFR BLD: 2.2 % (ref 0–8)
ERYTHROCYTE [DISTWIDTH] IN BLOOD BY AUTOMATED COUNT: 16.1 % (ref 11.5–14.5)
HCT VFR BLD AUTO: 28.6 % (ref 37–48.5)
HGB BLD-MCNC: 9.3 G/DL (ref 12–16)
IMM GRANULOCYTES # BLD AUTO: 0.05 K/UL (ref 0–0.04)
IMM GRANULOCYTES NFR BLD AUTO: 0.5 % (ref 0–0.5)
LYMPHOCYTES # BLD AUTO: 2 K/UL (ref 1–4.8)
LYMPHOCYTES NFR BLD: 18.1 % (ref 18–48)
MCH RBC QN AUTO: 28.7 PG (ref 27–31)
MCHC RBC AUTO-ENTMCNC: 32.5 G/DL (ref 32–36)
MCV RBC AUTO: 88 FL (ref 82–98)
MONOCYTES # BLD AUTO: 1 K/UL (ref 0.3–1)
MONOCYTES NFR BLD: 8.8 % (ref 4–15)
NEUTROPHILS # BLD AUTO: 7.7 K/UL (ref 1.8–7.7)
NEUTROPHILS NFR BLD: 69.4 % (ref 38–73)
NRBC BLD-RTO: 0 /100 WBC
PLATELET # BLD AUTO: 388 K/UL (ref 150–450)
PMV BLD AUTO: 9.2 FL (ref 9.2–12.9)
RBC # BLD AUTO: 3.24 M/UL (ref 4–5.4)
WBC # BLD AUTO: 11.03 K/UL (ref 3.9–12.7)

## 2022-11-25 PROCEDURE — 36415 COLL VENOUS BLD VENIPUNCTURE: CPT | Performed by: STUDENT IN AN ORGANIZED HEALTH CARE EDUCATION/TRAINING PROGRAM

## 2022-11-25 PROCEDURE — 85025 COMPLETE CBC W/AUTO DIFF WBC: CPT | Performed by: STUDENT IN AN ORGANIZED HEALTH CARE EDUCATION/TRAINING PROGRAM

## 2022-11-28 DIAGNOSIS — D64.9 NORMOCYTIC ANEMIA: Primary | ICD-10-CM

## 2022-12-12 ENCOUNTER — PATIENT MESSAGE (OUTPATIENT)
Dept: FAMILY MEDICINE | Facility: CLINIC | Age: 87
End: 2022-12-12
Payer: MEDICARE

## 2022-12-29 ENCOUNTER — OFFICE VISIT (OUTPATIENT)
Dept: HEMATOLOGY/ONCOLOGY | Facility: CLINIC | Age: 87
End: 2022-12-29
Payer: MEDICARE

## 2022-12-29 ENCOUNTER — LAB VISIT (OUTPATIENT)
Dept: LAB | Facility: HOSPITAL | Age: 87
End: 2022-12-29
Attending: STUDENT IN AN ORGANIZED HEALTH CARE EDUCATION/TRAINING PROGRAM
Payer: MEDICARE

## 2022-12-29 VITALS
HEART RATE: 62 BPM | OXYGEN SATURATION: 98 % | HEIGHT: 59 IN | RESPIRATION RATE: 20 BRPM | BODY MASS INDEX: 26.8 KG/M2 | SYSTOLIC BLOOD PRESSURE: 179 MMHG | TEMPERATURE: 98 F | WEIGHT: 132.94 LBS | DIASTOLIC BLOOD PRESSURE: 62 MMHG

## 2022-12-29 DIAGNOSIS — D64.9 NORMOCYTIC ANEMIA: ICD-10-CM

## 2022-12-29 DIAGNOSIS — D64.9 NORMOCYTIC ANEMIA: Primary | ICD-10-CM

## 2022-12-29 LAB
BASOPHILS # BLD AUTO: 0.1 K/UL (ref 0–0.2)
BASOPHILS NFR BLD: 1 % (ref 0–1.9)
DIFFERENTIAL METHOD: ABNORMAL
EOSINOPHIL # BLD AUTO: 0.3 K/UL (ref 0–0.5)
EOSINOPHIL NFR BLD: 3 % (ref 0–8)
ERYTHROCYTE [DISTWIDTH] IN BLOOD BY AUTOMATED COUNT: 16.4 % (ref 11.5–14.5)
HCT VFR BLD AUTO: 29.9 % (ref 37–48.5)
HGB BLD-MCNC: 10 G/DL (ref 12–16)
IMM GRANULOCYTES # BLD AUTO: 0.04 K/UL (ref 0–0.04)
IMM GRANULOCYTES NFR BLD AUTO: 0.4 % (ref 0–0.5)
LYMPHOCYTES # BLD AUTO: 2.1 K/UL (ref 1–4.8)
LYMPHOCYTES NFR BLD: 20.2 % (ref 18–48)
MCH RBC QN AUTO: 29.2 PG (ref 27–31)
MCHC RBC AUTO-ENTMCNC: 33.4 G/DL (ref 32–36)
MCV RBC AUTO: 87 FL (ref 82–98)
MONOCYTES # BLD AUTO: 1 K/UL (ref 0.3–1)
MONOCYTES NFR BLD: 9.7 % (ref 4–15)
NEUTROPHILS # BLD AUTO: 6.7 K/UL (ref 1.8–7.7)
NEUTROPHILS NFR BLD: 65.7 % (ref 38–73)
NRBC BLD-RTO: 0 /100 WBC
PLATELET # BLD AUTO: 328 K/UL (ref 150–450)
PMV BLD AUTO: 9.5 FL (ref 9.2–12.9)
RBC # BLD AUTO: 3.43 M/UL (ref 4–5.4)
WBC # BLD AUTO: 10.24 K/UL (ref 3.9–12.7)

## 2022-12-29 PROCEDURE — 85025 COMPLETE CBC W/AUTO DIFF WBC: CPT | Performed by: STUDENT IN AN ORGANIZED HEALTH CARE EDUCATION/TRAINING PROGRAM

## 2022-12-29 PROCEDURE — 36415 COLL VENOUS BLD VENIPUNCTURE: CPT | Performed by: STUDENT IN AN ORGANIZED HEALTH CARE EDUCATION/TRAINING PROGRAM

## 2022-12-29 PROCEDURE — 99999 PR PBB SHADOW E&M-EST. PATIENT-LVL V: CPT | Mod: PBBFAC,,, | Performed by: STUDENT IN AN ORGANIZED HEALTH CARE EDUCATION/TRAINING PROGRAM

## 2022-12-29 PROCEDURE — 1159F MED LIST DOCD IN RCRD: CPT | Mod: CPTII,S$GLB,, | Performed by: STUDENT IN AN ORGANIZED HEALTH CARE EDUCATION/TRAINING PROGRAM

## 2022-12-29 PROCEDURE — 3288F FALL RISK ASSESSMENT DOCD: CPT | Mod: CPTII,S$GLB,, | Performed by: STUDENT IN AN ORGANIZED HEALTH CARE EDUCATION/TRAINING PROGRAM

## 2022-12-29 PROCEDURE — 1101F PT FALLS ASSESS-DOCD LE1/YR: CPT | Mod: CPTII,S$GLB,, | Performed by: STUDENT IN AN ORGANIZED HEALTH CARE EDUCATION/TRAINING PROGRAM

## 2022-12-29 PROCEDURE — 3288F PR FALLS RISK ASSESSMENT DOCUMENTED: ICD-10-PCS | Mod: CPTII,S$GLB,, | Performed by: STUDENT IN AN ORGANIZED HEALTH CARE EDUCATION/TRAINING PROGRAM

## 2022-12-29 PROCEDURE — 99999 PR PBB SHADOW E&M-EST. PATIENT-LVL V: ICD-10-PCS | Mod: PBBFAC,,, | Performed by: STUDENT IN AN ORGANIZED HEALTH CARE EDUCATION/TRAINING PROGRAM

## 2022-12-29 PROCEDURE — 99214 OFFICE O/P EST MOD 30 MIN: CPT | Mod: S$GLB,,, | Performed by: STUDENT IN AN ORGANIZED HEALTH CARE EDUCATION/TRAINING PROGRAM

## 2022-12-29 PROCEDURE — 99214 PR OFFICE/OUTPT VISIT, EST, LEVL IV, 30-39 MIN: ICD-10-PCS | Mod: S$GLB,,, | Performed by: STUDENT IN AN ORGANIZED HEALTH CARE EDUCATION/TRAINING PROGRAM

## 2022-12-29 PROCEDURE — 1101F PR PT FALLS ASSESS DOC 0-1 FALLS W/OUT INJ PAST YR: ICD-10-PCS | Mod: CPTII,S$GLB,, | Performed by: STUDENT IN AN ORGANIZED HEALTH CARE EDUCATION/TRAINING PROGRAM

## 2022-12-29 PROCEDURE — 1160F RVW MEDS BY RX/DR IN RCRD: CPT | Mod: CPTII,S$GLB,, | Performed by: STUDENT IN AN ORGANIZED HEALTH CARE EDUCATION/TRAINING PROGRAM

## 2022-12-29 PROCEDURE — 1126F AMNT PAIN NOTED NONE PRSNT: CPT | Mod: CPTII,S$GLB,, | Performed by: STUDENT IN AN ORGANIZED HEALTH CARE EDUCATION/TRAINING PROGRAM

## 2022-12-29 PROCEDURE — 1126F PR PAIN SEVERITY QUANTIFIED, NO PAIN PRESENT: ICD-10-PCS | Mod: CPTII,S$GLB,, | Performed by: STUDENT IN AN ORGANIZED HEALTH CARE EDUCATION/TRAINING PROGRAM

## 2022-12-29 PROCEDURE — 1159F PR MEDICATION LIST DOCUMENTED IN MEDICAL RECORD: ICD-10-PCS | Mod: CPTII,S$GLB,, | Performed by: STUDENT IN AN ORGANIZED HEALTH CARE EDUCATION/TRAINING PROGRAM

## 2022-12-29 PROCEDURE — 1160F PR REVIEW ALL MEDS BY PRESCRIBER/CLIN PHARMACIST DOCUMENTED: ICD-10-PCS | Mod: CPTII,S$GLB,, | Performed by: STUDENT IN AN ORGANIZED HEALTH CARE EDUCATION/TRAINING PROGRAM

## 2022-12-29 RX ORDER — FERROUS SULFATE 325(65) MG
325 TABLET ORAL DAILY
Qty: 90 TABLET | Refills: 3 | Status: SHIPPED | OUTPATIENT
Start: 2022-12-29 | End: 2024-01-12

## 2022-12-29 NOTE — ASSESSMENT & PLAN NOTE
Downtrended then improved back to 10.  Feeling better after starting oral iron but still has intermittent fatigue. No longer has night sweats.  Stopped hydralazine initially then restarted about 1 week ago.  -continue oral iron supplementation indefinitely  --elevated ferritin is reactive with her known ESR/CRP elevation  -repeat labs CBC and follow up in 4 months  -discussed with her that if her anemia worsens (ie. Hg trend below 8) or if she develops new cytopenias, would need to consider bone marrow biopsy

## 2022-12-29 NOTE — PROGRESS NOTES
PATIENT: Edie Hammond  MRN: [id number]  DATE: 12/29/2022      Reason for consult:   Chief Complaint   Patient presents with    Anemia         Oncologic History:   Oncologic History    Oncologic History      Oncologic Treatment      Pathology           Subjective:   History of Present Illness: Ms. Edie Hammond is a 89 y.o. female who I'm asked to see for anemia    89 year old woman with multiple comorbidities presents for follow up.  Since her last visit, she did start oral iron and stopped hydralazine up until about a week ago (restarted by cardiology).  She feels better with the oral iron and does not have lightheadedness anymore.  Still feels fatigued but overall feels better.   No side effects with oral iron.  Has been seen by cardiology and has OSH Echo (report not available to me) and was told her symptoms were unlikely to be cardiac.  Friend accompanies her at this visit.    Past Medical History:   Past Medical History:   Diagnosis Date    Diabetes     Glaucoma     Follows with Dr. James Reeves, ophthalmologist    Hand arthritis     Heart murmur     Follows with Dr. Faisal Milton, cardiology    HTN (hypertension)     Hypothyroidism     Intracranial hemorrhage     Postmenopausal     Seasonal allergies     Type 2 diabetes mellitus        Past Surgical History:   Past Surgical History:   Procedure Laterality Date    bladder lift      BREAST BIOPSY      BUNIONECTOMY Bilateral     1970s    CATARACT EXTRACTION Bilateral     1999    CHOLECYSTECTOMY      CRANIOTOMY  2017    s/p fall    FOOT NEUROMA SURGERY Right 1982    R foot - neuroma removed    MYOMECTOMY      TOTAL ABDOMINAL HYSTERECTOMY W/ BILATERAL SALPINGOOPHORECTOMY      due to fibroid       Family History:   Family History   Problem Relation Age of Onset    No Known Problems Son     Heart attack Mother     Diabetes Sister     Hypertension Sister     Hypertension Sister     Breast cancer Maternal Aunt     Stroke Neg Hx        Social History:   reports that she has never smoked. She has never used smokeless tobacco. She reports current alcohol use. She reports that she does not use drugs.    Allergies:  Review of patient's allergies indicates:   Allergen Reactions    Brimonidine tartrate Itching and Swelling    Losartan Anaphylaxis    Amlodipine besylate Edema     Other reaction(s): swelling of feet    Metformin hcl Diarrhea       Medications:  Current Outpatient Medications   Medication Sig Dispense Refill    amLODIPine (NORVASC) 10 MG tablet Take 10 mg by mouth once daily.      blood sugar diagnostic (BLOOD GLUCOSE TEST) Strp Use twice daily prn. Dx: E11.9 300 each 3    bumetanide (BUMEX) 1 MG tablet But also states some times takes 3 times per week      cetirizine (ZYRTEC) 10 MG tablet Take 10 mg by mouth once daily.       cloNIDine (CATAPRES) 0.1 MG tablet Take 0.1 mg by mouth daily as needed (takes only if SBP > 180 per cardiologist).       dorzolamide-timolol 2-0.5% (COSOPT) 22.3-6.8 mg/mL ophthalmic solution 1 drop.      doxazosin (CARDURA) 1 MG tablet Take 1 mg by mouth nightly.      EPINEPHrine (EPIPEN) 0.3 mg/0.3 mL AtIn INJECT 0.3MLS INTO THE MUSCLE ONCE FOR 1 DOSE 2 each 0    ipratropium (ATROVENT) 21 mcg (0.03 %) nasal spray 2 sprays by Nasal route 3 (three) times daily. 20 mL 5    JANUVIA 100 mg Tab TAKE ONE TABLET BY MOUTH ONCE DAILY 90 tablet 1    lancets Misc Use twice daily prn. Dx: E11.9 300 each 3    latanoprost 0.005 % ophthalmic solution SMARTSI Drop(s) In Eye(s) Every Evening      levothyroxine (SYNTHROID) 50 MCG tablet TAKE ONE TABLET BY MOUTH ONCE DAILY before breakfast 90 tablet 3    meclizine (ANTIVERT) 12.5 mg tablet Take 1 tablet (12.5 mg total) by mouth 3 (three) times daily as needed for Dizziness. 30 tablet 0    montelukast (SINGULAIR) 10 mg tablet TAKE ONE TABLET BY MOUTH IN THE EVENING 30 tablet 0    multivitamin with minerals tablet Take 1 tablet by mouth once daily.       nitroGLYCERIN (NITROSTAT) 0.4 MG SL tablet  "Place under the tongue.      nystatin (MYCOSTATIN) cream APPLY TOPICALLY TWICE DAILY AS NEEDED FOR IRRITATED  SKIN (Patient taking differently: APPLY TOPICALLY TWICE DAILY AS NEEDED FOR IRRITATED  SKIN) 30 g 3    blood-glucose meter kit Use as instructed - dx: E11.9 1 each 0    ferrous sulfate (FEROSUL) 325 mg (65 mg iron) Tab tablet Take 1 tablet (325 mg total) by mouth once daily. 90 tablet 3     No current facility-administered medications for this visit.         ROS:  Review of Systems   Constitutional:  Positive for fatigue. Negative for appetite change, chills, diaphoresis, fever and unexpected weight change.   HENT:  Negative for nosebleeds and trouble swallowing.    Eyes:  Negative for visual disturbance.   Respiratory:  Negative for cough, chest tightness, shortness of breath and wheezing.    Cardiovascular:  Negative for chest pain and leg swelling.   Gastrointestinal:  Negative for abdominal distention, abdominal pain, blood in stool, constipation, diarrhea, nausea and vomiting.   Endocrine: Negative for cold intolerance and heat intolerance.   Genitourinary:  Negative for difficulty urinating and dysuria.   Musculoskeletal:  Negative for arthralgias and back pain.   Skin:  Negative for color change.   Neurological:  Positive for weakness. Negative for light-headedness, numbness and headaches.   Hematological:  Negative for adenopathy. Does not bruise/bleed easily.   Psychiatric/Behavioral:  Negative for confusion.        ECOG Performance Status:   ECOG SCORE    1 - Restricted in strenuous activity-ambulatory and able to carry out work of a light nature         Objective:      Vitals:   Vitals:    12/29/22 1248   BP: (!) 179/62   Pulse: 62   Resp: 20   Temp: 98 °F (36.7 °C)   TempSrc: Temporal   SpO2: 98%   Weight: 60.3 kg (132 lb 15 oz)   Height: 4' 11" (1.499 m)     BMI: Body mass index is 26.85 kg/m².  Wt Readings from Last 3 Encounters:   12/29/22 60.3 kg (132 lb 15 oz)   10/21/22 60.6 kg (133 lb 9.6 " oz)   10/06/22 60.7 kg (133 lb 13.1 oz)         Physical Exam:  Physical Exam  Constitutional:       General: She is not in acute distress.     Appearance: Normal appearance. She is not ill-appearing.   HENT:      Head: Normocephalic and atraumatic.      Mouth/Throat:      Pharynx: No oropharyngeal exudate or posterior oropharyngeal erythema.   Eyes:      General: No scleral icterus.     Extraocular Movements: Extraocular movements intact.      Conjunctiva/sclera: Conjunctivae normal.      Pupils: Pupils are equal, round, and reactive to light.   Cardiovascular:      Rate and Rhythm: Regular rhythm. Bradycardia present.      Heart sounds: Murmur heard.     No friction rub. No gallop.   Pulmonary:      Effort: Pulmonary effort is normal. No respiratory distress.      Breath sounds: No stridor. No wheezing, rhonchi or rales.   Abdominal:      General: Bowel sounds are normal. There is no distension.      Palpations: Abdomen is soft. There is no mass.      Tenderness: There is no abdominal tenderness. There is no guarding or rebound.   Musculoskeletal:         General: Normal range of motion.      Cervical back: Normal range of motion and neck supple.      Right lower leg: No edema.      Left lower leg: Edema (1+) present.   Skin:     General: Skin is warm and dry.   Neurological:      General: No focal deficit present.      Mental Status: She is alert.         Laboratory Data:  Recent Results (from the past 168 hour(s))   CBC Auto Differential    Collection Time: 12/29/22 12:18 PM   Result Value Ref Range    WBC 10.24 3.90 - 12.70 K/uL    RBC 3.43 (L) 4.00 - 5.40 M/uL    Hemoglobin 10.0 (L) 12.0 - 16.0 g/dL    Hematocrit 29.9 (L) 37.0 - 48.5 %    MCV 87 82 - 98 fL    MCH 29.2 27.0 - 31.0 pg    MCHC 33.4 32.0 - 36.0 g/dL    RDW 16.4 (H) 11.5 - 14.5 %    Platelets 328 150 - 450 K/uL    MPV 9.5 9.2 - 12.9 fL    Immature Granulocytes 0.4 0.0 - 0.5 %    Gran # (ANC) 6.7 1.8 - 7.7 K/uL    Immature Grans (Abs) 0.04 0.00 -  0.04 K/uL    Lymph # 2.1 1.0 - 4.8 K/uL    Mono # 1.0 0.3 - 1.0 K/uL    Eos # 0.3 0.0 - 0.5 K/uL    Baso # 0.10 0.00 - 0.20 K/uL    nRBC 0 0 /100 WBC    Gran % 65.7 38.0 - 73.0 %    Lymph % 20.2 18.0 - 48.0 %    Mono % 9.7 4.0 - 15.0 %    Eosinophil % 3.0 0.0 - 8.0 %    Basophil % 1.0 0.0 - 1.9 %    Differential Method Automated      Lab Results   Component Value Date    WBC 10.24 12/29/2022    HGB 10.0 (L) 12/29/2022    HCT 29.9 (L) 12/29/2022    MCV 87 12/29/2022     12/29/2022       Lab Results   Component Value Date    IRON 20 (L) 10/14/2022    TRANSFERRIN 167 (L) 10/14/2022    TIBC 247 (L) 10/14/2022    FESATURATED 8 (L) 10/14/2022      Lab Results   Component Value Date    FERRITIN 154 10/14/2022     BMP  Lab Results   Component Value Date     10/21/2022    K 3.9 10/21/2022    CL 98 10/21/2022    CO2 25 10/21/2022    BUN 20 10/21/2022    CREATININE 1.1 10/21/2022    CALCIUM 9.0 10/21/2022    ANIONGAP 13 10/21/2022    ESTGFRAFRICA >60.0 06/28/2022    EGFRNONAA >60.0 06/28/2022       Imaging:       Assessment:     1. Normocytic anemia          Plan:     Problem List Items Addressed This Visit          Oncology    Normocytic anemia - Primary    Current Assessment & Plan     Downtrended then improved back to 10.  Feeling better after starting oral iron but still has intermittent fatigue. No longer has night sweats.  Stopped hydralazine initially then restarted about 1 week ago.  -continue oral iron supplementation indefinitely  --elevated ferritin is reactive with her known ESR/CRP elevation  -repeat labs CBC and follow up in 4 months  -discussed with her that if her anemia worsens (ie. Hg trend below 8) or if she develops new cytopenias, would need to consider bone marrow biopsy         Relevant Medications    ferrous sulfate (FEROSUL) 325 mg (65 mg iron) Tab tablet    Other Relevant Orders    CBC Auto Differential       Guillermo Rai MD  Hematology Oncology

## 2023-01-03 DIAGNOSIS — E11.9 TYPE 2 DIABETES MELLITUS WITHOUT COMPLICATION, WITHOUT LONG-TERM CURRENT USE OF INSULIN: ICD-10-CM

## 2023-01-03 NOTE — TELEPHONE ENCOUNTER
No new care gaps identified.  [facility] Embedded Care Gaps. Reference number: [id number]. 1/03/2023   2:47:35 PM CST

## 2023-01-17 ENCOUNTER — OFFICE VISIT (OUTPATIENT)
Dept: FAMILY MEDICINE | Facility: CLINIC | Age: 88
End: 2023-01-17
Payer: MEDICARE

## 2023-01-17 VITALS
DIASTOLIC BLOOD PRESSURE: 68 MMHG | OXYGEN SATURATION: 95 % | TEMPERATURE: 98 F | BODY MASS INDEX: 27.12 KG/M2 | WEIGHT: 134.5 LBS | HEART RATE: 87 BPM | SYSTOLIC BLOOD PRESSURE: 134 MMHG | HEIGHT: 59 IN | RESPIRATION RATE: 18 BRPM

## 2023-01-17 DIAGNOSIS — I42.1 OBSTRUCTIVE HYPERTROPHIC CARDIOMYOPATHY: ICD-10-CM

## 2023-01-17 DIAGNOSIS — E11.59 HYPERTENSION ASSOCIATED WITH DIABETES: ICD-10-CM

## 2023-01-17 DIAGNOSIS — E66.3 OVERWEIGHT (BMI 25.0-29.9): ICD-10-CM

## 2023-01-17 DIAGNOSIS — I15.2 HYPERTENSION ASSOCIATED WITH DIABETES: ICD-10-CM

## 2023-01-17 DIAGNOSIS — R60.0 BILATERAL LEG EDEMA: Primary | ICD-10-CM

## 2023-01-17 PROCEDURE — 1101F PR PT FALLS ASSESS DOC 0-1 FALLS W/OUT INJ PAST YR: ICD-10-PCS | Mod: CPTII,S$GLB,, | Performed by: FAMILY MEDICINE

## 2023-01-17 PROCEDURE — 1160F RVW MEDS BY RX/DR IN RCRD: CPT | Mod: CPTII,S$GLB,, | Performed by: FAMILY MEDICINE

## 2023-01-17 PROCEDURE — 3288F FALL RISK ASSESSMENT DOCD: CPT | Mod: CPTII,S$GLB,, | Performed by: FAMILY MEDICINE

## 2023-01-17 PROCEDURE — 99214 OFFICE O/P EST MOD 30 MIN: CPT | Mod: S$GLB,,, | Performed by: FAMILY MEDICINE

## 2023-01-17 PROCEDURE — 99214 PR OFFICE/OUTPT VISIT, EST, LEVL IV, 30-39 MIN: ICD-10-PCS | Mod: S$GLB,,, | Performed by: FAMILY MEDICINE

## 2023-01-17 PROCEDURE — 1101F PT FALLS ASSESS-DOCD LE1/YR: CPT | Mod: CPTII,S$GLB,, | Performed by: FAMILY MEDICINE

## 2023-01-17 PROCEDURE — 1159F MED LIST DOCD IN RCRD: CPT | Mod: CPTII,S$GLB,, | Performed by: FAMILY MEDICINE

## 2023-01-17 PROCEDURE — 1159F PR MEDICATION LIST DOCUMENTED IN MEDICAL RECORD: ICD-10-PCS | Mod: CPTII,S$GLB,, | Performed by: FAMILY MEDICINE

## 2023-01-17 PROCEDURE — 1125F PR PAIN SEVERITY QUANTIFIED, PAIN PRESENT: ICD-10-PCS | Mod: CPTII,S$GLB,, | Performed by: FAMILY MEDICINE

## 2023-01-17 PROCEDURE — 1125F AMNT PAIN NOTED PAIN PRSNT: CPT | Mod: CPTII,S$GLB,, | Performed by: FAMILY MEDICINE

## 2023-01-17 PROCEDURE — 3288F PR FALLS RISK ASSESSMENT DOCUMENTED: ICD-10-PCS | Mod: CPTII,S$GLB,, | Performed by: FAMILY MEDICINE

## 2023-01-17 PROCEDURE — 99999 PR PBB SHADOW E&M-EST. PATIENT-LVL V: CPT | Mod: PBBFAC,,, | Performed by: FAMILY MEDICINE

## 2023-01-17 PROCEDURE — 99999 PR PBB SHADOW E&M-EST. PATIENT-LVL V: ICD-10-PCS | Mod: PBBFAC,,, | Performed by: FAMILY MEDICINE

## 2023-01-17 PROCEDURE — 1160F PR REVIEW ALL MEDS BY PRESCRIBER/CLIN PHARMACIST DOCUMENTED: ICD-10-PCS | Mod: CPTII,S$GLB,, | Performed by: FAMILY MEDICINE

## 2023-01-17 RX ORDER — HYDRALAZINE HYDROCHLORIDE 100 MG/1
100 TABLET, FILM COATED ORAL 3 TIMES DAILY
COMMUNITY
Start: 2022-12-21 | End: 2023-06-01 | Stop reason: SDUPTHER

## 2023-01-17 NOTE — PROGRESS NOTES
Edie Hammond    Chief Complaint   Patient presents with    Follow-up    Leg Swelling    Toe Pain       History of Present Illness:   Ms. Hammond comes in today with complaint of having swelling of both of her lower legs without pain, redness, or warmth or associated trauma for 2 weeks.  She states swelling worsened on last week at which time she states she noticed having pain at her left toes and right great toe; she states she took extra-strength Tylenol 2 hours ago with help for painful toes.  She states she has been eating slightly salt ear food at her current residence for 2 days.  She states she had been moving to her new residence and completed the moving process 2 days ago.  She states morning glucose levels range < 100.    Otherwise, she denies having fever, chills, fatigue, appetite changes; shortness of breath, cough, wheezing; chest pain, palpitations; abdominal pain, nausea, vomiting, diarrhea, constipation; unusual urinary symptoms; polydipsia, polyphagia, polyuria, hot or cold intolerance; back pain; headache; anxiety, depression, homicidal or suicidal thoughts; bleeding or head trauma.    She follows with Dr. 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    She follows with Dr. Darren Rai, hematologist/oncologist, for normocytic anemia.              Current Outpatient Medications   Medication Sig    amLODIPine (NORVASC) 10 MG tablet Take 10 mg by mouth once daily.    blood sugar diagnostic (BLOOD GLUCOSE TEST) Strp Use twice daily prn. Dx: E11.9    bumetanide (BUMEX) 1 MG tablet But also states some times takes 3 times per week    cetirizine (ZYRTEC) 10 MG tablet Take 10 mg by mouth once daily.     cloNIDine (CATAPRES) 0.1 MG tablet Take 0.1 mg by mouth daily as needed (takes only if SBP > 180 per cardiologist).     dorzolamide-timolol  2-0.5% (COSOPT) 22.3-6.8 mg/mL ophthalmic solution 1 drop.    EPINEPHrine (EPIPEN) 0.3 mg/0.3 mL AtIn INJECT 0.3MLS INTO THE MUSCLE ONCE FOR 1 DOSE    ferrous sulfate (FEROSUL) 325 mg (65 mg iron) Tab tablet Take 1 tablet (325 mg total) by mouth once daily.    hydrALAZINE (APRESOLINE) 100 MG tablet Take 100 mg by mouth 3 (three) times daily.    lancets Misc Use twice daily prn. Dx: E11.9    latanoprost 0.005 % ophthalmic solution SMARTSI Drop(s) In Eye(s) Every Evening    levothyroxine (SYNTHROID) 50 MCG tablet TAKE ONE TABLET BY MOUTH ONCE DAILY before breakfast    montelukast (SINGULAIR) 10 mg tablet TAKE ONE TABLET BY MOUTH IN THE EVENING    multivitamin with minerals tablet Take 1 tablet by mouth once daily.     SITagliptin phosphate (JANUVIA) 100 MG Tab Take 1 tablet (100 mg total) by mouth once daily.    blood-glucose meter kit Use as instructed - dx: E11.9    nitroGLYCERIN (NITROSTAT) 0.4 MG SL tablet Place under the tongue.       Review of Systems   Constitutional:  Negative for activity change, appetite change, chills, fatigue and fever.   Eyes:  Negative for visual disturbance.        See history of present illness.   Respiratory:  Negative for cough, shortness of breath and wheezing.    Cardiovascular:  Positive for leg swelling. Negative for chest pain and palpitations.        See history of present illness.   Gastrointestinal:  Negative for abdominal pain, constipation, diarrhea, nausea and vomiting.   Endocrine: Negative for cold intolerance, heat intolerance, polydipsia, polyphagia and polyuria.        See history of present illness.   Genitourinary:  Negative for difficulty urinating.   Musculoskeletal:  Positive for myalgias. Negative for arthralgias and back pain.        See history of present illness.     Allergic/Immunologic:        See history of present illness.   Neurological:  Negative for weakness and headaches.   Psychiatric/Behavioral:  Negative for confusion, dysphoric mood and  suicidal ideas. The patient is not nervous/anxious.         Negative for homicidal ideas. See history of present illness.     Objective:  Physical Exam  Vitals and nursing note reviewed.   Constitutional:       General: She is not in acute distress.     Appearance: Normal appearance. She is well-developed. She is not ill-appearing, toxic-appearing or diaphoretic.   Cardiovascular:      Rate and Rhythm: Normal rate and regular rhythm.      Pulses:           Dorsalis pedis pulses are 3+ on the right side and 3+ on the left side.      Heart sounds: Murmur heard.     No gallop.   Pulmonary:      Effort: Pulmonary effort is normal.      Breath sounds: Normal breath sounds.   Abdominal:      General: Bowel sounds are normal. There is no distension.      Palpations: Abdomen is soft. There is no mass.      Tenderness: There is no abdominal tenderness. There is no guarding or rebound.   Musculoskeletal:         General: Swelling and tenderness present. Normal range of motion.      Cervical back: Normal range of motion and neck supple. No tenderness.      Comments: She is ambulatory with assistance of cane. Swelling of both lower legs with negative Homans' sign at both lower legs. Tender at right great toe and left toes with full range of motion noted. Slight redness at skin at inner aspect of upper portion of left lower leg.    Feet:      Right foot:      Protective Sensation: 5 sites tested.  5 sites sensed.      Skin integrity: No ulcer or skin breakdown.      Left foot:      Protective Sensation: 5 sites tested.  5 sites sensed.      Skin integrity: No ulcer or skin breakdown.   Lymphadenopathy:      Cervical: No cervical adenopathy.   Skin:     General: Skin is warm and dry.   Neurological:      Mental Status: She is alert.      Motor: No abnormal muscle tone.   Psychiatric:         Mood and Affect: Mood normal.         Speech: Speech normal.         Behavior: Behavior normal.         Thought Content: Thought content  normal.         Judgment: Judgment normal.       ASSESSMENT:  1. Bilateral leg edema    2. Hypertension associated with diabetes    3. Obstructive hypertrophic cardiomyopathy    4. Overweight (BMI 25.0-29.9)        PLAN:  Edie was seen today for follow-up, leg swelling and toe pain.    Diagnoses and all orders for this visit:    Bilateral leg edema  -     US Lower Extremity Veins Bilateral; Future  -     Basic Metabolic Panel; Future  -     Uric Acid; Future    Hypertension associated with diabetes    Obstructive hypertrophic cardiomyopathy    Overweight (BMI 25.0-29.9)      Patient advised to call for results.  Continue current medications, follow low sodium, low cholesterol, low carb diet, daily walks.  Continue ES Tylenol for pain as directed.  Elevate legs prn.  Keep follow up with specialists.  Follow up if symptoms worsen or fail to improve.      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  For unprovoked DVT - treatment with Eliquis 10 mg twice daily x 7 days, then 5 mg twice daily for at least 3 months BUT will consult with hematology for appropriateness of anticoagulant therapy (especially due to patient's age) and duration of therapy. Lab results pending at this time. I spoke w/HOANG Bolden w/hematology: I will start patient on Eliquis 10 mg twice daily x 7 days and hematology will schedule to see patient within 1 week for consultation for additional treatment plan.    Patient was called and notified of above. She stated she currently takes baby Aspirin daily. I advised she stop taking Aspirin and avoid taking NSAID's while taking Eliquis as may increase bleeding and avoid falls due to bleeding risk. She verbalized understanding.

## 2023-01-19 ENCOUNTER — HOSPITAL ENCOUNTER (OUTPATIENT)
Dept: RADIOLOGY | Facility: HOSPITAL | Age: 88
Discharge: HOME | End: 2023-01-19
Attending: FAMILY MEDICINE
Payer: MEDICARE

## 2023-01-19 DIAGNOSIS — R60.0 BILATERAL LEG EDEMA: Primary | ICD-10-CM

## 2023-01-19 DIAGNOSIS — I82.412 DEEP VEIN THROMBOSIS (DVT) OF FEMORAL VEIN OF LEFT LOWER EXTREMITY, UNSPECIFIED CHRONICITY: Primary | ICD-10-CM

## 2023-01-19 DIAGNOSIS — R60.0 BILATERAL LEG EDEMA: ICD-10-CM

## 2023-01-19 PROCEDURE — 93970 EXTREMITY STUDY: CPT | Mod: 26,,, | Performed by: RADIOLOGY

## 2023-01-19 PROCEDURE — 93970 US LOWER EXTREMITY VEINS BILATERAL: ICD-10-PCS | Mod: 26,,, | Performed by: RADIOLOGY

## 2023-01-19 PROCEDURE — 93970 EXTREMITY STUDY: CPT | Mod: TC

## 2023-01-20 NOTE — TELEPHONE ENCOUNTER
Called pharmacy to clarify prescription. PT. State that insurance will not cover 2 pill to equal 10 mg twice daily.

## 2023-01-20 NOTE — TELEPHONE ENCOUNTER
----- Message from Marla John sent at 1/20/2023  1:12 PM CST -----  Pt is requesting the nurse give her a call back regarding a prescription. Call back number is [phone]. Thx jm

## 2023-01-23 ENCOUNTER — LAB VISIT (OUTPATIENT)
Dept: LAB | Facility: HOSPITAL | Age: 88
End: 2023-01-23
Attending: INTERNAL MEDICINE
Payer: MEDICARE

## 2023-01-23 ENCOUNTER — OFFICE VISIT (OUTPATIENT)
Dept: FAMILY MEDICINE | Facility: CLINIC | Age: 88
End: 2023-01-23
Payer: MEDICARE

## 2023-01-23 VITALS
HEART RATE: 80 BPM | WEIGHT: 134.06 LBS | BODY MASS INDEX: 27.07 KG/M2 | RESPIRATION RATE: 16 BRPM | SYSTOLIC BLOOD PRESSURE: 130 MMHG | TEMPERATURE: 98 F | DIASTOLIC BLOOD PRESSURE: 60 MMHG

## 2023-01-23 DIAGNOSIS — I82.409 DEEP VEIN THROMBOSIS (DVT) OF LOWER EXTREMITY, UNSPECIFIED CHRONICITY, UNSPECIFIED LATERALITY, UNSPECIFIED VEIN: Primary | ICD-10-CM

## 2023-01-23 DIAGNOSIS — Z79.01 ANTICOAGULATED: ICD-10-CM

## 2023-01-23 DIAGNOSIS — T14.8XXA SUPERFICIAL HEMATOMA: ICD-10-CM

## 2023-01-23 LAB
BASOPHILS # BLD AUTO: 0.11 K/UL (ref 0–0.2)
BASOPHILS NFR BLD: 1.2 % (ref 0–1.9)
DIFFERENTIAL METHOD: ABNORMAL
EOSINOPHIL # BLD AUTO: 0.2 K/UL (ref 0–0.5)
EOSINOPHIL NFR BLD: 2.2 % (ref 0–8)
ERYTHROCYTE [DISTWIDTH] IN BLOOD BY AUTOMATED COUNT: 16.3 % (ref 11.5–14.5)
HCT VFR BLD AUTO: 32.2 % (ref 37–48.5)
HGB BLD-MCNC: 9.9 G/DL (ref 12–16)
IMM GRANULOCYTES # BLD AUTO: 0.05 K/UL (ref 0–0.04)
IMM GRANULOCYTES NFR BLD AUTO: 0.6 % (ref 0–0.5)
LYMPHOCYTES # BLD AUTO: 1.7 K/UL (ref 1–4.8)
LYMPHOCYTES NFR BLD: 19.3 % (ref 18–48)
MCH RBC QN AUTO: 28.6 PG (ref 27–31)
MCHC RBC AUTO-ENTMCNC: 30.7 G/DL (ref 32–36)
MCV RBC AUTO: 93 FL (ref 82–98)
MONOCYTES # BLD AUTO: 0.9 K/UL (ref 0.3–1)
MONOCYTES NFR BLD: 10 % (ref 4–15)
NEUTROPHILS # BLD AUTO: 5.9 K/UL (ref 1.8–7.7)
NEUTROPHILS NFR BLD: 66.7 % (ref 38–73)
NRBC BLD-RTO: 0 /100 WBC
PLATELET # BLD AUTO: 352 K/UL (ref 150–450)
PMV BLD AUTO: 9.8 FL (ref 9.2–12.9)
RBC # BLD AUTO: 3.46 M/UL (ref 4–5.4)
WBC # BLD AUTO: 8.91 K/UL (ref 3.9–12.7)

## 2023-01-23 PROCEDURE — 1101F PR PT FALLS ASSESS DOC 0-1 FALLS W/OUT INJ PAST YR: ICD-10-PCS | Mod: CPTII,S$GLB,, | Performed by: INTERNAL MEDICINE

## 2023-01-23 PROCEDURE — 99999 PR PBB SHADOW E&M-EST. PATIENT-LVL IV: CPT | Mod: PBBFAC,,, | Performed by: INTERNAL MEDICINE

## 2023-01-23 PROCEDURE — 3288F FALL RISK ASSESSMENT DOCD: CPT | Mod: CPTII,S$GLB,, | Performed by: INTERNAL MEDICINE

## 2023-01-23 PROCEDURE — 99213 PR OFFICE/OUTPT VISIT, EST, LEVL III, 20-29 MIN: ICD-10-PCS | Mod: S$GLB,,, | Performed by: INTERNAL MEDICINE

## 2023-01-23 PROCEDURE — 1126F PR PAIN SEVERITY QUANTIFIED, NO PAIN PRESENT: ICD-10-PCS | Mod: CPTII,S$GLB,, | Performed by: INTERNAL MEDICINE

## 2023-01-23 PROCEDURE — 3288F PR FALLS RISK ASSESSMENT DOCUMENTED: ICD-10-PCS | Mod: CPTII,S$GLB,, | Performed by: INTERNAL MEDICINE

## 2023-01-23 PROCEDURE — 99213 OFFICE O/P EST LOW 20 MIN: CPT | Mod: S$GLB,,, | Performed by: INTERNAL MEDICINE

## 2023-01-23 PROCEDURE — 99999 PR PBB SHADOW E&M-EST. PATIENT-LVL IV: ICD-10-PCS | Mod: PBBFAC,,, | Performed by: INTERNAL MEDICINE

## 2023-01-23 PROCEDURE — 85025 COMPLETE CBC W/AUTO DIFF WBC: CPT | Performed by: INTERNAL MEDICINE

## 2023-01-23 PROCEDURE — 1126F AMNT PAIN NOTED NONE PRSNT: CPT | Mod: CPTII,S$GLB,, | Performed by: INTERNAL MEDICINE

## 2023-01-23 PROCEDURE — 36415 COLL VENOUS BLD VENIPUNCTURE: CPT | Mod: PO | Performed by: INTERNAL MEDICINE

## 2023-01-23 PROCEDURE — 1101F PT FALLS ASSESS-DOCD LE1/YR: CPT | Mod: CPTII,S$GLB,, | Performed by: INTERNAL MEDICINE

## 2023-01-23 NOTE — PROGRESS NOTES
Subjective:       Patient ID: Edie Hammond is a 89 y.o. female.    Chief Complaint: red spot (Below left knee)    Noticed red spot last night lateral left leg below knee.        Not painful----------not growing.        Pt on eliquis since last week when dx'd DVT left leg----------    Rash  The current episode started yesterday. The problem is unchanged. The affected locations include the left leg. The rash is characterized by redness. She was exposed to a new medication. Associated symptoms include joint pain. Pertinent negatives include no anorexia, congestion, cough, diarrhea, eye pain, facial edema, fatigue, fever, nail changes, rhinorrhea, shortness of breath, sore throat or vomiting. Past treatments include nothing. The treatment provided no relief. Her past medical history is significant for allergies and varicella. There is no history of asthma or eczema.   Past Medical History:   Diagnosis Date    Diabetes     Glaucoma     Follows with Dr. James Reeves, ophthalmologist    Hand arthritis     Heart murmur     Follows with Dr. Faisal Milton, cardiology    HTN (hypertension)     Hypothyroidism     Intracranial hemorrhage     Postmenopausal     Seasonal allergies     Type 2 diabetes mellitus      Past Surgical History:   Procedure Laterality Date    bladder lift      BREAST BIOPSY      BUNIONECTOMY Bilateral     1970s    CATARACT EXTRACTION Bilateral     1999    CHOLECYSTECTOMY      CRANIOTOMY  2017    s/p fall    FOOT NEUROMA SURGERY Right 1982    R foot - neuroma removed    MYOMECTOMY      TOTAL ABDOMINAL HYSTERECTOMY W/ BILATERAL SALPINGOOPHORECTOMY      due to fibroid     Family History   Problem Relation Age of Onset    No Known Problems Son     Heart attack Mother     Diabetes Sister     Hypertension Sister     Hypertension Sister     Breast cancer Maternal Aunt     Stroke Neg Hx      Social History     Socioeconomic History    Marital status:    Occupational History    Occupation: Retired     Tobacco Use    Smoking status: Never    Smokeless tobacco: Never   Substance and Sexual Activity    Alcohol use: Yes     Comment: red wine rarely    Drug use: Never   Social History Narrative    She wears seatbelt.     Social Determinants of Health     Financial Resource Strain: Low Risk     Difficulty of Paying Living Expenses: Not hard at all   Food Insecurity: No Food Insecurity    Worried About Running Out of Food in the Last Year: Never true    Ran Out of Food in the Last Year: Never true   Transportation Needs: No Transportation Needs    Lack of Transportation (Medical): No    Lack of Transportation (Non-Medical): No   Physical Activity: Inactive    Days of Exercise per Week: 0 days    Minutes of Exercise per Session: 0 min   Stress: No Stress Concern Present    Feeling of Stress : Not at all   Social Connections: Moderately Integrated    Frequency of Communication with Friends and Family: More than three times a week    Frequency of Social Gatherings with Friends and Family: Three times a week    Attends [employer] Services: More than 4 times per year    Active Member of Clubs or Organizations: Yes    Attends Club or Organization Meetings: 1 to 4 times per year    Marital Status:    Housing Stability: Low Risk     Unable to Pay for Housing in the Last Year: No    Number of Places Lived in the Last Year: 1    Unstable Housing in the Last Year: No     Review of Systems   Constitutional:  Negative for fatigue and fever.   HENT:  Negative for congestion, rhinorrhea and sore throat.    Eyes:  Negative for pain.   Respiratory:  Negative for apnea, cough, choking, chest tightness, shortness of breath, wheezing and stridor.    Cardiovascular:  Negative for chest pain and palpitations.   Gastrointestinal:  Negative for abdominal pain, anorexia, diarrhea and vomiting.   Musculoskeletal:  Positive for joint pain.   Skin:  Positive for rash. Negative for nail changes.   Neurological:  Negative  for dizziness and weakness.     Objective:      Physical Exam  Vitals and nursing note reviewed.   Constitutional:       General: She is not in acute distress.     Appearance: Normal appearance. She is well-developed. She is not diaphoretic.   HENT:      Head: Normocephalic and atraumatic.      Mouth/Throat:      Pharynx: No oropharyngeal exudate.   Eyes:      General: No scleral icterus.  Neck:      Thyroid: No thyromegaly.      Vascular: No carotid bruit or JVD.      Trachea: No tracheal deviation.   Cardiovascular:      Rate and Rhythm: Normal rate and regular rhythm.      Heart sounds: Normal heart sounds.   Pulmonary:      Effort: Pulmonary effort is normal. No respiratory distress.      Breath sounds: Normal breath sounds. No wheezing or rales.   Chest:      Chest wall: No tenderness.   Abdominal:      General: Bowel sounds are normal. There is no distension.      Palpations: Abdomen is soft.      Tenderness: There is no abdominal tenderness. There is no guarding or rebound.   Musculoskeletal:         General: No tenderness. Normal range of motion.      Cervical back: Normal range of motion and neck supple.   Lymphadenopathy:      Cervical: No cervical adenopathy.   Skin:     General: Skin is warm and dry.      Coloration: Skin is not pale.      Findings: No erythema or rash.      Comments: Superficial hematoma distal left knee----lateral leg-not tender to palpate---------   Neurological:      Mental Status: She is alert and oriented to person, place, and time.   Psychiatric:         Behavior: Behavior normal.         Thought Content: Thought content normal.         Judgment: Judgment normal.       CMP  Sodium   Date Value Ref Range Status   01/19/2023 136 136 - 145 mmol/L Final     Potassium   Date Value Ref Range Status   01/19/2023 3.8 3.5 - 5.1 mmol/L Final     Chloride   Date Value Ref Range Status   01/19/2023 99 95 - 110 mmol/L Final     CO2   Date Value Ref Range Status   01/19/2023 27 23 - 29 mmol/L  Final     Glucose   Date Value Ref Range Status   01/19/2023 172 (H) 70 - 110 mg/dL Final     BUN   Date Value Ref Range Status   01/19/2023 22 10 - 30 mg/dL Final     Creatinine   Date Value Ref Range Status   01/19/2023 1.0 0.5 - 1.4 mg/dL Final     Calcium   Date Value Ref Range Status   01/19/2023 9.2 8.7 - 10.5 mg/dL Final     Total Protein   Date Value Ref Range Status   10/21/2022 7.0 6.0 - 8.4 g/dL Final     Albumin   Date Value Ref Range Status   10/21/2022 3.0 (L) 3.5 - 5.2 g/dL Final     Total Bilirubin   Date Value Ref Range Status   10/21/2022 0.4 0.1 - 1.0 mg/dL Final     Comment:     For infants and newborns, interpretation of results should be based  on gestational age, weight and in agreement with clinical  observations.    Premature Infant recommended reference ranges:  Up to 24 hours.............<8.0 mg/dL  Up to 48 hours............<12.0 mg/dL  3-5 days..................<15.0 mg/dL  6-29 days.................<15.0 mg/dL       Alkaline Phosphatase   Date Value Ref Range Status   10/21/2022 60 55 - 135 U/L Final     AST   Date Value Ref Range Status   10/21/2022 13 10 - 40 U/L Final     ALT   Date Value Ref Range Status   10/21/2022 7 (L) 10 - 44 U/L Final     Anion Gap   Date Value Ref Range Status   01/19/2023 10 8 - 16 mmol/L Final     eGFR if    Date Value Ref Range Status   06/28/2022 >60.0 >60 mL/min/1.73 m^2 Final     eGFR if non    Date Value Ref Range Status   06/28/2022 >60.0 >60 mL/min/1.73 m^2 Final     Comment:     Calculation used to obtain the estimated glomerular filtration  rate (eGFR) is the CKD-EPI equation.        Lab Results   Component Value Date    WBC 8.93 01/19/2023    HGB 10.9 (L) 01/19/2023    HCT 33.5 (L) 01/19/2023    MCV 90 01/19/2023     01/19/2023     Lab Results   Component Value Date    CHOL 185 10/06/2022     Lab Results   Component Value Date    HDL 58 10/06/2022     Lab Results   Component Value Date    LDLCALC 108.4  10/06/2022     Lab Results   Component Value Date    TRIG 93 10/06/2022     Lab Results   Component Value Date    CHOLHDL 31.4 10/06/2022     Lab Results   Component Value Date    TSH 3.004 10/06/2022     Lab Results   Component Value Date    HGBA1C 5.8 (H) 10/06/2022     Assessment:       1. Deep vein thrombosis (DVT) of lower extremity, unspecified chronicity, unspecified laterality, unspecified vein    2. Superficial hematoma    3. Anticoagulated          Plan:   Deep vein thrombosis (DVT) of lower extremity, unspecified chronicity, unspecified laterality, unspecified vein    Superficial hematoma-----left leg---------    Anticoagulated  -     CBC Auto Differential; Future; Expected date: 01/23/2023      As above--------------------f/u with hematology 3 days as scheduled------------------go to er for worsening symptoms-----------------f/u PCP_

## 2023-01-26 ENCOUNTER — TELEPHONE (OUTPATIENT)
Dept: FAMILY MEDICINE | Facility: CLINIC | Age: 88
End: 2023-01-26
Payer: MEDICARE

## 2023-01-26 ENCOUNTER — OFFICE VISIT (OUTPATIENT)
Dept: HEMATOLOGY/ONCOLOGY | Facility: CLINIC | Age: 88
End: 2023-01-26
Payer: MEDICARE

## 2023-01-26 VITALS
OXYGEN SATURATION: 99 % | TEMPERATURE: 99 F | SYSTOLIC BLOOD PRESSURE: 147 MMHG | DIASTOLIC BLOOD PRESSURE: 60 MMHG | HEART RATE: 58 BPM

## 2023-01-26 DIAGNOSIS — D53.9 NUTRITIONAL ANEMIA, UNSPECIFIED: Primary | ICD-10-CM

## 2023-01-26 DIAGNOSIS — I82.412 DEEP VEIN THROMBOSIS (DVT) OF FEMORAL VEIN OF LEFT LOWER EXTREMITY, UNSPECIFIED CHRONICITY: Primary | ICD-10-CM

## 2023-01-26 PROCEDURE — 99999 PR PBB SHADOW E&M-EST. PATIENT-LVL III: CPT | Mod: PBBFAC,,, | Performed by: INTERNAL MEDICINE

## 2023-01-26 PROCEDURE — 1159F MED LIST DOCD IN RCRD: CPT | Mod: CPTII,S$GLB,, | Performed by: INTERNAL MEDICINE

## 2023-01-26 PROCEDURE — 1101F PT FALLS ASSESS-DOCD LE1/YR: CPT | Mod: CPTII,S$GLB,, | Performed by: INTERNAL MEDICINE

## 2023-01-26 PROCEDURE — 99213 OFFICE O/P EST LOW 20 MIN: CPT | Mod: S$GLB,,, | Performed by: INTERNAL MEDICINE

## 2023-01-26 PROCEDURE — 1160F PR REVIEW ALL MEDS BY PRESCRIBER/CLIN PHARMACIST DOCUMENTED: ICD-10-PCS | Mod: CPTII,S$GLB,, | Performed by: INTERNAL MEDICINE

## 2023-01-26 PROCEDURE — 1160F RVW MEDS BY RX/DR IN RCRD: CPT | Mod: CPTII,S$GLB,, | Performed by: INTERNAL MEDICINE

## 2023-01-26 PROCEDURE — 99213 PR OFFICE/OUTPT VISIT, EST, LEVL III, 20-29 MIN: ICD-10-PCS | Mod: S$GLB,,, | Performed by: INTERNAL MEDICINE

## 2023-01-26 PROCEDURE — 1159F PR MEDICATION LIST DOCUMENTED IN MEDICAL RECORD: ICD-10-PCS | Mod: CPTII,S$GLB,, | Performed by: INTERNAL MEDICINE

## 2023-01-26 PROCEDURE — 1101F PR PT FALLS ASSESS DOC 0-1 FALLS W/OUT INJ PAST YR: ICD-10-PCS | Mod: CPTII,S$GLB,, | Performed by: INTERNAL MEDICINE

## 2023-01-26 PROCEDURE — 1126F PR PAIN SEVERITY QUANTIFIED, NO PAIN PRESENT: ICD-10-PCS | Mod: CPTII,S$GLB,, | Performed by: INTERNAL MEDICINE

## 2023-01-26 PROCEDURE — 3288F PR FALLS RISK ASSESSMENT DOCUMENTED: ICD-10-PCS | Mod: CPTII,S$GLB,, | Performed by: INTERNAL MEDICINE

## 2023-01-26 PROCEDURE — 3288F FALL RISK ASSESSMENT DOCD: CPT | Mod: CPTII,S$GLB,, | Performed by: INTERNAL MEDICINE

## 2023-01-26 PROCEDURE — 1126F AMNT PAIN NOTED NONE PRSNT: CPT | Mod: CPTII,S$GLB,, | Performed by: INTERNAL MEDICINE

## 2023-01-26 PROCEDURE — 99999 PR PBB SHADOW E&M-EST. PATIENT-LVL III: ICD-10-PCS | Mod: PBBFAC,,, | Performed by: INTERNAL MEDICINE

## 2023-01-26 NOTE — TELEPHONE ENCOUNTER
Ok for Eliquis 5 mg twice daily for 6 months.    I have put the following orders and/or medications to this note.  Please advise pt and assist.    No orders of the defined types were placed in this encounter.      Medications Ordered This Encounter   Medications    apixaban (ELIQUIS) 5 mg Tab     Sig: Take 1 tablet (5 mg total) by mouth 2 (two) times daily.     Dispense:  60 tablet     Refill:  5

## 2023-01-26 NOTE — PROGRESS NOTES
Subjective:      Patient ID: Edie Hammond is a 89 y.o. female.    Chief Complaint: No chief complaint on file.      HPI: This is a 89 year old woman with HTN, DM, Hypothyroidism, and anemia.  She was started on oral iron and hemoglobin showed some improvement from 8.3 on 11/4/2022 to 9.9 on 1/23/2023. She is followed by Dr. Rai. She reports recent LLE DVT - 1/17/2023 and has been initiated on anticoagulation with Eliquis. She has no obvious hematochezia or melena.    Review of Systems   Constitutional:  Negative for chills and fever.   HENT:  Negative for mouth sores and sore throat.    Respiratory:  Negative for cough, shortness of breath and wheezing.    Cardiovascular:  Negative for chest pain and palpitations.   Gastrointestinal:  Negative for abdominal pain and blood in stool.   Genitourinary:  Negative for hematuria.   Neurological:  Negative for dizziness and headaches.     Objective:     Physical Exam  Constitutional:       Appearance: Normal appearance.   HENT:      Head: Normocephalic and atraumatic.      Mouth/Throat:      Pharynx: No oropharyngeal exudate or posterior oropharyngeal erythema.   Eyes:      Pupils: Pupils are equal, round, and reactive to light.   Pulmonary:      Effort: No respiratory distress.      Breath sounds: No wheezing, rhonchi or rales.   Abdominal:      Tenderness: There is no abdominal tenderness. There is no guarding or rebound.   Musculoskeletal:      Cervical back: Neck supple.   Lymphadenopathy:      Cervical: No cervical adenopathy.   Skin:     Findings: No erythema, lesion or rash.   Neurological:      Mental Status: She is alert.   Psychiatric:         Mood and Affect: Mood normal.         Behavior: Behavior normal.         Thought Content: Thought content normal.         Judgment: Judgment normal.       Assessment:     Problem List Items Addressed This Visit    None       Anemia - multifactorial etiology.  LLE DVT - 1/17/2023,  -  on Eliquis.     Plan:      LLE DVT - May continue anticoagulation - Eliquis for 6 months.  On oral iron for anemia.  RTC : 2 months - to see Dr. Rai -  CBC /ferritin/iron/TIBC.

## 2023-01-26 NOTE — TELEPHONE ENCOUNTER
----- Message from Malcolm Coffman sent at 1/26/2023  3:27 PM CST -----  Contact: patient  Patient need nurse to call concerning meds refill please call her at [phone], she need refill today please..      Thanks  EL

## 2023-01-26 NOTE — TELEPHONE ENCOUNTER
Called pt. She states she saw  hematology/oncology today. She says he mentioned her wanting to continue eliquis for 6 months. She tried to get him to prescribe it but she said he did not seem interested and recommended she follow up with pcp and see what you recommended for dosing. PT. States she would like to pick medication up today if possible.

## 2023-02-01 ENCOUNTER — TELEPHONE (OUTPATIENT)
Dept: FAMILY MEDICINE | Facility: CLINIC | Age: 88
End: 2023-02-01
Payer: MEDICARE

## 2023-02-01 NOTE — TELEPHONE ENCOUNTER
Pt wanted to inform you of her recent ER visit and would like for you to review her visit and talk about possible PT

## 2023-02-01 NOTE — TELEPHONE ENCOUNTER
----- Message from Mary Anne Avery sent at 2/1/2023 12:39 PM CST -----  Contact: Pt  Wanted to let the nurse/  know that she went into the ER on last night for pain in leg / diagnosed with a DVT / was seen in the ER from [facility] and the Dr may be able to see some of the results online for the pt and can be reached at [phone] or pt [email or web address]/dbw     Wants to also discuss poss phys therapy

## 2023-02-06 ENCOUNTER — OFFICE VISIT (OUTPATIENT)
Dept: FAMILY MEDICINE | Facility: CLINIC | Age: 88
End: 2023-02-06
Payer: MEDICARE

## 2023-02-06 ENCOUNTER — LAB VISIT (OUTPATIENT)
Dept: LAB | Facility: HOSPITAL | Age: 88
End: 2023-02-06
Attending: FAMILY MEDICINE
Payer: MEDICARE

## 2023-02-06 VITALS
TEMPERATURE: 98 F | OXYGEN SATURATION: 96 % | WEIGHT: 134.06 LBS | HEART RATE: 76 BPM | SYSTOLIC BLOOD PRESSURE: 138 MMHG | HEIGHT: 59 IN | BODY MASS INDEX: 27.03 KG/M2 | RESPIRATION RATE: 18 BRPM | DIASTOLIC BLOOD PRESSURE: 78 MMHG

## 2023-02-06 DIAGNOSIS — E11.59 HYPERTENSION ASSOCIATED WITH DIABETES: Primary | ICD-10-CM

## 2023-02-06 DIAGNOSIS — I15.2 HYPERTENSION ASSOCIATED WITH DIABETES: Primary | ICD-10-CM

## 2023-02-06 DIAGNOSIS — N18.32 STAGE 3B CHRONIC KIDNEY DISEASE: ICD-10-CM

## 2023-02-06 DIAGNOSIS — I35.0 NONRHEUMATIC AORTIC VALVE STENOSIS: ICD-10-CM

## 2023-02-06 DIAGNOSIS — Z78.0 POSTMENOPAUSAL: ICD-10-CM

## 2023-02-06 DIAGNOSIS — R01.1 CARDIAC MURMUR: ICD-10-CM

## 2023-02-06 DIAGNOSIS — E11.9 TYPE 2 DIABETES MELLITUS WITHOUT COMPLICATION, WITHOUT LONG-TERM CURRENT USE OF INSULIN: ICD-10-CM

## 2023-02-06 DIAGNOSIS — L84 PRE-ULCERATIVE CALLUSES: ICD-10-CM

## 2023-02-06 DIAGNOSIS — M79.605 BILATERAL LEG PAIN: ICD-10-CM

## 2023-02-06 DIAGNOSIS — M79.604 BILATERAL LEG PAIN: ICD-10-CM

## 2023-02-06 DIAGNOSIS — I42.1 OBSTRUCTIVE HYPERTROPHIC CARDIOMYOPATHY: ICD-10-CM

## 2023-02-06 PROCEDURE — 99214 PR OFFICE/OUTPT VISIT, EST, LEVL IV, 30-39 MIN: ICD-10-PCS | Mod: S$GLB,,, | Performed by: FAMILY MEDICINE

## 2023-02-06 PROCEDURE — 3288F FALL RISK ASSESSMENT DOCD: CPT | Mod: CPTII,S$GLB,, | Performed by: FAMILY MEDICINE

## 2023-02-06 PROCEDURE — 1126F PR PAIN SEVERITY QUANTIFIED, NO PAIN PRESENT: ICD-10-PCS | Mod: CPTII,S$GLB,, | Performed by: FAMILY MEDICINE

## 2023-02-06 PROCEDURE — 83036 HEMOGLOBIN GLYCOSYLATED A1C: CPT | Performed by: FAMILY MEDICINE

## 2023-02-06 PROCEDURE — 99214 OFFICE O/P EST MOD 30 MIN: CPT | Mod: S$GLB,,, | Performed by: FAMILY MEDICINE

## 2023-02-06 PROCEDURE — 99999 PR PBB SHADOW E&M-EST. PATIENT-LVL V: ICD-10-PCS | Mod: PBBFAC,,, | Performed by: FAMILY MEDICINE

## 2023-02-06 PROCEDURE — 99999 PR PBB SHADOW E&M-EST. PATIENT-LVL V: CPT | Mod: PBBFAC,,, | Performed by: FAMILY MEDICINE

## 2023-02-06 PROCEDURE — 1101F PT FALLS ASSESS-DOCD LE1/YR: CPT | Mod: CPTII,S$GLB,, | Performed by: FAMILY MEDICINE

## 2023-02-06 PROCEDURE — 1126F AMNT PAIN NOTED NONE PRSNT: CPT | Mod: CPTII,S$GLB,, | Performed by: FAMILY MEDICINE

## 2023-02-06 PROCEDURE — 3288F PR FALLS RISK ASSESSMENT DOCUMENTED: ICD-10-PCS | Mod: CPTII,S$GLB,, | Performed by: FAMILY MEDICINE

## 2023-02-06 PROCEDURE — 36415 COLL VENOUS BLD VENIPUNCTURE: CPT | Mod: PO | Performed by: FAMILY MEDICINE

## 2023-02-06 PROCEDURE — 1101F PR PT FALLS ASSESS DOC 0-1 FALLS W/OUT INJ PAST YR: ICD-10-PCS | Mod: CPTII,S$GLB,, | Performed by: FAMILY MEDICINE

## 2023-02-06 RX ORDER — PREDNISONE 20 MG/1
20 TABLET ORAL
COMMUNITY
Start: 2023-02-02 | End: 2023-02-14

## 2023-02-06 NOTE — PROGRESS NOTES
Edie Hammond    Chief Complaint   Patient presents with    Hypertension    Diabetes    Follow-up       History of Present Illness:   Ms. Hammond comes in today for hypertension and diabetes follow-up.  She is not fasting but has taken medications today.  She states she monitors her diet most times.  She states she performs home blood pressure checks 2 to 3 times a week with levels ranging 160/78 and glucose checks twice daily with morning levels ranging 120 is (121 this morning) and evening after meal levels ranging 200's.  She states she now lives at [facility] and states meals are sometimes lightly salted.    She states she was evaluated at [facility] on January 31, 2023 for increased left calf pain of a few hours and states she was told to take extra-strength Tylenol and told to get physical therapy.  She states pain improved over few days.  She states she desires physical therapy for strengthening with [facility], whom she states will come to give her treatment at [facility].    She states she occasionally has swelling at both of her ankles which resolves with elevation.  She states she sometimes wears compression stockings with help.    She states she was again evaluated at [facility] on February 2, 2023 for acute tongue swelling, angioedema.  She states she had acute sore throat with some shortness of breath and was taken to ER for further evaluation.  She was given Benadryl IV during EMS transport and [facility] ER provider advised she see her allergist; she is scheduled to see Dr. Jacqueline Darnell, allergist on February 13, 2023.  She was also given prednisone 20 mg taper over 5 to 6 days (1.5 tablets by mouth daily for 2 days, then 1 tablet daily for 2 days, then 0.5 tablets daily for 2 days) for home use.  She states she feels better.    She follows with Dr. Darren Rai, hematologist/oncologist, for normocytic anemia and is scheduled to see hematologist/oncologist on April 28, 2023 for follow-up of anemia with  CBC.    Otherwise, she denies having fever, chills, fatigue, appetite changes; shortness of breath, cough, wheezing; chest pain, palpitations; abdominal pain, nausea, vomiting, diarrhea, constipation; unusual urinary symptoms; polydipsia, polyphagia, polyuria, hot or cold intolerance; back pain; headache; anxiety, depression, homicidal or suicidal thoughts; bleeding or head trauma.     She follows with Dr. Faisal Milton, cardiologist, for surveillance of nonrheumatic aortic (valve) stenosis, hypertensive heart disease without heart failure, obstructive hypertrophic cardiomyopathy, cardiac murmur, unspecified.       Labs:                      WBC                      8.91                01/23/2023                 HGB                      9.9 (L)             01/23/2023                 HCT                      32.2 (L)            01/23/2023                 PLT                      352                 01/23/2023                 CHOL                     185                 10/06/2022                 TRIG                     93                  10/06/2022                 HDL                      58                  10/06/2022                 ALT                      7 (L)               10/21/2022                 AST                      13                  10/21/2022                 NA                       136                 01/19/2023                 K                        3.8                 01/19/2023                 CL                       99                  01/19/2023                 CREATININE               1.0                 01/19/2023                 BUN                      22                  01/19/2023                 CO2                      27                  01/19/2023                 TSH                      3.004               10/06/2022                 HGBA1C                   5.8 (H)             10/06/2022              LDLCALC                  108.4               10/06/2022                Current  Outpatient Medications   Medication Sig    amLODIPine (NORVASC) 10 MG tablet Take 10 mg by mouth once daily.    apixaban (ELIQUIS) 5 mg Tab Take 1 tablet (5 mg total) by mouth 2 (two) times daily.    blood sugar diagnostic (BLOOD GLUCOSE TEST) Strp Use twice daily prn. Dx: E11.9    bumetanide (BUMEX) 1 MG tablet But also states some times takes 3 times per week    cetirizine (ZYRTEC) 10 MG tablet Take 10 mg by mouth once daily.     cloNIDine (CATAPRES) 0.1 MG tablet Take 0.1 mg by mouth daily as needed (takes only if SBP > 180 per cardiologist).     dorzolamide-timolol 2-0.5% (COSOPT) 22.3-6.8 mg/mL ophthalmic solution 1 drop.    EPINEPHrine (EPIPEN) 0.3 mg/0.3 mL AtIn INJECT 0.3MLS INTO THE MUSCLE ONCE FOR 1 DOSE    ferrous sulfate (FEROSUL) 325 mg (65 mg iron) Tab tablet Take 1 tablet (325 mg total) by mouth once daily.    hydrALAZINE (APRESOLINE) 100 MG tablet Take 100 mg by mouth 3 (three) times daily.    lancets Misc Use twice daily prn. Dx: E11.9    latanoprost 0.005 % ophthalmic solution SMARTSI Drop(s) In Eye(s) Every Evening    levothyroxine (SYNTHROID) 50 MCG tablet TAKE ONE TABLET BY MOUTH ONCE DAILY before breakfast    montelukast (SINGULAIR) 10 mg tablet TAKE ONE TABLET BY MOUTH IN THE EVENING    multivitamin with minerals tablet Take 1 tablet by mouth once daily.     nitroGLYCERIN (NITROSTAT) 0.4 MG SL tablet Place under the tongue.    predniSONE (DELTASONE) 20 MG tablet Take 20 mg by mouth.    SITagliptin phosphate (JANUVIA) 100 MG Tab Take 1 tablet (100 mg total) by mouth once daily.    blood-glucose meter kit Use as instructed - dx: E11.9       Review of Systems   Constitutional:  Positive for activity change. Negative for appetite change, chills, fatigue and fever.        Weight 61 kg (134 lb 7.7 oz) at 2023 visit.   HENT:  Negative for congestion.    Eyes:  Negative for visual disturbance.        See history of present illness.   Respiratory:  Negative for cough, shortness of breath  and wheezing.    Cardiovascular:  Negative for chest pain, palpitations and leg swelling.        See history of present illness.   Gastrointestinal:  Negative for abdominal pain, constipation, diarrhea, nausea and vomiting.   Endocrine: Negative for cold intolerance, heat intolerance, polydipsia, polyphagia and polyuria.        See history of present illness.   Genitourinary:  Negative for difficulty urinating.   Musculoskeletal:  Positive for arthralgias and back pain.        See history of present illness.     Allergic/Immunologic:        See history of present illness.   Neurological:  Negative for weakness and headaches.   Psychiatric/Behavioral:  Positive for sleep disturbance. Negative for confusion, dysphoric mood and suicidal ideas. The patient is not nervous/anxious.         Negative for homicidal ideas. See history of present illness.     Objective:  Physical Exam  Vitals and nursing note reviewed.   Constitutional:       General: She is not in acute distress.     Appearance: Normal appearance. She is well-developed. She is not ill-appearing, toxic-appearing or diaphoretic.   Cardiovascular:      Rate and Rhythm: Normal rate and regular rhythm.      Pulses:           Dorsalis pedis pulses are 3+ on the right side and 3+ on the left side.      Heart sounds: Murmur heard.     No gallop.   Pulmonary:      Effort: Pulmonary effort is normal.      Breath sounds: Normal breath sounds.   Abdominal:      General: Bowel sounds are normal. There is no distension.      Palpations: Abdomen is soft. There is no mass.      Tenderness: There is no abdominal tenderness. There is no guarding or rebound.   Musculoskeletal:         General: Swelling and tenderness present. Normal range of motion.      Cervical back: Normal range of motion and neck supple. No tenderness.      Comments: She is ambulatory with assistance of cane. Swelling of both ankles (left > right) with negative Homans' sign at both lower legs. Non tender at  right great toe but with bruise/callus noted.    Feet:      Right foot:      Protective Sensation: 5 sites tested.  5 sites sensed.      Skin integrity: No ulcer or skin breakdown.      Left foot:      Protective Sensation: 5 sites tested.  5 sites sensed.      Skin integrity: No ulcer or skin breakdown.   Lymphadenopathy:      Cervical: No cervical adenopathy.   Skin:     General: Skin is warm and dry.   Neurological:      Mental Status: She is alert.      Motor: No abnormal muscle tone.   Psychiatric:         Mood and Affect: Mood normal.         Speech: Speech normal.         Behavior: Behavior normal.         Thought Content: Thought content normal.         Judgment: Judgment normal.       ASSESSMENT:  1. Hypertension associated with diabetes    2. Type 2 diabetes mellitus without complication, without long-term current use of insulin    3. Stage 3b chronic kidney disease    4. Obstructive hypertrophic cardiomyopathy    5. Nonrheumatic aortic valve stenosis    6. Cardiac murmur    7. Bilateral leg pain    8. Pre-ulcerative calluses    9. Postmenopausal        PLAN:  Edie was seen today for hypertension, diabetes and follow-up.    Diagnoses and all orders for this visit:    Hypertension associated with diabetes    Type 2 diabetes mellitus without complication, without long-term current use of insulin  -     Hemoglobin A1C; Future    Stage 3b chronic kidney disease    Obstructive hypertrophic cardiomyopathy    Nonrheumatic aortic valve stenosis    Cardiac murmur    Bilateral leg pain  -     Ambulatory referral/consult to Physical/Occupational Therapy; Future    Pre-ulcerative calluses  -     Ambulatory referral/consult to Podiatry; Future    Postmenopausal      Patient advised to call for results.  Continue current medications, follow low sodium, low cholesterol, low carb diet, daily walks.  Keep follow up with specialists.  Continue to wear compression stockings.  Follow up in about 4 months (around 6/6/2023)  for hypertension and diabetes follow up. But, see me sooner if no improvement or worsening symptoms noted.

## 2023-02-07 LAB
ESTIMATED AVG GLUCOSE: 128 MG/DL (ref 68–131)
HBA1C MFR BLD: 6.1 % (ref 4–5.6)

## 2023-02-13 ENCOUNTER — TELEPHONE (OUTPATIENT)
Dept: ALLERGY | Facility: CLINIC | Age: 88
End: 2023-02-13
Payer: MEDICARE

## 2023-02-13 NOTE — TELEPHONE ENCOUNTER
----- Message from Richard Oshea sent at 2/13/2023  2:19 PM CST -----  Contact: Edie  Type:  Patient Returning Call    Who Called:Edie  Who Left Message for Patient:Meaghan Qiu MA   Does the patient know what this is regarding?:scheduling  Would the patient rather a call back or a response via [employer]? Call back  Best Call Back Number:[phone]  Additional Information:

## 2023-02-13 NOTE — TELEPHONE ENCOUNTER
Left message for pt to return our call. She does have a scheduled appt in March that she can keep for follow up.

## 2023-02-14 ENCOUNTER — OFFICE VISIT (OUTPATIENT)
Dept: ALLERGY | Facility: CLINIC | Age: 88
End: 2023-02-14
Payer: MEDICARE

## 2023-02-14 ENCOUNTER — TELEPHONE (OUTPATIENT)
Dept: ALLERGY | Facility: CLINIC | Age: 88
End: 2023-02-14
Payer: MEDICARE

## 2023-02-14 DIAGNOSIS — T78.3XXD ANGIOEDEMA, SUBSEQUENT ENCOUNTER: Primary | ICD-10-CM

## 2023-02-14 PROCEDURE — 1160F PR REVIEW ALL MEDS BY PRESCRIBER/CLIN PHARMACIST DOCUMENTED: ICD-10-PCS | Mod: CPTII,95,, | Performed by: ALLERGY & IMMUNOLOGY

## 2023-02-14 PROCEDURE — 99213 OFFICE O/P EST LOW 20 MIN: CPT | Mod: 95,,, | Performed by: ALLERGY & IMMUNOLOGY

## 2023-02-14 PROCEDURE — 1160F RVW MEDS BY RX/DR IN RCRD: CPT | Mod: CPTII,95,, | Performed by: ALLERGY & IMMUNOLOGY

## 2023-02-14 PROCEDURE — 99213 PR OFFICE/OUTPT VISIT, EST, LEVL III, 20-29 MIN: ICD-10-PCS | Mod: 95,,, | Performed by: ALLERGY & IMMUNOLOGY

## 2023-02-14 PROCEDURE — 1159F PR MEDICATION LIST DOCUMENTED IN MEDICAL RECORD: ICD-10-PCS | Mod: CPTII,95,, | Performed by: ALLERGY & IMMUNOLOGY

## 2023-02-14 PROCEDURE — 1159F MED LIST DOCD IN RCRD: CPT | Mod: CPTII,95,, | Performed by: ALLERGY & IMMUNOLOGY

## 2023-02-14 RX ORDER — FAMOTIDINE 40 MG/1
40 TABLET, FILM COATED ORAL DAILY
Qty: 30 TABLET | Refills: 11 | Status: SHIPPED | OUTPATIENT
Start: 2023-02-14 | End: 2024-03-11

## 2023-02-14 RX ORDER — FAMOTIDINE 40 MG/1
40 TABLET, FILM COATED ORAL DAILY
Qty: 30 TABLET | Refills: 11 | Status: SHIPPED | OUTPATIENT
Start: 2023-02-14 | End: 2023-02-14 | Stop reason: SDUPTHER

## 2023-02-14 NOTE — PROGRESS NOTES
She is unable to sign onto the video visit.    The patient location is: [place]  The chief complaint leading to consultation is: Follow up    Visit type: audio only    Face to Face time with patient: 20 minutes of total time spent on the encounter, which includes face to face time and non-face to face time preparing to see the patient (eg, review of tests), Obtaining and/or reviewing separately obtained history, Documenting clinical information in the electronic or other health record, Independently interpreting results (not separately reported) and communicating results to the patient/family/caregiver, or Care coordination (not separately reported).         Each patient to whom he or she provides medical services by telemedicine is:  (1) informed of the relationship between the physician and patient and the respective role of any other health care provider with respect to management of the patient; and (2) notified that he or she may decline to receive medical services by telemedicine and may withdraw from such care at any time.    Notes:          2/2/2023- tongue swelling- ER- treated with benadryl, prednisone     She is taking cetirizine and Montelukast daily- recommend she continue.     Starting famotidine     Reviewed medications     Recently started on Eliquis for a DVT     Labs ordered- she would like to have them tomorrow.     Epipen 2 pack    RTC 4-6 weeks or sooner, if needed     CORNEL GAN spent a total of 20 minutes on the day of the visit.  This includes face to face time and non-face to face time preparing to see the patient (eg, review of tests), obtaining and/or reviewing separately obtained history, documenting clinical information in the electronic or other health record, independently interpreting results and communicating results to the patient/family/caregiver, or care coordinator.

## 2023-02-14 NOTE — TELEPHONE ENCOUNTER
She is unable to sign onto the video visit.      2/2/2023- tongue swelling- ER- treated with benadryl, prednisone    She is taking cetirizine and Montelukast daily- recommend she continue.    Starting famotidine    Reviewed medications    Recently started on Eliquis for a DVT    Labs ordered- she would like to have them tomorrow.    Epipen 2 pack

## 2023-02-15 ENCOUNTER — TELEPHONE (OUTPATIENT)
Dept: ADMINISTRATIVE | Facility: HOSPITAL | Age: 88
End: 2023-02-15
Payer: MEDICARE

## 2023-02-15 ENCOUNTER — OFFICE VISIT (OUTPATIENT)
Dept: PODIATRY | Facility: CLINIC | Age: 88
End: 2023-02-15
Payer: MEDICARE

## 2023-02-15 ENCOUNTER — LAB VISIT (OUTPATIENT)
Dept: LAB | Facility: HOSPITAL | Age: 88
End: 2023-02-15
Attending: FAMILY MEDICINE
Payer: MEDICARE

## 2023-02-15 VITALS — WEIGHT: 134.06 LBS | BODY MASS INDEX: 27.03 KG/M2 | HEIGHT: 59 IN

## 2023-02-15 DIAGNOSIS — E11.9 ENCOUNTER FOR COMPREHENSIVE DIABETIC FOOT EXAMINATION, TYPE 2 DIABETES MELLITUS: Primary | ICD-10-CM

## 2023-02-15 DIAGNOSIS — T78.3XXD ANGIOEDEMA, SUBSEQUENT ENCOUNTER: ICD-10-CM

## 2023-02-15 DIAGNOSIS — L84 PRE-ULCERATIVE CALLUSES: ICD-10-CM

## 2023-02-15 PROCEDURE — 86334 IMMUNOFIX E-PHORESIS SERUM: CPT | Performed by: ALLERGY & IMMUNOLOGY

## 2023-02-15 PROCEDURE — 99499 UNLISTED E&M SERVICE: CPT | Mod: S$GLB,,, | Performed by: PODIATRIST

## 2023-02-15 PROCEDURE — 1101F PT FALLS ASSESS-DOCD LE1/YR: CPT | Mod: CPTII,S$GLB,, | Performed by: PODIATRIST

## 2023-02-15 PROCEDURE — 1101F PR PT FALLS ASSESS DOC 0-1 FALLS W/OUT INJ PAST YR: ICD-10-PCS | Mod: CPTII,S$GLB,, | Performed by: PODIATRIST

## 2023-02-15 PROCEDURE — 84165 PROTEIN E-PHORESIS SERUM: CPT | Performed by: ALLERGY & IMMUNOLOGY

## 2023-02-15 PROCEDURE — 1159F PR MEDICATION LIST DOCUMENTED IN MEDICAL RECORD: ICD-10-PCS | Mod: CPTII,S$GLB,, | Performed by: PODIATRIST

## 2023-02-15 PROCEDURE — 99999 PR PBB SHADOW E&M-EST. PATIENT-LVL IV: ICD-10-PCS | Mod: PBBFAC,,, | Performed by: PODIATRIST

## 2023-02-15 PROCEDURE — 3288F PR FALLS RISK ASSESSMENT DOCUMENTED: ICD-10-PCS | Mod: CPTII,S$GLB,, | Performed by: PODIATRIST

## 2023-02-15 PROCEDURE — 84165 PATHOLOGIST INTERPRETATION SPE: ICD-10-PCS | Mod: 26,,, | Performed by: PATHOLOGY

## 2023-02-15 PROCEDURE — 1160F PR REVIEW ALL MEDS BY PRESCRIBER/CLIN PHARMACIST DOCUMENTED: ICD-10-PCS | Mod: CPTII,S$GLB,, | Performed by: PODIATRIST

## 2023-02-15 PROCEDURE — 86334 IMMUNOFIX E-PHORESIS SERUM: CPT | Mod: 26,,, | Performed by: PATHOLOGY

## 2023-02-15 PROCEDURE — 82785 ASSAY OF IGE: CPT | Performed by: ALLERGY & IMMUNOLOGY

## 2023-02-15 PROCEDURE — 1160F RVW MEDS BY RX/DR IN RCRD: CPT | Mod: CPTII,S$GLB,, | Performed by: PODIATRIST

## 2023-02-15 PROCEDURE — 99213 OFFICE O/P EST LOW 20 MIN: CPT | Mod: S$GLB,,, | Performed by: PODIATRIST

## 2023-02-15 PROCEDURE — 83883 ASSAY NEPHELOMETRY NOT SPEC: CPT | Performed by: ALLERGY & IMMUNOLOGY

## 2023-02-15 PROCEDURE — 86160 COMPLEMENT ANTIGEN: CPT | Performed by: ALLERGY & IMMUNOLOGY

## 2023-02-15 PROCEDURE — 1125F AMNT PAIN NOTED PAIN PRSNT: CPT | Mod: CPTII,S$GLB,, | Performed by: PODIATRIST

## 2023-02-15 PROCEDURE — 99999 PR PBB SHADOW E&M-EST. PATIENT-LVL IV: CPT | Mod: PBBFAC,,, | Performed by: PODIATRIST

## 2023-02-15 PROCEDURE — 84165 PROTEIN E-PHORESIS SERUM: CPT | Mod: 26,,, | Performed by: PATHOLOGY

## 2023-02-15 PROCEDURE — 86161 COMPLEMENT/FUNCTION ACTIVITY: CPT | Performed by: ALLERGY & IMMUNOLOGY

## 2023-02-15 PROCEDURE — 1125F PR PAIN SEVERITY QUANTIFIED, PAIN PRESENT: ICD-10-PCS | Mod: CPTII,S$GLB,, | Performed by: PODIATRIST

## 2023-02-15 PROCEDURE — 36415 COLL VENOUS BLD VENIPUNCTURE: CPT | Performed by: ALLERGY & IMMUNOLOGY

## 2023-02-15 PROCEDURE — 3288F FALL RISK ASSESSMENT DOCD: CPT | Mod: CPTII,S$GLB,, | Performed by: PODIATRIST

## 2023-02-15 PROCEDURE — 99213 PR OFFICE/OUTPT VISIT, EST, LEVL III, 20-29 MIN: ICD-10-PCS | Mod: S$GLB,,, | Performed by: PODIATRIST

## 2023-02-15 PROCEDURE — 86334 PATHOLOGIST INTERPRETATION IFE: ICD-10-PCS | Mod: 26,,, | Performed by: PATHOLOGY

## 2023-02-15 PROCEDURE — 1159F MED LIST DOCD IN RCRD: CPT | Mod: CPTII,S$GLB,, | Performed by: PODIATRIST

## 2023-02-15 PROCEDURE — 85280 CLOT FACTOR XII HAGEMAN: CPT | Performed by: ALLERGY & IMMUNOLOGY

## 2023-02-16 ENCOUNTER — TELEPHONE (OUTPATIENT)
Dept: ALLERGY | Facility: CLINIC | Age: 88
End: 2023-02-16
Payer: MEDICARE

## 2023-02-16 LAB
ALBUMIN SERPL ELPH-MCNC: 3.33 G/DL (ref 3.35–5.55)
ALPHA1 GLOB SERPL ELPH-MCNC: 0.42 G/DL (ref 0.17–0.41)
ALPHA2 GLOB SERPL ELPH-MCNC: 1.03 G/DL (ref 0.43–0.99)
B-GLOBULIN SERPL ELPH-MCNC: 0.78 G/DL (ref 0.5–1.1)
FACT XII ACT/NOR PPP: 36 % (ref 30–130)
GAMMA GLOB SERPL ELPH-MCNC: 0.84 G/DL (ref 0.67–1.58)
IGE SERPL-ACNC: <35 IU/ML (ref 0–100)
PROT SERPL-MCNC: 6.4 G/DL (ref 6–8.4)

## 2023-02-16 NOTE — TELEPHONE ENCOUNTER
the lab is no longer running C1Q because  is not making new products.Do you want to cancel and do later or freeze sample and hold. They haven't been doing them for 2 months now.Please advise.I spoke to Sunil.            ----- Message from Melissa Batista sent at 2/16/2023 12:44 PM CST -----  Contact: Mary/[facility] lab  Mary with [facility] lab is calling to speak with someone regarding order. Reports patient was drawn for a  lab order and request to clarify order. Please give Mr. Barber or MsMusa Taykiera a call back at [phone] as soon as possible.   Thank you,  GH

## 2023-02-19 LAB — PATHOLOGIST INTERPRETATION SPE: NORMAL

## 2023-02-20 LAB
C1INH SERPL-MCNC: 38 MG/DL (ref 19–37)
INTERPRETATION SERPL IFE-IMP: NORMAL
PATHOLOGIST INTERPRETATION IFE: NORMAL

## 2023-02-21 LAB — C1INH FUNCTIONAL/C1INH TOTAL MFR SERPL: 96 %

## 2023-02-23 DIAGNOSIS — R89.9 ABNORMAL LABORATORY TEST RESULT: Primary | ICD-10-CM

## 2023-03-01 NOTE — PROGRESS NOTES
Subjective:     Patient ID: Edie Hammond is a 89 y.o. female.    Chief Complaint: Callouses (Pt c/o right hallux toe callous 8/10, diabetic pt wears tennis shoes , PCP Dr. Gary lat seen 2-6-23)    Edie is a 89 y.o. female who presents to the clinic upon referral from Dr. Gary  for evaluation and treatment of diabetic feet. Edie has a past medical history of Diabetes, Glaucoma, Hand arthritis, Heart murmur, HTN (hypertension), Hypothyroidism, Intracranial hemorrhage, Postmenopausal, Seasonal allergies, and Type 2 diabetes mellitus. Patient relates no major problem with feet. Only complaints today consist of painful right big toe callus. Patient rates pain 8/10. Patient points to right hallux.    PCP: Erin Gary MD    Date Last Seen by PCP: 02/06/2023    Current shoe gear: Tennis shoes    Hemoglobin A1C   Date Value Ref Range Status   02/06/2023 6.1 (H) 4.0 - 5.6 % Final     Comment:     ADA Screening Guidelines:  5.7-6.4%  Consistent with prediabetes  >or=6.5%  Consistent with diabetes    High levels of fetal hemoglobin interfere with the HbA1C  assay. Heterozygous hemoglobin variants (HbS, HgC, etc)do  not significantly interfere with this assay.   However, presence of multiple variants may affect accuracy.     10/06/2022 5.8 (H) 4.0 - 5.6 % Final     Comment:     ADA Screening Guidelines:  5.7-6.4%  Consistent with prediabetes  >or=6.5%  Consistent with diabetes    High levels of fetal hemoglobin interfere with the HbA1C  assay. Heterozygous hemoglobin variants (HbS, HgC, etc)do  not significantly interfere with this assay.   However, presence of multiple variants may affect accuracy.     04/22/2022 5.9 (H) 4.0 - 5.6 % Final     Comment:     ADA Screening Guidelines:  5.7-6.4%  Consistent with prediabetes  >or=6.5%  Consistent with diabetes    High levels of fetal hemoglobin interfere with the HbA1C  assay. Heterozygous hemoglobin variants (HbS, HgC, etc)do  not significantly interfere with this  assay.   However, presence of multiple variants may affect accuracy.           Patient Active Problem List   Diagnosis    Hypertension associated with diabetes    Postmenopausal    Hypothyroidism    Cardiac murmur    Overweight (BMI 25.0-29.9)    Aortic stenosis    Type 2 diabetes mellitus without complication, without long-term current use of insulin    History of angioedema    Obstructive hypertrophic cardiomyopathy    Primary open angle glaucoma (POAG) of both eyes, severe stage    History of subdural hematoma    Bilateral hearing loss    Normocytic anemia    Stage 3b chronic kidney disease       Medication List with Changes/Refills   Current Medications    AMLODIPINE (NORVASC) 10 MG TABLET    Take 10 mg by mouth once daily.    APIXABAN (ELIQUIS) 5 MG TAB    Take 1 tablet (5 mg total) by mouth 2 (two) times daily.    BLOOD SUGAR DIAGNOSTIC (BLOOD GLUCOSE TEST) STRP    Use twice daily prn. Dx: E11.9    BLOOD-GLUCOSE METER KIT    Use as instructed - dx: E11.9    BUMETANIDE (BUMEX) 1 MG TABLET    But also states some times takes 3 times per week    CETIRIZINE (ZYRTEC) 10 MG TABLET    Take 10 mg by mouth once daily.     CLONIDINE (CATAPRES) 0.1 MG TABLET    Take 0.1 mg by mouth daily as needed (takes only if SBP > 180 per cardiologist).     DORZOLAMIDE-TIMOLOL 2-0.5% (COSOPT) 22.3-6.8 MG/ML OPHTHALMIC SOLUTION    1 drop.    EPINEPHRINE (EPIPEN) 0.3 MG/0.3 ML ATIN    INJECT 0.3MLS INTO THE MUSCLE ONCE FOR 1 DOSE    FAMOTIDINE (PEPCID) 40 MG TABLET    Take 1 tablet (40 mg total) by mouth once daily.    FERROUS SULFATE (FEROSUL) 325 MG (65 MG IRON) TAB TABLET    Take 1 tablet (325 mg total) by mouth once daily.    HYDRALAZINE (APRESOLINE) 100 MG TABLET    Take 100 mg by mouth 3 (three) times daily.    LANCETS MISC    Use twice daily prn. Dx: E11.9    LATANOPROST 0.005 % OPHTHALMIC SOLUTION    SMARTSI Drop(s) In Eye(s) Every Evening    LEVOTHYROXINE (SYNTHROID) 50 MCG TABLET    TAKE ONE TABLET BY MOUTH ONCE DAILY  before breakfast    MONTELUKAST (SINGULAIR) 10 MG TABLET    TAKE ONE TABLET BY MOUTH IN THE EVENING    MULTIVITAMIN WITH MINERALS TABLET    Take 1 tablet by mouth once daily.     NITROGLYCERIN (NITROSTAT) 0.4 MG SL TABLET    Place under the tongue.    SITAGLIPTIN PHOSPHATE (JANUVIA) 100 MG TAB    Take 1 tablet (100 mg total) by mouth once daily.       Review of patient's allergies indicates:   Allergen Reactions    Brimonidine tartrate Itching and Swelling    Losartan Anaphylaxis    Amlodipine besylate Edema     Other reaction(s): swelling of feet    Metformin hcl Diarrhea       Past Surgical History:   Procedure Laterality Date    bladder lift      BREAST BIOPSY      BUNIONECTOMY Bilateral     1970s    CATARACT EXTRACTION Bilateral     1999    CHOLECYSTECTOMY      CRANIOTOMY  2017    s/p fall    FOOT NEUROMA SURGERY Right 1982    R foot - neuroma removed    MYOMECTOMY      TOTAL ABDOMINAL HYSTERECTOMY W/ BILATERAL SALPINGOOPHORECTOMY      due to fibroid       Family History   Problem Relation Age of Onset    No Known Problems Son     Heart attack Mother     Diabetes Sister     Hypertension Sister     Hypertension Sister     Breast cancer Maternal Aunt     Stroke Neg Hx        Social History     Socioeconomic History    Marital status:    Occupational History    Occupation: Retired    Tobacco Use    Smoking status: Never    Smokeless tobacco: Never   Substance and Sexual Activity    Alcohol use: Yes     Comment: red wine rarely    Drug use: Never   Social History Narrative    She wears seatbelt.     Social Determinants of Health     Financial Resource Strain: Low Risk     Difficulty of Paying Living Expenses: Not hard at all   Food Insecurity: No Food Insecurity    Worried About Running Out of Food in the Last Year: Never true    Ran Out of Food in the Last Year: Never true   Transportation Needs: No Transportation Needs    Lack of Transportation (Medical): No    Lack of Transportation  "(Non-Medical): No   Physical Activity: Inactive    Days of Exercise per Week: 0 days    Minutes of Exercise per Session: 0 min   Stress: No Stress Concern Present    Feeling of Stress : Not at all   Social Connections: Moderately Integrated    Frequency of Communication with Friends and Family: More than three times a week    Frequency of Social Gatherings with Friends and Family: Three times a week    Attends Oriental orthodox Services: More than 4 times per year    Active Member of Clubs or Organizations: Yes    Attends Club or Organization Meetings: 1 to 4 times per year    Marital Status:    Housing Stability: Low Risk     Unable to Pay for Housing in the Last Year: No    Number of Places Lived in the Last Year: 1    Unstable Housing in the Last Year: No       Vitals:    02/15/23 1102   Weight: 60.8 kg (134 lb 0.6 oz)   Height: 4' 11" (1.499 m)   PainSc:   8       Hemoglobin A1C   Date Value Ref Range Status   02/06/2023 6.1 (H) 4.0 - 5.6 % Final     Comment:     ADA Screening Guidelines:  5.7-6.4%  Consistent with prediabetes  >or=6.5%  Consistent with diabetes    High levels of fetal hemoglobin interfere with the HbA1C  assay. Heterozygous hemoglobin variants (HbS, HgC, etc)do  not significantly interfere with this assay.   However, presence of multiple variants may affect accuracy.     10/06/2022 5.8 (H) 4.0 - 5.6 % Final     Comment:     ADA Screening Guidelines:  5.7-6.4%  Consistent with prediabetes  >or=6.5%  Consistent with diabetes    High levels of fetal hemoglobin interfere with the HbA1C  assay. Heterozygous hemoglobin variants (HbS, HgC, etc)do  not significantly interfere with this assay.   However, presence of multiple variants may affect accuracy.     04/22/2022 5.9 (H) 4.0 - 5.6 % Final     Comment:     ADA Screening Guidelines:  5.7-6.4%  Consistent with prediabetes  >or=6.5%  Consistent with diabetes    High levels of fetal hemoglobin interfere with the HbA1C  assay. Heterozygous hemoglobin " variants (HbS, HgC, etc)do  not significantly interfere with this assay.   However, presence of multiple variants may affect accuracy.         Review of Systems   Constitutional:  Negative for chills and fever.   Respiratory:  Negative for shortness of breath.    Cardiovascular:  Negative for chest pain, palpitations, orthopnea, claudication and leg swelling.   Gastrointestinal:  Negative for diarrhea, nausea and vomiting.   Musculoskeletal:  Negative for joint pain.   Skin:  Negative for rash.   Neurological:  Positive for tingling. Negative for dizziness, sensory change, focal weakness and weakness.   Psychiatric/Behavioral: Negative.             Objective:      PHYSICAL EXAM: Apperance: Alert and orient in no distress,well developed, and with good attention to grooming and body habits  Patient presents ambulating in tennis shoes.   LOWER EXTREMITY EXAM:  VASCULAR: Dorsalis pedis pulses 1/4 bilateral and Posterior Tibial pulses 1/4 bilateral. Capillary fill time <blanched bilateral. Mild edema observed bilateral. Varicosities present bilateral. Skin temperature of the lower extremities is warm to warm, proximal to distal. Hair growth dim bilateral.  DERMATOLOGICAL: No skin rashes, subcutaneous nodules, lesions, or ulcers observed bilateral. Nails 1,2,3,4,5 bilateral normal length. Webspaces 1,2,3,4 bilateral clean, dry and without evidence of break in skin integrity. Mild hyperkeratotic tissue noted to hallux.   NEUROLOGICAL: Light touch, sharp-dull, proprioception all present and equal bilaterally.  Vibratory sensation diminished at bilateral phalanx. Protective sensation decreased sites as tested with a Davis-Eben 5.07 monofilament.   MUSCULOSKELETAL: Muscle strength is 5/5 for foot inverters, everters, plantarflexors, and dorsiflexors. Muscle tone is normal.       Assessment:       ICD-10-CM ICD-9-CM   1. Pre-ulcerative calluses  L84 700       Plan:   Pre-ulcerative calluses  -     Ambulatory  referral/consult to Podiatry      I counseled the patient on her conditions, regarding findings of my examination, my impressions, and usual treatment plan.   This visit spent on counseling and coordination of care.  Appointment spent on education about the diabetic foot, neuropathy, and prevention of limb loss.  Shoe inspection. Diabetic Foot Education. Patient reminded of the importance of good nutrition and blood sugar control to help prevent podiatric complications of diabetes. Patient instructed on proper foot hygeine. We discussed wearing proper shoe gear, daily foot inspections, never walking without protective shoe gear, never putting sharp instruments to feet.    The patient and I reviewed the types of shoes she should be wearing, my recommendation includes generally the best time of the day for a shoe fitting is the afternoon, shoes with a wide toe box, very good cushion, and tennis shoes with removable inner soles.The patient and I reviewed my recommendations for over-the-counter orthotic inserts.   Patient  will continue to monitor the areas daily, inspect feet, wear protective shoe gear when ambulatory, moisturizer to maintain skin integrity. Patient reminded of the importance of good nutrition and blood sugar control to help prevent podiatric complications of diabetes.  Patient to return 4 weeks or sooner if needed.          Sheryl Hoskins DPM  [facility]

## 2023-03-02 LAB — C1INH SERPL-MCNC: 41 MG/DL (ref 21–39)

## 2023-03-14 ENCOUNTER — TELEPHONE (OUTPATIENT)
Dept: ADMINISTRATIVE | Facility: HOSPITAL | Age: 88
End: 2023-03-14
Payer: MEDICARE

## 2023-03-14 ENCOUNTER — PATIENT MESSAGE (OUTPATIENT)
Dept: ADMINISTRATIVE | Facility: HOSPITAL | Age: 88
End: 2023-03-14
Payer: MEDICARE

## 2023-03-21 ENCOUNTER — TELEPHONE (OUTPATIENT)
Dept: FAMILY MEDICINE | Facility: CLINIC | Age: 88
End: 2023-03-21
Payer: MEDICARE

## 2023-03-21 DIAGNOSIS — R26.89 BALANCE PROBLEM: ICD-10-CM

## 2023-03-21 DIAGNOSIS — R60.0 BILATERAL LEG EDEMA: Primary | ICD-10-CM

## 2023-03-21 NOTE — TELEPHONE ENCOUNTER
----- Message from Gabino Scott sent at 3/21/2023  2:19 PM CDT -----  Contact: Patient  Edie Yohan Hammond would like a call back at [phone], in regards to getting an order sent to [facility] for  Physical Therapy. (Fax# [phone])

## 2023-03-22 NOTE — TELEPHONE ENCOUNTER
Pt states advised PT at her last appt due to her leg swelling and she mention she would like to do PT for balance and flexible and better mobility.

## 2023-03-28 NOTE — TELEPHONE ENCOUNTER
I have put the following orders and/or medications to this note.  Please advise pt and assist. Fax order to [facility] for  Physical Therapy. (Fax# [phone])    Orders Placed This Encounter   Procedures    Ambulatory referral/consult to Physical/Occupational Therapy     Standing Status:   Future     Standing Expiration Date:   4/28/2024     Referral Priority:   Routine     Referral Reason:   Patient Preference     Requested Specialty:   Physical Therapy     Number of Visits Requested:   1

## 2023-03-30 DIAGNOSIS — E11.9 TYPE 2 DIABETES MELLITUS WITHOUT COMPLICATION, WITHOUT LONG-TERM CURRENT USE OF INSULIN: ICD-10-CM

## 2023-03-30 RX ORDER — SITAGLIPTIN 100 MG/1
TABLET, FILM COATED ORAL
Qty: 90 TABLET | Refills: 1 | Status: SHIPPED | OUTPATIENT
Start: 2023-03-30 | End: 2023-09-11

## 2023-03-30 NOTE — TELEPHONE ENCOUNTER
No new care gaps identified.  [facility] Embedded Care Gaps. Reference number: [id number]. 3/30/2023   10:36:14 AM CDT

## 2023-03-30 NOTE — TELEPHONE ENCOUNTER
Refill Decision Note   Edie Hammond  is requesting a refill authorization.  Brief Assessment and Rationale for Refill:  Approve     Medication Therapy Plan:       Medication Reconciliation Completed: No   Comments:     No Care Gaps recommended.     Note composed:3:05 PM 03/30/2023

## 2023-03-30 NOTE — TELEPHONE ENCOUNTER
Refill Routing Note   Medication(s) are not appropriate for processing by [facility] for the following reason(s):      Drug-disease interaction    [facility] action(s):  Defer     Medication Therapy Plan: JANUVIA and Stage 3b chronic kidney disease    Pharmacist review requested: Yes     Appointments  past 12m or future 3m with PCP    Date Provider   Last Visit   2/6/2023 Erin Gary MD   Next Visit   6/7/2023 Erin Gary MD   ED visits in past 90 days: 0        Note composed:2:50 PM 03/30/2023

## 2023-04-26 ENCOUNTER — E-VISIT (OUTPATIENT)
Dept: FAMILY MEDICINE | Facility: CLINIC | Age: 88
End: 2023-04-26
Payer: MEDICARE

## 2023-04-26 ENCOUNTER — TELEPHONE (OUTPATIENT)
Dept: FAMILY MEDICINE | Facility: CLINIC | Age: 88
End: 2023-04-26
Payer: MEDICARE

## 2023-04-26 ENCOUNTER — PATIENT MESSAGE (OUTPATIENT)
Dept: FAMILY MEDICINE | Facility: CLINIC | Age: 88
End: 2023-04-26

## 2023-04-26 DIAGNOSIS — U07.1 COVID: Primary | ICD-10-CM

## 2023-04-26 DIAGNOSIS — R05.1 ACUTE COUGH: ICD-10-CM

## 2023-04-26 PROCEDURE — 99423 OL DIG E/M SVC 21+ MIN: CPT | Mod: 95,,, | Performed by: FAMILY MEDICINE

## 2023-04-26 PROCEDURE — 99423 PR E&M, ONLINE DIGIT, EST, < 7 DAYS,  21+ MINS: ICD-10-PCS | Mod: 95,,, | Performed by: FAMILY MEDICINE

## 2023-04-26 NOTE — TELEPHONE ENCOUNTER
----- Message from Jody Ren sent at 4/26/2023  9:59 AM CDT -----  Contact: Edie Irizarry is calling in regards to test positive for Covid and would like a  Rx paxlovid. Please send it over to   [id number] - [place] - [address] AT [facility]  [address] [place] [address]  Phone: [phone] Fax: [phone]         Thanks  CF

## 2023-04-27 ENCOUNTER — PATIENT MESSAGE (OUTPATIENT)
Dept: FAMILY MEDICINE | Facility: CLINIC | Age: 88
End: 2023-04-27
Payer: MEDICARE

## 2023-04-27 RX ORDER — BENZONATATE 100 MG/1
100 CAPSULE ORAL 3 TIMES DAILY PRN
Qty: 30 CAPSULE | Refills: 0 | Status: SHIPPED | OUTPATIENT
Start: 2023-04-27 | End: 2023-05-07

## 2023-04-27 NOTE — PROGRESS NOTES
Patient ID: Edie Hammond is a 89 y.o. female.    Chief Complaint: COVID-19 Concerns    The patient initiated a request through [facility] on 4/26/2023 for evaluation and management with a chief complaint of COVID-19 Concerns     I evaluated the questionnaire submission on 4/27/2023.    [facility] Peq Evisit Upper Respitatory/Cough Questionnaire    4/26/2023  4:40 PM CDT - Filed by Patient   Do you agree to participate in an E-Visit? Yes   If you have any of the following symptoms, please present to your local ER or call 911:  I acknowledge   What is the main issue that you would like for your doctor to address today? Positive test for Covid   Are you able to take your vital signs? Yes   Systolic Blood Pressure: 154   Diastolic Blood Pressure: 66   Weight: 134   Height: 59   Pulse: 80   Temp: 98   Resp: 0   Pulse Ox: 0   Are you currently pregnant, could you be pregnant, or are you breast feeding? None of the above   What symptoms do you currently have?  Cough;  Fatigue;  Headache;  Nasal Congestion;  Muscle or body aches;  Runny nose   Have you had a fever? No   When did your symptoms first appear? 4/25/2023   In the last two weeks, have you been in close contact with someone who has COVID-19 or the Flu? Yes , Covid-19   In the last two weeks, have you worked or volunteered in a healthcare facility or as a ? Healthcare facilities include a hospital, medical or dental clinic, long-term care facility, or nursing home No   Do you live in a long-term care facility, nursing home, group home, or homeless shelter? No   List what you have done or taken to help your symptoms. Tylenol   How severe are your symptoms? Moderate   Have you taken an at home Covid test? Yes   What were the results? Positive   Have you taken a Flu test? No   Have you been fully vaccinated for COVID? (2 Pfizer, 2 Moderna or 1 Gato & Gato vaccine injections) Yes   Have you received a booster? Yes   Have you recieved a Flu shot?  Yes   When did you recieve your Flu shot? 9/16/2022   Do you have transportation to get tested for COVID if it is indicated and ordered for you at an [facility] location? Yes   Provide any information you feel is important to your history not asked above I am pre-diabetic and have hypertension.   Please attach any relevant images or files          Recent Labs Obtained:  No visits with results within 7 Day(s) from this visit.   Latest known visit with results is:   Lab Visit on 02/15/2023   Component Date Value Ref Range Status    C1 Esterase Inhibitor Antigen 02/15/2023 38 (H)  19 - 37 mg/dL Final    Comment: Test Performed by:  [facility]  [address] [place] [address]  : Wolf Moore M.D. Ph.D.; CLIA# [id number]      Factor XII Activity 02/15/2023 36  30 - 130 % Final    Protein, Serum 02/15/2023 6.4  6.0 - 8.4 g/dL Final    Comment: Serum protein electrophoresis and immunofixation results should be   interpreted in clinical context in that some therapeutic agents can   result   in false positive results (example, daratumumab). Correlation with   the   patient s therapeutic regimen is required.      Albumin 02/15/2023 3.33 (L)  3.35 - 5.55 g/dL Final    Alpha-1 02/15/2023 0.42 (H)  0.17 - 0.41 g/dL Final    Alpha-2 02/15/2023 1.03 (H)  0.43 - 0.99 g/dL Final    Beta 02/15/2023 0.78  0.50 - 1.10 g/dL Final    Gamma 02/15/2023 0.84  0.67 - 1.58 g/dL Final    IgE 02/15/2023 <35  0 - 100 IU/mL Final    C1 Esterase Inhibitor 02/15/2023 41 (H)  21 - 39 mg/dL Final    Comment: A normal C1 esterase inhibitor protein level does not  rule out the possibility of a functional C1 esterase  inhibitor deficiency. Consider further testing of C1  esterase inhibitor functional activity, if clinically  indicated.    Test Performed at:  [facility], [address] [place]  [address] [address]      C1 Esterase  Inhibitor Function 02/15/2023 96  % Final    Comment: Reference Range:  > OR = 68  NORMAL  41-67  EQUIVOCAL  < OR = 40  ABNORMAL    Less than 40% of the reference functional activity indicates  a likely diagnosis of hereditary angioedema or acquired C1  inhibitor deficiency.    For additional information, please refer to  http://education.[employer]/faq/FAQ54  (This link is being provided for informational/educational  purposes only.)  Test Performed at:  [facility] [address]  [address]     GAVI Mendez MD, PhD, KEYONNA      Pathologist Interpretation SPE 02/15/2023 REVIEWED   Final    Comment:   Electronically reviewed and signed by:  Ping Sanchez D.O.  Signed on 02/19/23 at 02:46  Normal total protein.  Faint linear irregularity in gamma = 0.1 g/dL (no previous value).  Correlate with corresponding BEVERLEY result reflexed to assess for   monoclonality.      Immunofix Interp. 02/15/2023 SEE COMMENT   Final    Comment: Serum protein electrophoresis and immunofixation results should be   interpreted in clinical context in that some therapeutic agents can   result   in false positive results (example, daratumumab). Correlation with   the   patient s therapeutic regimen is required.  See pathologist's interpretation.      Pathologist Interpretation BEVERLEY 02/15/2023 REVIEWED   Final    Comment:   Electronically reviewed and signed by:  Griselda Rodríguez MD  Signed on 02/20/23 at 14:58  Faint IgG kappa specific band seen.         Encounter Diagnoses   Name Primary?    COVID Yes    Acute cough         No orders of the defined types were placed in this encounter.     Medications Ordered This Encounter   Medications    benzonatate (TESSALON) 100 MG capsule     Sig: Take 1 capsule (100 mg total) by mouth 3 (three) times daily as needed for Cough.     Dispense:  30 capsule     Refill:  0        I reviewed on Up-To-Date Paxlovid interaction information with therapy  modification advised if used due to Eliquis; however, symptom monitoring advised due to amlodipine and Synthroid.  As patient takes Eliquis for history of DVT and as she has mild symptoms, I recommend she not take Paxlovid due to possible adverse interaction. Instead, I recommend she continues Zyrtec and Singulair for allergy symptoms and take Tessalon Perles for cough. 5-day isolation/quarantine per protocol and may end isolation/quarantine on day 6 if fever-free for 24 hours without use of fever-reducing medication.  Go to ER ASAP if worsening of symptoms noted.    Follow up if symptoms worsen or fail to improve.      E-Visit Time Trackin minutes

## 2023-05-11 ENCOUNTER — HOSPITAL ENCOUNTER (OUTPATIENT)
Dept: RADIOLOGY | Facility: HOSPITAL | Age: 88
Discharge: HOME | End: 2023-05-11
Attending: INTERNAL MEDICINE
Payer: MEDICARE

## 2023-05-11 ENCOUNTER — OFFICE VISIT (OUTPATIENT)
Dept: FAMILY MEDICINE | Facility: CLINIC | Age: 88
End: 2023-05-11
Payer: MEDICARE

## 2023-05-11 VITALS
WEIGHT: 136 LBS | BODY MASS INDEX: 27.47 KG/M2 | TEMPERATURE: 98 F | OXYGEN SATURATION: 97 % | HEART RATE: 62 BPM | DIASTOLIC BLOOD PRESSURE: 60 MMHG | RESPIRATION RATE: 18 BRPM | SYSTOLIC BLOOD PRESSURE: 140 MMHG

## 2023-05-11 DIAGNOSIS — Z86.16 HISTORY OF COVID-19: Primary | ICD-10-CM

## 2023-05-11 DIAGNOSIS — R05.9 COUGH, UNSPECIFIED TYPE: ICD-10-CM

## 2023-05-11 PROCEDURE — 1159F MED LIST DOCD IN RCRD: CPT | Mod: CPTII,S$GLB,, | Performed by: INTERNAL MEDICINE

## 2023-05-11 PROCEDURE — 3288F FALL RISK ASSESSMENT DOCD: CPT | Mod: CPTII,S$GLB,, | Performed by: INTERNAL MEDICINE

## 2023-05-11 PROCEDURE — 99999 PR PBB SHADOW E&M-EST. PATIENT-LVL V: CPT | Mod: PBBFAC,,, | Performed by: INTERNAL MEDICINE

## 2023-05-11 PROCEDURE — 1126F AMNT PAIN NOTED NONE PRSNT: CPT | Mod: CPTII,S$GLB,, | Performed by: INTERNAL MEDICINE

## 2023-05-11 PROCEDURE — 71046 X-RAY EXAM CHEST 2 VIEWS: CPT | Mod: 26,,, | Performed by: RADIOLOGY

## 2023-05-11 PROCEDURE — 99213 OFFICE O/P EST LOW 20 MIN: CPT | Mod: S$GLB,,, | Performed by: INTERNAL MEDICINE

## 2023-05-11 PROCEDURE — 3288F PR FALLS RISK ASSESSMENT DOCUMENTED: ICD-10-PCS | Mod: CPTII,S$GLB,, | Performed by: INTERNAL MEDICINE

## 2023-05-11 PROCEDURE — 71046 XR CHEST PA AND LATERAL: ICD-10-PCS | Mod: 26,,, | Performed by: RADIOLOGY

## 2023-05-11 PROCEDURE — 1159F PR MEDICATION LIST DOCUMENTED IN MEDICAL RECORD: ICD-10-PCS | Mod: CPTII,S$GLB,, | Performed by: INTERNAL MEDICINE

## 2023-05-11 PROCEDURE — 99999 PR PBB SHADOW E&M-EST. PATIENT-LVL V: ICD-10-PCS | Mod: PBBFAC,,, | Performed by: INTERNAL MEDICINE

## 2023-05-11 PROCEDURE — 99213 PR OFFICE/OUTPT VISIT, EST, LEVL III, 20-29 MIN: ICD-10-PCS | Mod: S$GLB,,, | Performed by: INTERNAL MEDICINE

## 2023-05-11 PROCEDURE — 71046 X-RAY EXAM CHEST 2 VIEWS: CPT | Mod: TC,FY,PO

## 2023-05-11 PROCEDURE — 1126F PR PAIN SEVERITY QUANTIFIED, NO PAIN PRESENT: ICD-10-PCS | Mod: CPTII,S$GLB,, | Performed by: INTERNAL MEDICINE

## 2023-05-11 PROCEDURE — 1101F PT FALLS ASSESS-DOCD LE1/YR: CPT | Mod: CPTII,S$GLB,, | Performed by: INTERNAL MEDICINE

## 2023-05-11 PROCEDURE — 1101F PR PT FALLS ASSESS DOC 0-1 FALLS W/OUT INJ PAST YR: ICD-10-PCS | Mod: CPTII,S$GLB,, | Performed by: INTERNAL MEDICINE

## 2023-05-11 RX ORDER — BIMATOPROST 0.1 MG/ML
SOLUTION/ DROPS OPHTHALMIC
COMMUNITY
Start: 2023-04-29

## 2023-05-11 RX ORDER — AZITHROMYCIN 250 MG/1
TABLET, FILM COATED ORAL
Qty: 6 TABLET | Refills: 0 | Status: SHIPPED | OUTPATIENT
Start: 2023-05-11 | End: 2023-05-16

## 2023-05-11 RX ORDER — PROMETHAZINE HYDROCHLORIDE AND DEXTROMETHORPHAN HYDROBROMIDE 6.25; 15 MG/5ML; MG/5ML
5 SYRUP ORAL 2 TIMES DAILY PRN
Qty: 180 ML | Refills: 0 | Status: SHIPPED | OUTPATIENT
Start: 2023-05-11 | End: 2023-05-21

## 2023-05-11 NOTE — PROGRESS NOTES
Subjective:       Patient ID: Edie Hammond is a 89 y.o. female.    Chief Complaint: URI (Had covid 2 weeks ago and can't shake the cough)    Had covid 2 weeks ago-----feels much better-----but still with cough, sinus congestion. No chest pain , no SOB----    URI   Associated symptoms include congestion and coughing. Pertinent negatives include no abdominal pain, chest pain, nausea, sore throat, vomiting or wheezing.   Past Medical History:   Diagnosis Date    Diabetes     Glaucoma     Follows with Dr. James Reeves, ophthalmologist    Hand arthritis     Heart murmur     Follows with Dr. Faisal Milton, cardiology    HTN (hypertension)     Hypothyroidism     Intracranial hemorrhage     Postmenopausal     Seasonal allergies     Type 2 diabetes mellitus      Past Surgical History:   Procedure Laterality Date    bladder lift      BREAST BIOPSY      BUNIONECTOMY Bilateral     1970s    CATARACT EXTRACTION Bilateral     1999    CHOLECYSTECTOMY      CRANIOTOMY  2017    s/p fall    FOOT NEUROMA SURGERY Right 1982    R foot - neuroma removed    MYOMECTOMY      TOTAL ABDOMINAL HYSTERECTOMY W/ BILATERAL SALPINGOOPHORECTOMY      due to fibroid     Family History   Problem Relation Age of Onset    No Known Problems Son     Heart attack Mother     Diabetes Sister     Hypertension Sister     Hypertension Sister     Breast cancer Maternal Aunt     Stroke Neg Hx      Social History     Socioeconomic History    Marital status:    Occupational History    Occupation: Retired    Tobacco Use    Smoking status: Never    Smokeless tobacco: Never   Substance and Sexual Activity    Alcohol use: Yes     Comment: red wine rarely    Drug use: Never   Social History Narrative    She wears seatbelt.     Social Determinants of Health     Financial Resource Strain: Low Risk     Difficulty of Paying Living Expenses: Not hard at all   Food Insecurity: No Food Insecurity    Worried About Running Out of Food in the Last  Year: Never true    Ran Out of Food in the Last Year: Never true   Transportation Needs: No Transportation Needs    Lack of Transportation (Medical): No    Lack of Transportation (Non-Medical): No   Physical Activity: Inactive    Days of Exercise per Week: 0 days    Minutes of Exercise per Session: 10 min   Stress: No Stress Concern Present    Feeling of Stress : Not at all   Social Connections: Unknown    Frequency of Communication with Friends and Family: More than three times a week    Frequency of Social Gatherings with Friends and Family: More than three times a week    Active Member of Clubs or Organizations: Yes    Attends Club or Organization Meetings: More than 4 times per year    Marital Status:    Housing Stability: Low Risk     Unable to Pay for Housing in the Last Year: No    Number of Places Lived in the Last Year: 1    Unstable Housing in the Last Year: No     Review of Systems   Constitutional:  Negative for chills and fever.   HENT:  Positive for congestion. Negative for sore throat.    Respiratory:  Positive for cough. Negative for apnea, choking, chest tightness, shortness of breath, wheezing and stridor.    Cardiovascular:  Negative for chest pain and palpitations.   Gastrointestinal:  Negative for abdominal pain, nausea and vomiting.   Neurological:  Negative for dizziness and weakness.   Psychiatric/Behavioral:  Negative for agitation, behavioral problems and confusion.      Objective:      Physical Exam  Vitals and nursing note reviewed.   Constitutional:       General: She is not in acute distress.     Appearance: Normal appearance. She is well-developed. She is not diaphoretic.   HENT:      Head: Normocephalic and atraumatic.      Mouth/Throat:      Pharynx: No oropharyngeal exudate.   Eyes:      General: No scleral icterus.  Neck:      Thyroid: No thyromegaly.      Vascular: No carotid bruit or JVD.      Trachea: No tracheal deviation.   Cardiovascular:      Rate and Rhythm: Normal  rate and regular rhythm.      Heart sounds: Normal heart sounds.   Pulmonary:      Effort: Pulmonary effort is normal. No respiratory distress.      Breath sounds: Normal breath sounds. No wheezing or rales.   Chest:      Chest wall: No tenderness.   Abdominal:      General: Bowel sounds are normal. There is no distension.      Palpations: Abdomen is soft.      Tenderness: There is no abdominal tenderness. There is no guarding or rebound.   Musculoskeletal:         General: No tenderness. Normal range of motion.      Cervical back: Normal range of motion and neck supple.   Lymphadenopathy:      Cervical: No cervical adenopathy.   Skin:     General: Skin is warm and dry.      Coloration: Skin is not pale.      Findings: No erythema or rash.   Neurological:      Mental Status: She is alert and oriented to person, place, and time.   Psychiatric:         Behavior: Behavior normal.         Thought Content: Thought content normal.         Judgment: Judgment normal.       CMP  Sodium   Date Value Ref Range Status   01/19/2023 136 136 - 145 mmol/L Final     Potassium   Date Value Ref Range Status   01/19/2023 3.8 3.5 - 5.1 mmol/L Final     Chloride   Date Value Ref Range Status   01/19/2023 99 95 - 110 mmol/L Final     CO2   Date Value Ref Range Status   01/19/2023 27 23 - 29 mmol/L Final     Glucose   Date Value Ref Range Status   01/19/2023 172 (H) 70 - 110 mg/dL Final     BUN   Date Value Ref Range Status   01/19/2023 22 10 - 30 mg/dL Final     Creatinine   Date Value Ref Range Status   01/19/2023 1.0 0.5 - 1.4 mg/dL Final     Calcium   Date Value Ref Range Status   01/19/2023 9.2 8.7 - 10.5 mg/dL Final     Total Protein   Date Value Ref Range Status   10/21/2022 7.0 6.0 - 8.4 g/dL Final     Albumin   Date Value Ref Range Status   10/21/2022 3.0 (L) 3.5 - 5.2 g/dL Final     Total Bilirubin   Date Value Ref Range Status   10/21/2022 0.4 0.1 - 1.0 mg/dL Final     Comment:     For infants and newborns, interpretation of  results should be based  on gestational age, weight and in agreement with clinical  observations.    Premature Infant recommended reference ranges:  Up to 24 hours.............<8.0 mg/dL  Up to 48 hours............<12.0 mg/dL  3-5 days..................<15.0 mg/dL  6-29 days.................<15.0 mg/dL       Alkaline Phosphatase   Date Value Ref Range Status   10/21/2022 60 55 - 135 U/L Final     AST   Date Value Ref Range Status   10/21/2022 13 10 - 40 U/L Final     ALT   Date Value Ref Range Status   10/21/2022 7 (L) 10 - 44 U/L Final     Anion Gap   Date Value Ref Range Status   01/19/2023 10 8 - 16 mmol/L Final     eGFR if    Date Value Ref Range Status   06/28/2022 >60.0 >60 mL/min/1.73 m^2 Final     eGFR if non    Date Value Ref Range Status   06/28/2022 >60.0 >60 mL/min/1.73 m^2 Final     Comment:     Calculation used to obtain the estimated glomerular filtration  rate (eGFR) is the CKD-EPI equation.        Lab Results   Component Value Date    WBC 8.91 01/23/2023    HGB 9.9 (L) 01/23/2023    HCT 32.2 (L) 01/23/2023    MCV 93 01/23/2023     01/23/2023     Lab Results   Component Value Date    CHOL 185 10/06/2022     Lab Results   Component Value Date    HDL 58 10/06/2022     Lab Results   Component Value Date    LDLCALC 108.4 10/06/2022     Lab Results   Component Value Date    TRIG 93 10/06/2022     Lab Results   Component Value Date    CHOLHDL 31.4 10/06/2022     Lab Results   Component Value Date    TSH 3.004 10/06/2022     Lab Results   Component Value Date    HGBA1C 6.1 (H) 02/06/2023     Assessment:       1. History of COVID-19    2. Cough, unspecified type        Plan:   History of COVID-19--------resolved-    Cough, unspecified type  -     X-Ray Chest PA And Lateral; Future; Expected date: 05/11/2023    Other orders  -     azithromycin (Z-CHAMP) 250 MG tablet; Take 2 tablets by mouth on day 1; Take 1 tablet by mouth on days 2-5  Dispense: 6 tablet; Refill: 0  -      promethazine-dextromethorphan (PROMETHAZINE-DM) 6.25-15 mg/5 mL Syrp; Take 5 mLs by mouth 2 (two) times daily as needed.  Dispense: 180 mL; Refill: 0      As above-----call if persists-----------go to er for worsening symptoms-

## 2023-05-16 ENCOUNTER — TELEPHONE (OUTPATIENT)
Dept: ADMINISTRATIVE | Facility: HOSPITAL | Age: 88
End: 2023-05-16
Payer: MEDICARE

## 2023-05-17 DIAGNOSIS — T78.3XXA ANGIOEDEMA, INITIAL ENCOUNTER: ICD-10-CM

## 2023-05-18 RX ORDER — MONTELUKAST SODIUM 10 MG/1
TABLET ORAL
Qty: 30 TABLET | Refills: 0 | Status: SHIPPED | OUTPATIENT
Start: 2023-05-18 | End: 2023-06-16

## 2023-05-31 NOTE — TELEPHONE ENCOUNTER
LV-2/6/23  [facility]-2021  Upcoming appt-6/7/23    Pt requesting- hydrALAZINE (APRESOLINE) 100 MG tablet    Last fill-12/21/22

## 2023-06-01 RX ORDER — HYDRALAZINE HYDROCHLORIDE 100 MG/1
TABLET, FILM COATED ORAL
Qty: 270 TABLET | Refills: 0 | Status: SHIPPED | OUTPATIENT
Start: 2023-06-01 | End: 2023-07-04 | Stop reason: SDUPTHER

## 2023-06-07 ENCOUNTER — OFFICE VISIT (OUTPATIENT)
Dept: FAMILY MEDICINE | Facility: CLINIC | Age: 88
End: 2023-06-07
Payer: MEDICARE

## 2023-06-07 ENCOUNTER — LAB VISIT (OUTPATIENT)
Dept: LAB | Facility: HOSPITAL | Age: 88
End: 2023-06-07
Attending: FAMILY MEDICINE
Payer: MEDICARE

## 2023-06-07 VITALS
WEIGHT: 135.81 LBS | RESPIRATION RATE: 18 BRPM | HEIGHT: 59 IN | SYSTOLIC BLOOD PRESSURE: 138 MMHG | HEART RATE: 66 BPM | BODY MASS INDEX: 27.38 KG/M2 | OXYGEN SATURATION: 96 % | TEMPERATURE: 97 F | DIASTOLIC BLOOD PRESSURE: 70 MMHG

## 2023-06-07 DIAGNOSIS — H40.1133 PRIMARY OPEN ANGLE GLAUCOMA (POAG) OF BOTH EYES, SEVERE STAGE: ICD-10-CM

## 2023-06-07 DIAGNOSIS — E11.9 TYPE 2 DIABETES MELLITUS WITHOUT COMPLICATION, WITHOUT LONG-TERM CURRENT USE OF INSULIN: ICD-10-CM

## 2023-06-07 DIAGNOSIS — I15.2 HYPERTENSION ASSOCIATED WITH DIABETES: Primary | ICD-10-CM

## 2023-06-07 DIAGNOSIS — E66.3 OVERWEIGHT (BMI 25.0-29.9): ICD-10-CM

## 2023-06-07 DIAGNOSIS — I42.1 OBSTRUCTIVE HYPERTROPHIC CARDIOMYOPATHY: ICD-10-CM

## 2023-06-07 DIAGNOSIS — N18.32 STAGE 3B CHRONIC KIDNEY DISEASE: ICD-10-CM

## 2023-06-07 DIAGNOSIS — Z78.0 POSTMENOPAUSAL: ICD-10-CM

## 2023-06-07 DIAGNOSIS — E11.59 HYPERTENSION ASSOCIATED WITH DIABETES: Primary | ICD-10-CM

## 2023-06-07 LAB
ALBUMIN/CREAT UR: 1711.1 UG/MG (ref 0–30)
CREAT UR-MCNC: 18 MG/DL (ref 15–325)
ESTIMATED AVG GLUCOSE: 117 MG/DL (ref 68–131)
HBA1C MFR BLD: 5.7 % (ref 4–5.6)
MICROALBUMIN UR DL<=1MG/L-MCNC: 308 UG/ML

## 2023-06-07 PROCEDURE — 99499 RISK ADDL DX/OHS AUDIT: ICD-10-PCS | Mod: S$GLB,,, | Performed by: FAMILY MEDICINE

## 2023-06-07 PROCEDURE — 3288F PR FALLS RISK ASSESSMENT DOCUMENTED: ICD-10-PCS | Mod: CPTII,S$GLB,, | Performed by: FAMILY MEDICINE

## 2023-06-07 PROCEDURE — 99499 UNLISTED E&M SERVICE: CPT | Mod: S$GLB,,, | Performed by: FAMILY MEDICINE

## 2023-06-07 PROCEDURE — 36415 COLL VENOUS BLD VENIPUNCTURE: CPT | Mod: PO | Performed by: FAMILY MEDICINE

## 2023-06-07 PROCEDURE — 1160F RVW MEDS BY RX/DR IN RCRD: CPT | Mod: CPTII,S$GLB,, | Performed by: FAMILY MEDICINE

## 2023-06-07 PROCEDURE — 1101F PR PT FALLS ASSESS DOC 0-1 FALLS W/OUT INJ PAST YR: ICD-10-PCS | Mod: CPTII,S$GLB,, | Performed by: FAMILY MEDICINE

## 2023-06-07 PROCEDURE — 99214 OFFICE O/P EST MOD 30 MIN: CPT | Mod: S$GLB,,, | Performed by: FAMILY MEDICINE

## 2023-06-07 PROCEDURE — 3288F FALL RISK ASSESSMENT DOCD: CPT | Mod: CPTII,S$GLB,, | Performed by: FAMILY MEDICINE

## 2023-06-07 PROCEDURE — 99999 PR PBB SHADOW E&M-EST. PATIENT-LVL V: CPT | Mod: PBBFAC,,, | Performed by: FAMILY MEDICINE

## 2023-06-07 PROCEDURE — 1126F AMNT PAIN NOTED NONE PRSNT: CPT | Mod: CPTII,S$GLB,, | Performed by: FAMILY MEDICINE

## 2023-06-07 PROCEDURE — 1126F PR PAIN SEVERITY QUANTIFIED, NO PAIN PRESENT: ICD-10-PCS | Mod: CPTII,S$GLB,, | Performed by: FAMILY MEDICINE

## 2023-06-07 PROCEDURE — 1159F MED LIST DOCD IN RCRD: CPT | Mod: CPTII,S$GLB,, | Performed by: FAMILY MEDICINE

## 2023-06-07 PROCEDURE — 83036 HEMOGLOBIN GLYCOSYLATED A1C: CPT | Performed by: FAMILY MEDICINE

## 2023-06-07 PROCEDURE — 1101F PT FALLS ASSESS-DOCD LE1/YR: CPT | Mod: CPTII,S$GLB,, | Performed by: FAMILY MEDICINE

## 2023-06-07 PROCEDURE — 1160F PR REVIEW ALL MEDS BY PRESCRIBER/CLIN PHARMACIST DOCUMENTED: ICD-10-PCS | Mod: CPTII,S$GLB,, | Performed by: FAMILY MEDICINE

## 2023-06-07 PROCEDURE — 82570 ASSAY OF URINE CREATININE: CPT | Performed by: FAMILY MEDICINE

## 2023-06-07 PROCEDURE — 99214 PR OFFICE/OUTPT VISIT, EST, LEVL IV, 30-39 MIN: ICD-10-PCS | Mod: S$GLB,,, | Performed by: FAMILY MEDICINE

## 2023-06-07 PROCEDURE — 99999 PR PBB SHADOW E&M-EST. PATIENT-LVL V: ICD-10-PCS | Mod: PBBFAC,,, | Performed by: FAMILY MEDICINE

## 2023-06-07 PROCEDURE — 1159F PR MEDICATION LIST DOCUMENTED IN MEDICAL RECORD: ICD-10-PCS | Mod: CPTII,S$GLB,, | Performed by: FAMILY MEDICINE

## 2023-06-07 NOTE — PROGRESS NOTES
Edie Hammond    Chief Complaint   Patient presents with    Follow-up    Hypertension    Diabetes       History of Present Illness:   Ms. Hammond comes in today for hypertension and diabetes follow-up.  She is not fasting but has taken medications today.  She states she performs home blood pressure checks daily with levels ranging 130-170's/60's and glucose checks daily with morning levels ranging 100-120's (108 this morning).  She states she loves living at [facility] but states meals are sometimes lightly salted and heavy with starches and less vegetables.     She saw Dr. Jacqueline Darnell, allergist, on February 14, 2023 for angioedema s/p 2/2/2023 ER visit for tongue swelling with treatment with benadryl, prednisone. Dr. Darnell advised her to continue taking Cetirizine and Montelukast daily but start taking Famotidine  With Epipen 2 pack given and RTC 4-6 weeks or sooner, if needed.     She follows with Dr. Darren Rai, hematologist/oncologist, for normocytic anemia and was scheduled to see hematologist/oncologist on April 28, 2023 for follow-up of anemia with CBC but has been rescheduled to June 17, 2023 as she had Covid at the end of April 2023.     She follows with Dr. Faisal Milton, cardiologist, for surveillance of nonrheumatic aortic (valve) stenosis, hypertensive heart disease without heart failure, obstructive hypertrophic cardiomyopathy, cardiac murmur, unspecified and states she saw him a few months ago with follow up scheduled for October 2023.    She states she had COVID in early May 2023 and was evaluated by Dr. Isai Gibbons on May 11, 2023 with unremarkable chest x-ray.  She states she continues to have slight mucus in her throat with improved cough.    She mentions having pain in her right lower leg on Saturday but had worn heels on Friday evening.  She states she sometimes has back pain, arthritis pains but stable today.    Otherwise, she denies having fever, chills, fatigue, appetite  changes; shortness of breath, cough, wheezing; chest pain, palpitations, leg swelling; other sinus symptoms; abdominal pain, nausea, vomiting, diarrhea, constipation; unusual urinary symptoms; polydipsia, polyphagia, polyuria, hot or cold intolerance; acute visual changes, numbness, headache; anxiety, depression, homicidal or suicidal thoughts.    She saw Dr. Sidney Reeves, ophthalmologist, March 13, 2023 for glaucoma surveillance.  She saw Dr. Hoskins, podiatrist, February 15, 2023 for pre ulcer callus.         Labs:               WBC                      8.91                01/23/2023                 HGB                      9.9 (L)             01/23/2023                 HCT                      32.2 (L)            01/23/2023                 PLT                      352                 01/23/2023                 CHOL                     185                 10/06/2022                 TRIG                     93                  10/06/2022                 HDL                      58                  10/06/2022                 ALT                      7 (L)               10/21/2022                 AST                      13                  10/21/2022                 NA                       136                 01/19/2023                 K                        3.8                 01/19/2023                 CL                       99                  01/19/2023                 CREATININE               1.0                 01/19/2023                 BUN                      22                  01/19/2023                 CO2                      27                  01/19/2023                 TSH                      3.004               10/06/2022                 HGBA1C                   6.1 (H)             02/06/2023              LDLCALC                  108.4               10/06/2022                Current Outpatient Medications   Medication Sig    amLODIPine (NORVASC) 10 MG tablet Take 10 mg by mouth once daily.     apixaban (ELIQUIS) 5 mg Tab Take 1 tablet (5 mg total) by mouth 2 (two) times daily.    blood sugar diagnostic (BLOOD GLUCOSE TEST) Strp Use twice daily prn. Dx: E11.9    bumetanide (BUMEX) 1 MG tablet But also states some times takes 3 times per week    cetirizine (ZYRTEC) 10 MG tablet Take 10 mg by mouth once daily.     cloNIDine (CATAPRES) 0.1 MG tablet Take 0.1 mg by mouth daily as needed (takes only if SBP > 180 per cardiologist).     dorzolamide-timolol 2-0.5% (COSOPT) 22.3-6.8 mg/mL ophthalmic solution 1 drop.    EPINEPHrine (EPIPEN) 0.3 mg/0.3 mL AtIn INJECT 0.3MLS INTO THE MUSCLE ONCE FOR 1 DOSE    famotidine (PEPCID) 40 MG tablet Take 1 tablet (40 mg total) by mouth once daily.    ferrous sulfate (FEROSUL) 325 mg (65 mg iron) Tab tablet Take 1 tablet (325 mg total) by mouth once daily.    hydrALAZINE (APRESOLINE) 100 MG tablet TAKE ONE TABLET BY MOUTH THREE TIMES DAILY    JANUVIA 100 mg Tab TAKE ONE TABLET BY MOUTH ONCE DAILY    lancets Misc Use twice daily prn. Dx: E11.9    levothyroxine (SYNTHROID) 50 MCG tablet TAKE ONE TABLET BY MOUTH ONCE DAILY before breakfast    LUMIGAN 0.01 % Drop Place into both eyes.    montelukast (SINGULAIR) 10 mg tablet TAKE ONE TABLET BY MOUTH IN THE EVENING    multivitamin with minerals tablet Take 1 tablet by mouth once daily.     nitroGLYCERIN (NITROSTAT) 0.4 MG SL tablet Place under the tongue.    blood-glucose meter kit Use as instructed - dx: E11.9     Review of Systems   Constitutional:  Negative for activity change, appetite change, chills, fatigue and fever.        Weight 60.8 kg (134 lb 0.6 oz) at February 6, 2023 visit.   HENT:  Negative for congestion.         See history of present illness.   Eyes:  Negative for visual disturbance.        See history of present illness.   Respiratory:  Negative for cough, shortness of breath and wheezing.    Cardiovascular:  Negative for chest pain, palpitations and leg swelling.        See history of present illness.    Gastrointestinal:  Negative for abdominal pain, constipation, diarrhea, nausea and vomiting.   Endocrine: Negative for cold intolerance, heat intolerance, polydipsia, polyphagia and polyuria.        See history of present illness.   Genitourinary:  Negative for difficulty urinating.   Musculoskeletal:  Positive for arthralgias and back pain.        See history of present illness.     Allergic/Immunologic:        See history of present illness.   Neurological:  Negative for weakness and headaches.   Hematological:         See history of present illness.   Psychiatric/Behavioral:  Negative for confusion, dysphoric mood and suicidal ideas. The patient is not nervous/anxious.         Negative for homicidal ideas.      Objective:  Physical Exam  Vitals and nursing note reviewed.   Constitutional:       General: She is not in acute distress.     Appearance: Normal appearance. She is well-developed. She is not ill-appearing, toxic-appearing or diaphoretic.   Cardiovascular:      Rate and Rhythm: Normal rate and regular rhythm.      Pulses:           Dorsalis pedis pulses are 3+ on the right side and 3+ on the left side.      Heart sounds: Murmur heard.     No gallop.   Pulmonary:      Effort: Pulmonary effort is normal.      Breath sounds: Normal breath sounds.   Abdominal:      General: Bowel sounds are normal. There is no distension.      Palpations: Abdomen is soft. There is no mass.      Tenderness: There is no abdominal tenderness. There is no guarding or rebound.   Musculoskeletal:         General: No swelling or tenderness. Normal range of motion.      Cervical back: Normal range of motion and neck supple. No tenderness.      Comments: She is ambulatory with assistance of cane.    Feet:      Right foot:      Protective Sensation: 5 sites tested.  5 sites sensed.      Skin integrity: No ulcer or skin breakdown.      Left foot:      Protective Sensation: 5 sites tested.  5 sites sensed.      Skin integrity: No ulcer  or skin breakdown.   Lymphadenopathy:      Cervical: No cervical adenopathy.   Skin:     General: Skin is warm and dry.   Neurological:      Mental Status: She is alert.      Motor: No abnormal muscle tone.   Psychiatric:         Mood and Affect: Mood normal.         Speech: Speech normal.         Behavior: Behavior normal.         Thought Content: Thought content normal.         Judgment: Judgment normal.     ASSESSMENT:  1. Hypertension associated with diabetes    2. Type 2 diabetes mellitus without complication, without long-term current use of insulin    3. Obstructive hypertrophic cardiomyopathy    4. Stage 3b chronic kidney disease    5. Primary open angle glaucoma (POAG) of both eyes, severe stage    6. Overweight (BMI 25.0-29.9)    7. Postmenopausal        PLAN:  Edie was seen today for follow-up, hypertension and diabetes.    Diagnoses and all orders for this visit:    Hypertension associated with diabetes    Type 2 diabetes mellitus without complication, without long-term current use of insulin  -     Hemoglobin A1C; Future  -     Microalbumin/Creatinine Ratio, Urine    Obstructive hypertrophic cardiomyopathy    Stage 3b chronic kidney disease    Primary open angle glaucoma (POAG) of both eyes, severe stage    Overweight (BMI 25.0-29.9)    Postmenopausal      Patient advised to call for results.  Continue current medications, follow low sodium, low cholesterol, low carb diet, daily walks.  Keep follow up with specialists.  Flu shot this fall.  Follow up in about 4 months (around 10/6/2023) for physical.

## 2023-06-16 ENCOUNTER — OFFICE VISIT (OUTPATIENT)
Dept: HEMATOLOGY/ONCOLOGY | Facility: CLINIC | Age: 88
End: 2023-06-16
Payer: MEDICARE

## 2023-06-16 ENCOUNTER — LAB VISIT (OUTPATIENT)
Dept: LAB | Facility: HOSPITAL | Age: 88
End: 2023-06-16
Attending: FAMILY MEDICINE
Payer: MEDICARE

## 2023-06-16 VITALS
TEMPERATURE: 97 F | HEART RATE: 58 BPM | RESPIRATION RATE: 20 BRPM | WEIGHT: 136.69 LBS | SYSTOLIC BLOOD PRESSURE: 140 MMHG | HEIGHT: 59 IN | DIASTOLIC BLOOD PRESSURE: 60 MMHG | BODY MASS INDEX: 27.56 KG/M2

## 2023-06-16 DIAGNOSIS — I15.2 HYPERTENSION ASSOCIATED WITH DIABETES: Primary | ICD-10-CM

## 2023-06-16 DIAGNOSIS — R00.1 BRADYCARDIA: ICD-10-CM

## 2023-06-16 DIAGNOSIS — D64.9 NORMOCYTIC ANEMIA: ICD-10-CM

## 2023-06-16 DIAGNOSIS — T78.3XXA ANGIOEDEMA, INITIAL ENCOUNTER: ICD-10-CM

## 2023-06-16 DIAGNOSIS — E11.59 HYPERTENSION ASSOCIATED WITH DIABETES: Primary | ICD-10-CM

## 2023-06-16 DIAGNOSIS — Z86.718 HISTORY OF DVT (DEEP VEIN THROMBOSIS): ICD-10-CM

## 2023-06-16 DIAGNOSIS — R60.1 GENERALIZED EDEMA: ICD-10-CM

## 2023-06-16 LAB
BASOPHILS # BLD AUTO: 0.08 K/UL (ref 0–0.2)
BASOPHILS NFR BLD: 0.8 % (ref 0–1.9)
DIFFERENTIAL METHOD: ABNORMAL
EOSINOPHIL # BLD AUTO: 0.2 K/UL (ref 0–0.5)
EOSINOPHIL NFR BLD: 1.9 % (ref 0–8)
ERYTHROCYTE [DISTWIDTH] IN BLOOD BY AUTOMATED COUNT: 14.9 % (ref 11.5–14.5)
HCT VFR BLD AUTO: 32.6 % (ref 37–48.5)
HGB BLD-MCNC: 10.8 G/DL (ref 12–16)
IMM GRANULOCYTES # BLD AUTO: 0.03 K/UL (ref 0–0.04)
IMM GRANULOCYTES NFR BLD AUTO: 0.3 % (ref 0–0.5)
LYMPHOCYTES # BLD AUTO: 1.9 K/UL (ref 1–4.8)
LYMPHOCYTES NFR BLD: 20.3 % (ref 18–48)
MCH RBC QN AUTO: 29.2 PG (ref 27–31)
MCHC RBC AUTO-ENTMCNC: 33.1 G/DL (ref 32–36)
MCV RBC AUTO: 88 FL (ref 82–98)
MONOCYTES # BLD AUTO: 1 K/UL (ref 0.3–1)
MONOCYTES NFR BLD: 10.4 % (ref 4–15)
NEUTROPHILS # BLD AUTO: 6.3 K/UL (ref 1.8–7.7)
NEUTROPHILS NFR BLD: 66.3 % (ref 38–73)
NRBC BLD-RTO: 0 /100 WBC
PLATELET # BLD AUTO: 349 K/UL (ref 150–450)
PMV BLD AUTO: 8.9 FL (ref 9.2–12.9)
RBC # BLD AUTO: 3.7 M/UL (ref 4–5.4)
WBC # BLD AUTO: 9.5 K/UL (ref 3.9–12.7)

## 2023-06-16 PROCEDURE — 1159F MED LIST DOCD IN RCRD: CPT | Mod: CPTII,S$GLB,, | Performed by: INTERNAL MEDICINE

## 2023-06-16 PROCEDURE — 99999 PR PBB SHADOW E&M-EST. PATIENT-LVL IV: CPT | Mod: PBBFAC,,, | Performed by: INTERNAL MEDICINE

## 2023-06-16 PROCEDURE — 1159F PR MEDICATION LIST DOCUMENTED IN MEDICAL RECORD: ICD-10-PCS | Mod: CPTII,S$GLB,, | Performed by: INTERNAL MEDICINE

## 2023-06-16 PROCEDURE — 1101F PT FALLS ASSESS-DOCD LE1/YR: CPT | Mod: CPTII,S$GLB,, | Performed by: INTERNAL MEDICINE

## 2023-06-16 PROCEDURE — 36415 COLL VENOUS BLD VENIPUNCTURE: CPT | Performed by: STUDENT IN AN ORGANIZED HEALTH CARE EDUCATION/TRAINING PROGRAM

## 2023-06-16 PROCEDURE — 85025 COMPLETE CBC W/AUTO DIFF WBC: CPT | Performed by: STUDENT IN AN ORGANIZED HEALTH CARE EDUCATION/TRAINING PROGRAM

## 2023-06-16 PROCEDURE — 1126F PR PAIN SEVERITY QUANTIFIED, NO PAIN PRESENT: ICD-10-PCS | Mod: CPTII,S$GLB,, | Performed by: INTERNAL MEDICINE

## 2023-06-16 PROCEDURE — 99499 RISK ADDL DX/OHS AUDIT: ICD-10-PCS | Mod: S$GLB,,, | Performed by: INTERNAL MEDICINE

## 2023-06-16 PROCEDURE — 99499 UNLISTED E&M SERVICE: CPT | Mod: S$GLB,,, | Performed by: INTERNAL MEDICINE

## 2023-06-16 PROCEDURE — 3288F PR FALLS RISK ASSESSMENT DOCUMENTED: ICD-10-PCS | Mod: CPTII,S$GLB,, | Performed by: INTERNAL MEDICINE

## 2023-06-16 PROCEDURE — 1160F RVW MEDS BY RX/DR IN RCRD: CPT | Mod: CPTII,S$GLB,, | Performed by: INTERNAL MEDICINE

## 2023-06-16 PROCEDURE — 1160F PR REVIEW ALL MEDS BY PRESCRIBER/CLIN PHARMACIST DOCUMENTED: ICD-10-PCS | Mod: CPTII,S$GLB,, | Performed by: INTERNAL MEDICINE

## 2023-06-16 PROCEDURE — 1101F PR PT FALLS ASSESS DOC 0-1 FALLS W/OUT INJ PAST YR: ICD-10-PCS | Mod: CPTII,S$GLB,, | Performed by: INTERNAL MEDICINE

## 2023-06-16 PROCEDURE — 99215 OFFICE O/P EST HI 40 MIN: CPT | Mod: S$GLB,,, | Performed by: INTERNAL MEDICINE

## 2023-06-16 PROCEDURE — 1126F AMNT PAIN NOTED NONE PRSNT: CPT | Mod: CPTII,S$GLB,, | Performed by: INTERNAL MEDICINE

## 2023-06-16 PROCEDURE — 99999 PR PBB SHADOW E&M-EST. PATIENT-LVL IV: ICD-10-PCS | Mod: PBBFAC,,, | Performed by: INTERNAL MEDICINE

## 2023-06-16 PROCEDURE — 99215 PR OFFICE/OUTPT VISIT, EST, LEVL V, 40-54 MIN: ICD-10-PCS | Mod: S$GLB,,, | Performed by: INTERNAL MEDICINE

## 2023-06-16 PROCEDURE — 3288F FALL RISK ASSESSMENT DOCD: CPT | Mod: CPTII,S$GLB,, | Performed by: INTERNAL MEDICINE

## 2023-06-16 RX ORDER — DOXAZOSIN 1 MG/1
TABLET ORAL
COMMUNITY
Start: 2023-05-23 | End: 2023-07-25

## 2023-06-16 RX ORDER — COVID-19 ANTIGEN TEST
KIT MISCELLANEOUS
COMMUNITY
Start: 2023-05-11 | End: 2023-08-08

## 2023-06-16 RX ORDER — MONTELUKAST SODIUM 10 MG/1
TABLET ORAL
Qty: 30 TABLET | Refills: 1 | Status: SHIPPED | OUTPATIENT
Start: 2023-06-16 | End: 2023-08-11

## 2023-06-16 NOTE — PROGRESS NOTES
"Subjective     Patient ID: Edie Hammond is a 89 y.o. female.    Chief Complaint: No chief complaint on file.    HPI    This is a 89 year old woman with HTN, DM, Hypothyroidism, Hx DVT and anemia.    Our team originally met her in December of 2022 (Dr. Rai) for an evaluation of anemia  Per this assessment:  -continue oral iron supplementation indefinitely  --elevated ferritin is reactive with her known ESR/CRP elevation  -repeat labs CBC and follow up in 4 months  -discussed with her that if her anemia worsens (ie. Hg trend below 8) or if she develops new cytopenias, would need to consider bone marrow biopsy    Following this visit she had a new heme consultation issue: LLE DVT and was placed on Eliquis  Work-up  She had a negative mammogram in 2021 (had not required continued screening with age)  I do not see a hypercoagulable work up in labs     - 1/19/2023 Bilateral Extremity Ultrasound:  RIGHT: There is normal compressibility of the common femoral, femoral, popliteal, and portions of the calf veins.  There is normal spontaneous and phasic variation throughout the leg by spectral Doppler.  LEFT: Left common femoral vein is patent compressible.  Partial thrombosis seen within the left femoral vein which appears non-expanded and likely subacute to chronic or chronic with some superimposed acute component.  Collaterals are seen. Popliteal vein appears patent with some wall adherent clot.  Posterior tibial, anterior tibial and peroneal veins are all patent.  Impression:  Partial thrombosis seen within the left femoral vein which appears non-expanded and likely subacute to chronic or chronic with some superimposed acute component.    Of note:  - 4/10/2022 CTA Chest:  No CTA evidence of pulmonary thromboembolism.      Today she reports her energy level is "quite good"  Sometimes notes awakening early   Able to manage all ADLs- states lives in a [facility] community and plays bridge there - plans to use an " exercise room    PMH:  Listed but please note prior subdural hematoma post a fall required surgery in 2017    Review of Systems   Constitutional:  Negative for activity change, appetite change, chills, fever and unexpected weight change.   HENT:  Negative for sore throat and trouble swallowing.    Respiratory:  Negative for cough, shortness of breath and wheezing.    Cardiovascular:  Positive for leg swelling (on left, mild). Negative for chest pain and palpitations.   Gastrointestinal:  Negative for abdominal pain and blood in stool.   Genitourinary:  Negative for hematuria.   Neurological:  Negative for dizziness and headaches.   Hematological:  Does not bruise/bleed easily.        Objective     Physical Exam  Vitals and nursing note reviewed.   Constitutional:       General: She is not in acute distress.     Appearance: Normal appearance. She is not ill-appearing.      Comments: Presents with her friend  Very pleasant   HENT:      Head: Normocephalic and atraumatic.   Eyes:      General: No scleral icterus.     Extraocular Movements: Extraocular movements intact.      Conjunctiva/sclera: Conjunctivae normal.      Pupils: Pupils are equal, round, and reactive to light.   Cardiovascular:      Rate and Rhythm: Normal rate and regular rhythm.      Heart sounds: Murmur heard.      Comments: Mild bradycardia noted  Pulmonary:      Effort: No respiratory distress.      Breath sounds: No wheezing, rhonchi or rales.   Abdominal:      General: Abdomen is flat.      Palpations: Abdomen is soft. There is no mass.      Tenderness: There is no abdominal tenderness. There is no guarding or rebound.   Musculoskeletal:         General: Normal range of motion.      Cervical back: Normal range of motion and neck supple. No rigidity.      Right lower leg: No edema.      Left lower leg: Edema present.   Lymphadenopathy:      Cervical: No cervical adenopathy.   Skin:     General: Skin is warm and dry.      Coloration: Skin is not  jaundiced.      Findings: No bruising, erythema, lesion or rash.   Neurological:      General: No focal deficit present.      Mental Status: She is alert and oriented to person, place, and time.      Sensory: No sensory deficit.      Motor: No weakness.      Gait: Gait normal.   Psychiatric:         Mood and Affect: Mood normal.         Behavior: Behavior normal.         Thought Content: Thought content normal.         Judgment: Judgment normal.          Assessment and Plan     1. Hypertension associated with diabetes    2. History of DVT (deep vein thrombosis)    3. Bradycardia    4. Normocytic anemia      HTN/DM- managed with PCP  HTN also managed with her cardiologist  We discussed mild bradycardia and recommend she reach out to discuss as her appt is not until 9/2023- she will as he is outside system    History of DVT  Reviewed 1/19/2023 Ultrasound  Left partial thrombosis seen within the left femoral vein which appears non-expanded and likely subacute to chronic or chronic with some superimposed acute component.  Collaterals are seen. Popliteal vein appears patent with some wall adherent clot.  Posterior tibial, anterior tibial and peroneal veins are all patent.  Impression:  Partial thrombosis seen within the left femoral vein which appears non-expanded and likely subacute to chronic or chronic with some superimposed acute component.  She is on Eliquis and tolerating   Reviewed to call for bleeding, abnormal bruising or falls impacting joint or head or causing bleeding and to report to ED  We will re-assess clot at 6 months (end of July) with another Ultrasound and d-dimer and hypercoag work up    Anemia  10/2022 labs did confirm iron deficiency and she is on oral iron  Presumably further GI testing avoided with scopes due to age and no findings of active bleeding  We will recheck stores and discuss extent of work up possibilities at July visit    30 minutes spent with patient and 60 minutes total for chart  review    Route Chart for Scheduling    Med Onc Chart Routing      Follow up with physician . See end of July or early August with labs and ultrasound 1 week prior   Follow up with [facility]    Infusion scheduling note    Injection scheduling note    Labs    Imaging    Pharmacy appointment    Other referrals

## 2023-07-03 NOTE — TELEPHONE ENCOUNTER
Refill Routing Note   Medication(s) are not appropriate for processing by [facility] for the following reason(s):      Medication outside of protocol    [facility] action(s):  Route Care Due:  None identified          Appointments  past 12m or future 3m with PCP    Date Provider   Last Visit   6/7/2023 Erin Gary MD   Next Visit   10/9/2023 Erin Gary MD   ED visits in past 90 days: 0        Note composed:8:52 AM 07/03/2023

## 2023-07-04 RX ORDER — HYDRALAZINE HYDROCHLORIDE 100 MG/1
TABLET, FILM COATED ORAL
Qty: 270 TABLET | Refills: 0 | Status: SHIPPED | OUTPATIENT
Start: 2023-07-04

## 2023-07-25 ENCOUNTER — OFFICE VISIT (OUTPATIENT)
Dept: FAMILY MEDICINE | Facility: CLINIC | Age: 88
End: 2023-07-25
Attending: FAMILY MEDICINE
Payer: MEDICARE

## 2023-07-25 VITALS
WEIGHT: 138 LBS | HEART RATE: 68 BPM | HEIGHT: 59 IN | BODY MASS INDEX: 27.82 KG/M2 | TEMPERATURE: 98 F | DIASTOLIC BLOOD PRESSURE: 66 MMHG | SYSTOLIC BLOOD PRESSURE: 138 MMHG | OXYGEN SATURATION: 95 % | RESPIRATION RATE: 18 BRPM

## 2023-07-25 DIAGNOSIS — R09.89 RUNNY NOSE: ICD-10-CM

## 2023-07-25 DIAGNOSIS — I42.1 OBSTRUCTIVE HYPERTROPHIC CARDIOMYOPATHY: ICD-10-CM

## 2023-07-25 DIAGNOSIS — I35.0 NONRHEUMATIC AORTIC VALVE STENOSIS: ICD-10-CM

## 2023-07-25 DIAGNOSIS — R05.9 COUGH, UNSPECIFIED TYPE: Primary | ICD-10-CM

## 2023-07-25 DIAGNOSIS — E11.59 HYPERTENSION ASSOCIATED WITH DIABETES: ICD-10-CM

## 2023-07-25 DIAGNOSIS — E11.9 TYPE 2 DIABETES MELLITUS WITHOUT COMPLICATION, WITHOUT LONG-TERM CURRENT USE OF INSULIN: ICD-10-CM

## 2023-07-25 DIAGNOSIS — I15.2 HYPERTENSION ASSOCIATED WITH DIABETES: ICD-10-CM

## 2023-07-25 PROCEDURE — 3288F FALL RISK ASSESSMENT DOCD: CPT | Mod: CPTII,S$GLB,, | Performed by: FAMILY MEDICINE

## 2023-07-25 PROCEDURE — 1159F PR MEDICATION LIST DOCUMENTED IN MEDICAL RECORD: ICD-10-PCS | Mod: CPTII,S$GLB,, | Performed by: FAMILY MEDICINE

## 2023-07-25 PROCEDURE — 1160F RVW MEDS BY RX/DR IN RCRD: CPT | Mod: CPTII,S$GLB,, | Performed by: FAMILY MEDICINE

## 2023-07-25 PROCEDURE — 1160F PR REVIEW ALL MEDS BY PRESCRIBER/CLIN PHARMACIST DOCUMENTED: ICD-10-PCS | Mod: CPTII,S$GLB,, | Performed by: FAMILY MEDICINE

## 2023-07-25 PROCEDURE — 99999 PR PBB SHADOW E&M-EST. PATIENT-LVL V: ICD-10-PCS | Mod: PBBFAC,,, | Performed by: FAMILY MEDICINE

## 2023-07-25 PROCEDURE — 99214 OFFICE O/P EST MOD 30 MIN: CPT | Mod: S$GLB,,, | Performed by: FAMILY MEDICINE

## 2023-07-25 PROCEDURE — 1101F PR PT FALLS ASSESS DOC 0-1 FALLS W/OUT INJ PAST YR: ICD-10-PCS | Mod: CPTII,S$GLB,, | Performed by: FAMILY MEDICINE

## 2023-07-25 PROCEDURE — 1126F AMNT PAIN NOTED NONE PRSNT: CPT | Mod: CPTII,S$GLB,, | Performed by: FAMILY MEDICINE

## 2023-07-25 PROCEDURE — 99999 PR PBB SHADOW E&M-EST. PATIENT-LVL V: CPT | Mod: PBBFAC,,, | Performed by: FAMILY MEDICINE

## 2023-07-25 PROCEDURE — 1126F PR PAIN SEVERITY QUANTIFIED, NO PAIN PRESENT: ICD-10-PCS | Mod: CPTII,S$GLB,, | Performed by: FAMILY MEDICINE

## 2023-07-25 PROCEDURE — 3288F PR FALLS RISK ASSESSMENT DOCUMENTED: ICD-10-PCS | Mod: CPTII,S$GLB,, | Performed by: FAMILY MEDICINE

## 2023-07-25 PROCEDURE — 1159F MED LIST DOCD IN RCRD: CPT | Mod: CPTII,S$GLB,, | Performed by: FAMILY MEDICINE

## 2023-07-25 PROCEDURE — 1101F PT FALLS ASSESS-DOCD LE1/YR: CPT | Mod: CPTII,S$GLB,, | Performed by: FAMILY MEDICINE

## 2023-07-25 PROCEDURE — 99214 PR OFFICE/OUTPT VISIT, EST, LEVL IV, 30-39 MIN: ICD-10-PCS | Mod: S$GLB,,, | Performed by: FAMILY MEDICINE

## 2023-07-25 RX ORDER — HYDROCODONE POLISTIREX AND CHLORPHENIRAMINE POLISTIREX 10; 8 MG/5ML; MG/5ML
5 SUSPENSION, EXTENDED RELEASE ORAL EVERY 12 HOURS PRN
Qty: 100 ML | Refills: 0 | Status: SHIPPED | OUTPATIENT
Start: 2023-07-25 | End: 2024-02-27 | Stop reason: SDUPTHER

## 2023-07-25 NOTE — PROGRESS NOTES
Edie Hammond    Chief Complaint   Patient presents with    Cough       History of Present Illness:   Ms. Hammond comes in today with several complaints.    She is a nonsmoker.  She complains of having cough with runny nose for 2 weeks.  She states cough keeps her awake and sometimes causes her to gag, upchuck.  She states she takes liquid cough medication (promethazine-DM) without help.  She saw Dr. Isai Gibbons on May 11, 2023 with cough and status post COVID infection and was treated with Z-Juan, promethazine-DM with unremarkable chest x-ray with resolution of symptoms.    She states home glucose levels range 116-120's as she checks every morning (fasting) and after dinner.    She states she continues to follow with Dr. Lemus, cardiologist, for surveillance of hypertension, aortic stenosis, cardiac murmur, obstructive hypertrophic cardiomyopathy and states he advised her to increase clonidine to twice daily for blood pressure control at her last visit with him. She states clonidine causes her to have constipation.  She states she noticed 1 week ago bright red blood in the toilet x1; however, she states she had taking laxative during that time.  She states she has not noticed another episode of blood in the toilet.    Otherwise, she denies having fever, chills, fatigue, appetite changes; shortness of breath, wheezing; chest pain, palpitations, leg swelling; other sinus symptoms; abdominal pain, nausea, vomiting, diarrhea; unusual urinary symptoms; polydipsia, polyphagia, polyuria, hot or cold intolerance; back pain; numbness, headache; anxiety, depression, homicidal or suicidal thoughts.      She states she has taken blood pressure medication this morning.      Labs:       Component                Value               Date                       WBC                      9.50                06/16/2023                 HGB                      10.8 (L)            06/16/2023                 HCT                       32.6 (L)            06/16/2023                 PLT                      349                 06/16/2023                 CHOL                     185                 10/06/2022                 TRIG                     93                  10/06/2022                 HDL                      58                  10/06/2022                 ALT                      7 (L)               10/21/2022                 AST                      13                  10/21/2022                 NA                       136                 01/19/2023                 K                        3.8                 01/19/2023                 CL                       99                  01/19/2023                 CREATININE               1.0                 01/19/2023                 BUN                      22                  01/19/2023                 CO2                      27                  01/19/2023                 TSH                      3.004               10/06/2022                 HGBA1C                   5.7 (H)             06/07/2023             LDLCALC                  108.4               10/06/2022        Microalb/Creat Ratio   1711.1                 06/07/2023      Current Outpatient Medications   Medication Sig    amLODIPine (NORVASC) 10 MG tablet Take 10 mg by mouth once daily.    apixaban (ELIQUIS) 5 mg Tab Take 1 tablet (5 mg total) by mouth 2 (two) times daily.    blood sugar diagnostic (BLOOD GLUCOSE TEST) Strp Use twice daily prn. Dx: E11.9    bumetanide (BUMEX) 1 MG tablet But also states some times takes 3 times per week    cetirizine (ZYRTEC) 10 MG tablet Take 10 mg by mouth once daily.     cloNIDine (CATAPRES) 0.1 MG tablet Take 0.1 mg by mouth 2 (two) times daily.    dorzolamide-timolol 2-0.5% (COSOPT) 22.3-6.8 mg/mL ophthalmic solution 1 drop.    EPINEPHrine (EPIPEN) 0.3 mg/0.3 mL AtIn INJECT 0.3MLS INTO THE MUSCLE ONCE FOR 1 DOSE    famotidine (PEPCID) 40 MG tablet Take 1 tablet (40 mg total) by  mouth once daily.    ferrous sulfate (FEROSUL) 325 mg (65 mg iron) Tab tablet Take 1 tablet (325 mg total) by mouth once daily.    FLOWFLEX COVID-19 AG HOME TEST Kit ADMINISTER AS PER PROTOCOL    hydrALAZINE (APRESOLINE) 100 MG tablet TAKE ONE TABLET BY MOUTH THREE TIMES DAILY    JANUVIA 100 mg Tab TAKE ONE TABLET BY MOUTH ONCE DAILY    lancets Misc Use twice daily prn. Dx: E11.9    levothyroxine (SYNTHROID) 50 MCG tablet TAKE ONE TABLET BY MOUTH ONCE DAILY before breakfast    LUMIGAN 0.01 % Drop Place into both eyes.    montelukast (SINGULAIR) 10 mg tablet TAKE ONE TABLET BY MOUTH IN THE EVENING    multivitamin with minerals tablet Take 1 tablet by mouth once daily.     nitroGLYCERIN (NITROSTAT) 0.4 MG SL tablet Place under the tongue.    blood-glucose meter kit Use as instructed - dx: E11.9       Review of Systems   Constitutional:  Negative for activity change, appetite change, chills, fatigue and fever.   HENT:  Positive for rhinorrhea. Negative for congestion, postnasal drip, sinus pressure, sinus pain, sneezing and sore throat.         See history of present illness.   Respiratory:  Positive for cough. Negative for shortness of breath and wheezing.         See history of present illness.   Cardiovascular:  Negative for chest pain, palpitations and leg swelling.        See history of present illness.   Gastrointestinal:  Positive for anal bleeding. Negative for abdominal pain, constipation, diarrhea, nausea and vomiting.        See history of present illness.   Endocrine: Negative for cold intolerance, heat intolerance, polydipsia, polyphagia and polyuria.        See history of present illness.   Genitourinary:  Negative for difficulty urinating.   Musculoskeletal:  Negative for back pain.   Neurological:  Negative for numbness and headaches.   Psychiatric/Behavioral:  Negative for dysphoric mood and suicidal ideas. The patient is not nervous/anxious.         Negative for homicidal ideas.      Objective:  Physical Exam  Vitals and nursing note reviewed.   Constitutional:       General: She is not in acute distress.     Appearance: Normal appearance. She is well-developed. She is not ill-appearing, toxic-appearing or diaphoretic.   HENT:      Head: Normocephalic and atraumatic.      Right Ear: Tympanic membrane, ear canal and external ear normal. There is no impacted cerumen.      Left Ear: Tympanic membrane, ear canal and external ear normal. There is no impacted cerumen.      Ears:      Comments: She wears hearing aids.     Nose: No congestion or rhinorrhea.      Mouth/Throat:      Mouth: Mucous membranes are moist.      Pharynx: Oropharynx is clear. No oropharyngeal exudate or posterior oropharyngeal erythema.   Eyes:      General:         Right eye: No discharge.         Left eye: No discharge.      Extraocular Movements: Extraocular movements intact.      Conjunctiva/sclera: Conjunctivae normal.      Pupils: Pupils are equal, round, and reactive to light.   Cardiovascular:      Rate and Rhythm: Normal rate and regular rhythm.      Heart sounds: Murmur heard.     No gallop.   Pulmonary:      Effort: Pulmonary effort is normal.      Breath sounds: Normal breath sounds.   Abdominal:      General: Bowel sounds are normal. There is no distension.      Palpations: Abdomen is soft. There is no mass.      Tenderness: There is no abdominal tenderness. There is no guarding or rebound.   Musculoskeletal:         General: No swelling or tenderness. Normal range of motion.      Cervical back: Normal range of motion and neck supple. No tenderness.      Comments: She is ambulatory with assistance of cane.    Lymphadenopathy:      Cervical: No cervical adenopathy.   Skin:     General: Skin is warm and dry.   Neurological:      Mental Status: She is alert.      Motor: No abnormal muscle tone.   Psychiatric:         Mood and Affect: Mood normal.         Speech: Speech normal.         Behavior: Behavior normal.          Thought Content: Thought content normal.         Judgment: Judgment normal.       ASSESSMENT:  1. Cough, unspecified type    2. Runny nose    3. Hypertension associated with diabetes    4. Nonrheumatic aortic valve stenosis    5. Obstructive hypertrophic cardiomyopathy    6. Type 2 diabetes mellitus without complication, without long-term current use of insulin        PLAN:  Edie was seen today for cough.    Diagnoses and all orders for this visit:    Cough, unspecified type  -     hydrocodone-chlorpheniramine (TUSSIONEX) 10-8 mg/5 mL suspension; Take 5 mLs by mouth every 12 (twelve) hours as needed for Cough.    Runny nose  -     hydrocodone-chlorpheniramine (TUSSIONEX) 10-8 mg/5 mL suspension; Take 5 mLs by mouth every 12 (twelve) hours as needed for Cough.    Hypertension associated with diabetes    Nonrheumatic aortic valve stenosis    Obstructive hypertrophic cardiomyopathy    Type 2 diabetes mellitus without complication, without long-term current use of insulin      Continue current medications, follow low sodium, low cholesterol, low carb diet, daily walks.  Tussionex 1 teaspoon every 12 hours prn cough, runny nose; medication precautions discussed with patient.  Monitor for rectal bleeding and follow up sooner if reoccurs.   Keep follow up with specialists.  Flu shot this fall.  Follow up if symptoms worsen or fail to improve.    30 minutes of total time spent on the encounter, which includes face to face time and non-face to face time preparing to see the patient (eg, review of tests), Obtaining and/or reviewing separately obtained history, Documenting clinical information in the electronic or other health record, Independently interpreting results (not separately reported) and communicating results to the patient/family/caregiver, or Care coordination (not separately reported).      This note is  generated with speech recognition software and is subject to transcription error and sound alike  phrases that may be missed by proofreading.

## 2023-08-02 ENCOUNTER — TELEPHONE (OUTPATIENT)
Dept: PRIMARY CARE CLINIC | Facility: CLINIC | Age: 88
End: 2023-08-02
Payer: MEDICARE

## 2023-08-02 NOTE — TELEPHONE ENCOUNTER
----- Message from Kristina Chapa sent at 8/2/2023  2:59 PM CDT -----  Contact: Sarah/Rodriguez Smith is calling in regard to check the status on the plan of care that was faxed in 7/29.  Please fax with dr. Tineo at [phone]  and if any questions, [phone] ext [phone]

## 2023-08-02 NOTE — TELEPHONE ENCOUNTER
Rodriguez Levy (Sarah) polo in wanting to know if you received paperwork that was faxed over? It just needs a signature.

## 2023-08-04 ENCOUNTER — HOSPITAL ENCOUNTER (OUTPATIENT)
Dept: RADIOLOGY | Facility: HOSPITAL | Age: 88
Discharge: HOME | End: 2023-08-04
Attending: INTERNAL MEDICINE
Payer: MEDICARE

## 2023-08-04 DIAGNOSIS — D64.9 NORMOCYTIC ANEMIA: ICD-10-CM

## 2023-08-04 DIAGNOSIS — Z86.718 HISTORY OF DVT (DEEP VEIN THROMBOSIS): ICD-10-CM

## 2023-08-04 DIAGNOSIS — R60.1 GENERALIZED EDEMA: ICD-10-CM

## 2023-08-04 PROCEDURE — 93970 EXTREMITY STUDY: CPT | Mod: 26,,, | Performed by: RADIOLOGY

## 2023-08-04 PROCEDURE — 93970 EXTREMITY STUDY: CPT | Mod: TC

## 2023-08-04 PROCEDURE — 93970 US LOWER EXTREMITY VEINS BILATERAL: ICD-10-PCS | Mod: 26,,, | Performed by: RADIOLOGY

## 2023-08-08 ENCOUNTER — OFFICE VISIT (OUTPATIENT)
Dept: HOME HEALTH SERVICES | Facility: CLINIC | Age: 88
End: 2023-08-08
Payer: MEDICARE

## 2023-08-08 VITALS
DIASTOLIC BLOOD PRESSURE: 60 MMHG | TEMPERATURE: 98 F | OXYGEN SATURATION: 96 % | BODY MASS INDEX: 26.47 KG/M2 | HEIGHT: 60 IN | SYSTOLIC BLOOD PRESSURE: 136 MMHG | HEART RATE: 70 BPM | WEIGHT: 134.81 LBS

## 2023-08-08 DIAGNOSIS — I42.1 OBSTRUCTIVE HYPERTROPHIC CARDIOMYOPATHY: ICD-10-CM

## 2023-08-08 DIAGNOSIS — I82.512 CHRONIC DEEP VEIN THROMBOSIS (DVT) OF FEMORAL VEIN OF LEFT LOWER EXTREMITY: ICD-10-CM

## 2023-08-08 DIAGNOSIS — E03.9 HYPOTHYROIDISM, UNSPECIFIED TYPE: ICD-10-CM

## 2023-08-08 DIAGNOSIS — H91.93 BILATERAL HEARING LOSS, UNSPECIFIED HEARING LOSS TYPE: ICD-10-CM

## 2023-08-08 DIAGNOSIS — E11.59 HYPERTENSION ASSOCIATED WITH DIABETES: ICD-10-CM

## 2023-08-08 DIAGNOSIS — R00.1 BRADYCARDIA: ICD-10-CM

## 2023-08-08 DIAGNOSIS — E66.3 OVERWEIGHT (BMI 25.0-29.9): ICD-10-CM

## 2023-08-08 DIAGNOSIS — H40.1133 PRIMARY OPEN ANGLE GLAUCOMA (POAG) OF BOTH EYES, SEVERE STAGE: ICD-10-CM

## 2023-08-08 DIAGNOSIS — D64.9 NORMOCYTIC ANEMIA: ICD-10-CM

## 2023-08-08 DIAGNOSIS — N18.32 STAGE 3B CHRONIC KIDNEY DISEASE: ICD-10-CM

## 2023-08-08 DIAGNOSIS — Z86.79 HISTORY OF SUBDURAL HEMATOMA: ICD-10-CM

## 2023-08-08 DIAGNOSIS — I15.2 HYPERTENSION ASSOCIATED WITH DIABETES: ICD-10-CM

## 2023-08-08 DIAGNOSIS — Z00.00 ENCOUNTER FOR PREVENTIVE HEALTH EXAMINATION: Primary | ICD-10-CM

## 2023-08-08 DIAGNOSIS — E11.9 TYPE 2 DIABETES MELLITUS WITHOUT COMPLICATION, WITHOUT LONG-TERM CURRENT USE OF INSULIN: ICD-10-CM

## 2023-08-08 DIAGNOSIS — I35.0 NONRHEUMATIC AORTIC VALVE STENOSIS: ICD-10-CM

## 2023-08-08 DIAGNOSIS — R01.1 CARDIAC MURMUR: ICD-10-CM

## 2023-08-08 PROBLEM — I82.509 CHRONIC DEEP VEIN THROMBOSIS (DVT): Status: ACTIVE | Noted: 2023-06-16

## 2023-08-08 PROCEDURE — 1126F AMNT PAIN NOTED NONE PRSNT: CPT | Mod: CPTII,S$GLB,, | Performed by: NURSE PRACTITIONER

## 2023-08-08 PROCEDURE — 1160F PR REVIEW ALL MEDS BY PRESCRIBER/CLIN PHARMACIST DOCUMENTED: ICD-10-PCS | Mod: CPTII,S$GLB,, | Performed by: NURSE PRACTITIONER

## 2023-08-08 PROCEDURE — G0439 PR MEDICARE ANNUAL WELLNESS SUBSEQUENT VISIT: ICD-10-PCS | Mod: S$GLB,,, | Performed by: NURSE PRACTITIONER

## 2023-08-08 PROCEDURE — 1159F MED LIST DOCD IN RCRD: CPT | Mod: CPTII,S$GLB,, | Performed by: NURSE PRACTITIONER

## 2023-08-08 PROCEDURE — 3288F PR FALLS RISK ASSESSMENT DOCUMENTED: ICD-10-PCS | Mod: CPTII,S$GLB,, | Performed by: NURSE PRACTITIONER

## 2023-08-08 PROCEDURE — G0439 PPPS, SUBSEQ VISIT: HCPCS | Mod: S$GLB,,, | Performed by: NURSE PRACTITIONER

## 2023-08-08 PROCEDURE — 1170F FXNL STATUS ASSESSED: CPT | Mod: CPTII,S$GLB,, | Performed by: NURSE PRACTITIONER

## 2023-08-08 PROCEDURE — 1126F PR PAIN SEVERITY QUANTIFIED, NO PAIN PRESENT: ICD-10-PCS | Mod: CPTII,S$GLB,, | Performed by: NURSE PRACTITIONER

## 2023-08-08 PROCEDURE — 1159F PR MEDICATION LIST DOCUMENTED IN MEDICAL RECORD: ICD-10-PCS | Mod: CPTII,S$GLB,, | Performed by: NURSE PRACTITIONER

## 2023-08-08 PROCEDURE — 1101F PT FALLS ASSESS-DOCD LE1/YR: CPT | Mod: CPTII,S$GLB,, | Performed by: NURSE PRACTITIONER

## 2023-08-08 PROCEDURE — 1101F PR PT FALLS ASSESS DOC 0-1 FALLS W/OUT INJ PAST YR: ICD-10-PCS | Mod: CPTII,S$GLB,, | Performed by: NURSE PRACTITIONER

## 2023-08-08 PROCEDURE — 3288F FALL RISK ASSESSMENT DOCD: CPT | Mod: CPTII,S$GLB,, | Performed by: NURSE PRACTITIONER

## 2023-08-08 PROCEDURE — 1160F RVW MEDS BY RX/DR IN RCRD: CPT | Mod: CPTII,S$GLB,, | Performed by: NURSE PRACTITIONER

## 2023-08-08 PROCEDURE — 1170F PR FUNCTIONAL STATUS ASSESSED: ICD-10-PCS | Mod: CPTII,S$GLB,, | Performed by: NURSE PRACTITIONER

## 2023-08-08 NOTE — Clinical Note
Medicare awv complete. Health maintenance:  Tetanus vaccine and shingles vaccines due-encouraged patient to obtain at a pharmacy.

## 2023-08-08 NOTE — PATIENT INSTRUCTIONS
Counseling and Referral of Other Preventative  (Italic type indicates deductible and co-insurance are waived)    Patient Name: Edie Hammond  Today's Date: 8/8/2023    Health Maintenance       Date Due Completion Date    TETANUS VACCINE Never done ---    Shingles Vaccine (2 of 3) 02/10/2016 12/16/2015    COVID-19 Vaccine (6 - Pfizer series) 01/16/2023 9/16/2022    Influenza Vaccine (1) 09/01/2023 9/16/2022    Lipid Panel 10/06/2023 10/6/2022    Hemoglobin A1c 12/07/2023 6/7/2023    Eye Exam 06/05/2024 6/5/2023    Diabetes Urine Screening 06/07/2024 6/7/2023    Foot Exam 06/07/2024 6/7/2023    Override on 2/15/2023: Done    Override on 7/30/2020: Done        No orders of the defined types were placed in this encounter.    The following information is provided to all patients.  This information is to help you find resources for any of the problems found today that may be affecting your health:                Living healthy guide: www.[facility].gov      Understanding Diabetes: www.diabetes.org      Eating healthy: www.cdc.gov/healthyweight      CDC home safety checklist: www.cdc.gov/steadi/patient.html      Agency on Aging: www.goea.[place].[facility]      Alcoholics anonymous (AA): www.aa.org      Physical Activity: www.daniel.nih.gov/vi8demo      Tobacco use: www.quitwithusla.org

## 2023-08-08 NOTE — PROGRESS NOTES
"Edie Hammond presented for Medicare AWV today. The following components were reviewed and updated:    Medical history  Family History  Social history  Allergies and Current Medications  Health Risk Assessment  Health Maintenance  Care Team    **See Completed Assessments for Annual Wellness visit with in the encounter summary    The following assessments were completed:  Depression Screening  Cognitive function Screening    Timed Get Up Test  Whisper Test    Vitals:    08/08/23 1312   BP: 136/60   Pulse: 70   Temp: 97.8 °F (36.6 °C)   TempSrc: Temporal   SpO2: 96%   Weight: 61.1 kg (134 lb 12.8 oz)   Height: 4' 11.5" (1.511 m)     Body mass index is 26.77 kg/m².   ]    Physical Exam  Vitals reviewed.   Constitutional:       Appearance: Normal appearance. She is obese.   HENT:      Head: Normocephalic and atraumatic.      Ears:      Comments: Wearing hearing aids  Cardiovascular:      Rate and Rhythm: Normal rate and regular rhythm.      Pulses: Normal pulses.      Heart sounds: Murmur heard.   Pulmonary:      Effort: Pulmonary effort is normal.      Breath sounds: Normal breath sounds.   Musculoskeletal:         General: Normal range of motion.      Cervical back: Normal range of motion and neck supple.      Comments: Slight kyphosis   Skin:     General: Skin is warm and dry.   Neurological:      Mental Status: She is alert and oriented to person, place, and time.      Gait: Gait abnormal (short steps).   Psychiatric:         Mood and Affect: Mood normal.         Behavior: Behavior normal.          Outpatient Medications Marked as Taking for the 8/8/23 encounter (Office Visit) with Meenu Vegas FNP   Medication Sig Dispense Refill    amLODIPine (NORVASC) 10 MG tablet Take 10 mg by mouth once daily.      apixaban (ELIQUIS) 5 mg Tab TAKE ONE TABLET BY MOUTH TWICE DAILY 60 tablet 4    blood sugar diagnostic (BLOOD GLUCOSE TEST) Strp Use twice daily prn. Dx: E11.9 300 each 3    bumetanide (BUMEX) 1 MG tablet Take " 1 mg by mouth once daily.      cetirizine (ZYRTEC) 10 MG tablet Take 10 mg by mouth once daily.       cloNIDine (CATAPRES) 0.1 MG tablet Take 0.1 mg by mouth 2 (two) times daily.      dorzolamide-timolol 2-0.5% (COSOPT) 22.3-6.8 mg/mL ophthalmic solution 1 drop.      famotidine (PEPCID) 40 MG tablet Take 1 tablet (40 mg total) by mouth once daily. 30 tablet 11    ferrous sulfate (FEROSUL) 325 mg (65 mg iron) Tab tablet Take 1 tablet (325 mg total) by mouth once daily. 90 tablet 3    hydrALAZINE (APRESOLINE) 100 MG tablet TAKE ONE TABLET BY MOUTH THREE TIMES DAILY 270 tablet 0    JANUVIA 100 mg Tab TAKE ONE TABLET BY MOUTH ONCE DAILY 90 tablet 1    lancets Misc Use twice daily prn. Dx: E11.9 300 each 3    levothyroxine (SYNTHROID) 50 MCG tablet TAKE ONE TABLET BY MOUTH ONCE DAILY before breakfast 90 tablet 3    LUMIGAN 0.01 % Drop Place into both eyes.      montelukast (SINGULAIR) 10 mg tablet TAKE ONE TABLET BY MOUTH IN THE EVENING 30 tablet 1    multivitamin with minerals tablet Take 1 tablet by mouth once daily.           Diagnoses and health risks identified today and associated recommendations/orders:  1. Encounter for preventive health examination  Medicare Replaced by [facility] complete. Health maintenance:  Tetanus vaccine and shingles vaccines due-encouraged patient to obtain at a pharmacy.    2. Type 2 diabetes mellitus without complication, without long-term current use of insulin   Latest Reference Range & Units 01/07/20 08:46 05/12/20 11:10 08/17/20 12:40 11/17/20 12:23 05/28/21 16:18 10/04/21 12:04 01/04/22 11:54 04/22/22 12:20 10/06/22 11:52 02/06/23 11:58 06/07/23 12:07   Hemoglobin A1C External 4.0 - 5.6 % 5.9 (H) 5.7 (H) 5.7 (H) 5.6 5.9 (H) 6.1 (H) 6.0 (H) 5.9 (H) 5.8 (H) 6.1 (H) 5.7 (H)   Estimated Avg Glucose 68 - 131 mg/dL 123 117 117 114 123 128 126 123 120 128 117   (H): Data is abnormally high  Controlled. Continue current management. See med list. Patient states she checks her bs bid and ranges 100s-130s.  Reviewed diabetic diet, diabetic foot care, preferred BS, and HgbA1C levels. Follow up with your PCP as instructed, podiatry and ophthalmology yearly.      3. Hypertension associated with diabetes  Chronic and stable on BP medication.  Continue current management.  See med list above. Recommend low sodium diet. Follow up with PCP.       4. Obstructive hypertrophic cardiomyopathy  Chronic and stable. Continue current management. See med list above. Follow up with cardiology.      5. Stage 3b chronic kidney disease   Latest Reference Range & Units 10/06/22 11:52 10/21/22 11:53 01/19/23 13:36   eGFR >60 mL/min/1.73 m^2 60.0 47 ! 52.9 !   !: Data is abnormal  Chronic and stable. Continue current management. Recommend avoid nsaids and other nephrotoxic medications. Follow up with PCP/nephrologist.      6. Chronic deep vein thrombosis (DVT) of the left lower extremity   Chronic and stable. US 8/4/23 Partially occlusive thrombus in the left femoral vein. On eliquis. F/u with pcp and hematology.     7. Nonrheumatic aortic valve stenosis;  Cardiac murmur  Chronic and stable. Continue current management. See med list above. Follow up with cardiology.      8. Bradycardia  Stable. No acute issues. F/u with cardiology.     9. Primary open angle glaucoma (POAG) of both eyes, severe stage  Chronic and stable. Continue current management. See med list above. Follow up with ophthalmology.      10. Normocytic anemia   Latest Reference Range & Units 08/17/20 12:40 10/04/21 12:04 04/10/22 01:05 06/28/22 11:55 10/06/22 11:52 10/14/22 11:43 10/21/22 11:53 11/04/22 11:55 11/25/22 11:00 12/29/22 12:18 01/19/23 15:40 01/23/23 11:30 06/16/23 10:30 08/04/23 09:32   Hemoglobin 12.0 - 16.0 g/dL 12.2 12.1 12.5 12.0 9.7 (L) 8.9 (L) 8.7 (L) 8.3 (L) 9.3 (L) 10.0 (L) 10.9 (L) 9.9 (L) 10.8 (L) 11.6 (L)   Hematocrit 37.0 - 48.5 % 39.2 38.1 37.5 38.0 31.0 (L) 28.9 (L) 26.0 (L) 25.7 (L) 28.6 (L) 29.9 (L) 33.5 (L) 32.2 (L) 32.6 (L) 34.0 (L)   (L): Data  is abnormally low  Chronic and stable. Continue current management. See med list above. Follow up with hematology.     11. Bilateral hearing loss, unspecified hearing loss type  Wears hearing aids. Chronic and stable.     12. Hypothyroidism, unspecified type  Lab Results   Component Value Date    TSH 3.004 10/06/2022    Chronic and stable. Continue current management. See med list above. Follow up with PCP.     13. Overweight (BMI 25.0-29.9)  Stable. Recommend continued exercise.             Provided Edie with a 5-10 year written screening schedule and personal prevention plan. Recommendations were developed using the USPSTF age appropriate recommendations. Education, counseling, and referrals were provided as needed.  After Visit Summary printed and given to patient which includes a list of additional screenings\tests needed.    Follow up in about 1 year (around 8/8/2024) for annual wellness visit.      SACHA Lewis    I offered to discuss advanced care planning, including how to pick a person who would make decisions for you if you were unable to make them for yourself, called a health care power of , and what kind of decisions you might make such as use of life sustaining treatments such as ventilators and tube feeding when faced with a life limiting illness recorded on a living will that they will need to know. (How you want to be cared for as you near the end of your natural life)     X  Patient has advanced directives written and agrees to provide copies to the institution.

## 2023-08-09 ENCOUNTER — PATIENT MESSAGE (OUTPATIENT)
Dept: INTERNAL MEDICINE | Facility: CLINIC | Age: 88
End: 2023-08-09
Payer: MEDICARE

## 2023-08-09 ENCOUNTER — TELEPHONE (OUTPATIENT)
Dept: FAMILY MEDICINE | Facility: CLINIC | Age: 88
End: 2023-08-09
Payer: MEDICARE

## 2023-08-09 DIAGNOSIS — T78.3XXA ANGIOEDEMA, INITIAL ENCOUNTER: ICD-10-CM

## 2023-08-09 NOTE — TELEPHONE ENCOUNTER
Spoke with call . He reports pt is currently receiving therapy. Spoke with the pt as well and she states the same. She has an appt today at 11a with [facility].  I faxed requested document as well today.

## 2023-08-11 ENCOUNTER — OFFICE VISIT (OUTPATIENT)
Dept: HEMATOLOGY/ONCOLOGY | Facility: CLINIC | Age: 88
End: 2023-08-11
Payer: MEDICARE

## 2023-08-11 VITALS
SYSTOLIC BLOOD PRESSURE: 145 MMHG | WEIGHT: 137.56 LBS | HEART RATE: 62 BPM | OXYGEN SATURATION: 97 % | DIASTOLIC BLOOD PRESSURE: 64 MMHG | BODY MASS INDEX: 27.73 KG/M2 | TEMPERATURE: 98 F | HEIGHT: 59 IN

## 2023-08-11 DIAGNOSIS — D64.9 NORMOCYTIC ANEMIA: ICD-10-CM

## 2023-08-11 DIAGNOSIS — I82.512 CHRONIC DEEP VEIN THROMBOSIS (DVT) OF FEMORAL VEIN OF LEFT LOWER EXTREMITY: Primary | ICD-10-CM

## 2023-08-11 PROCEDURE — 1159F PR MEDICATION LIST DOCUMENTED IN MEDICAL RECORD: ICD-10-PCS | Mod: CPTII,S$GLB,, | Performed by: INTERNAL MEDICINE

## 2023-08-11 PROCEDURE — 1160F PR REVIEW ALL MEDS BY PRESCRIBER/CLIN PHARMACIST DOCUMENTED: ICD-10-PCS | Mod: CPTII,S$GLB,, | Performed by: INTERNAL MEDICINE

## 2023-08-11 PROCEDURE — 1101F PR PT FALLS ASSESS DOC 0-1 FALLS W/OUT INJ PAST YR: ICD-10-PCS | Mod: CPTII,S$GLB,, | Performed by: INTERNAL MEDICINE

## 2023-08-11 PROCEDURE — 1101F PT FALLS ASSESS-DOCD LE1/YR: CPT | Mod: CPTII,S$GLB,, | Performed by: INTERNAL MEDICINE

## 2023-08-11 PROCEDURE — 1160F RVW MEDS BY RX/DR IN RCRD: CPT | Mod: CPTII,S$GLB,, | Performed by: INTERNAL MEDICINE

## 2023-08-11 PROCEDURE — 1159F MED LIST DOCD IN RCRD: CPT | Mod: CPTII,S$GLB,, | Performed by: INTERNAL MEDICINE

## 2023-08-11 PROCEDURE — 3288F FALL RISK ASSESSMENT DOCD: CPT | Mod: CPTII,S$GLB,, | Performed by: INTERNAL MEDICINE

## 2023-08-11 PROCEDURE — 1126F PR PAIN SEVERITY QUANTIFIED, NO PAIN PRESENT: ICD-10-PCS | Mod: CPTII,S$GLB,, | Performed by: INTERNAL MEDICINE

## 2023-08-11 PROCEDURE — 99213 OFFICE O/P EST LOW 20 MIN: CPT | Mod: S$GLB,,, | Performed by: INTERNAL MEDICINE

## 2023-08-11 PROCEDURE — 99999 PR PBB SHADOW E&M-EST. PATIENT-LVL IV: ICD-10-PCS | Mod: PBBFAC,,, | Performed by: INTERNAL MEDICINE

## 2023-08-11 PROCEDURE — 99999 PR PBB SHADOW E&M-EST. PATIENT-LVL IV: CPT | Mod: PBBFAC,,, | Performed by: INTERNAL MEDICINE

## 2023-08-11 PROCEDURE — 99213 PR OFFICE/OUTPT VISIT, EST, LEVL III, 20-29 MIN: ICD-10-PCS | Mod: S$GLB,,, | Performed by: INTERNAL MEDICINE

## 2023-08-11 PROCEDURE — 3288F PR FALLS RISK ASSESSMENT DOCUMENTED: ICD-10-PCS | Mod: CPTII,S$GLB,, | Performed by: INTERNAL MEDICINE

## 2023-08-11 PROCEDURE — 1126F AMNT PAIN NOTED NONE PRSNT: CPT | Mod: CPTII,S$GLB,, | Performed by: INTERNAL MEDICINE

## 2023-08-11 RX ORDER — MONTELUKAST SODIUM 10 MG/1
TABLET ORAL
Qty: 30 TABLET | Refills: 0 | Status: SHIPPED | OUTPATIENT
Start: 2023-08-11 | End: 2023-09-14

## 2023-08-11 NOTE — PROGRESS NOTES
"Subjective     Patient ID: Edie Hammond is a 89 y.o. female.    Chief Complaint: No chief complaint on file.    HPI    This is a 89 year old woman with HTN, DM, Hypothyroidism, Hx DVT and anemia.     Reviewed repeat U/S and hypercoagulability labs  She still has a left partially occlusive thrombus in the mid to distal femoral vein.      Our team originally met her in December of 2022 (Dr. Rai) for an evaluation of anemia  Per this assessment:  -continue oral iron supplementation indefinitely  --elevated ferritin is reactive with her known ESR/CRP elevation  -repeat labs CBC and follow up in 4 months  -discussed with her that if her anemia worsens (ie. Hg trend below 8) or if she develops new cytopenias, would need to consider bone marrow biopsy     Following this visit she had a new heme consultation issue: LLE DVT and was placed on Eliquis  Work-up  She had a negative mammogram in 2021 (had not required continued screening with age)  I do not see a hypercoagulable work up in labs      - 1/19/2023 Bilateral Extremity Ultrasound:  RIGHT: There is normal compressibility of the common femoral, femoral, popliteal, and portions of the calf veins.  There is normal spontaneous and phasic variation throughout the leg by spectral Doppler.  LEFT: Left common femoral vein is patent compressible.  Partial thrombosis seen within the left femoral vein which appears non-expanded and likely subacute to chronic or chronic with some superimposed acute component.  Collaterals are seen. Popliteal vein appears patent with some wall adherent clot.  Posterior tibial, anterior tibial and peroneal veins are all patent.  Impression:  Partial thrombosis seen within the left femoral vein which appears non-expanded and likely subacute to chronic or chronic with some superimposed acute component.     Of note:  - 4/10/2022 CTA Chest:  No CTA evidence of pulmonary thromboembolism.        Today she reports her energy level is "quite " "good"  Sometimes notes awakening early   Able to manage all ADLs- states lives in a intermediate community and plays bridge there - plans to use an exercise room     PMH:  Listed but please note prior subdural hematoma post a fall required surgery in 2017    Review of Systems       Objective     Physical Exam       Assessment and Plan     1. Chronic deep vein thrombosis (DVT) of femoral vein of left lower extremity  -     US LLE VENOUS; Future; Expected date: 08/11/2023    2. Normocytic anemia        Continued chronic DVT  Continue Eliquis  Negative hypercoagulable work up  RTC 3 months      Anemia- corrected and iron stores normal    Route Chart for Scheduling    Med Onc Chart Routing      Follow up with physician 3 months. ultrasound prior   Follow up with [facility]    Infusion scheduling note    Injection scheduling note    Labs    Imaging    Pharmacy appointment    Other referrals                      "

## 2023-08-25 ENCOUNTER — TELEPHONE (OUTPATIENT)
Dept: FAMILY MEDICINE | Facility: CLINIC | Age: 88
End: 2023-08-25
Payer: MEDICARE

## 2023-09-08 ENCOUNTER — TELEPHONE (OUTPATIENT)
Dept: FAMILY MEDICINE | Facility: CLINIC | Age: 88
End: 2023-09-08
Payer: MEDICARE

## 2023-09-08 NOTE — TELEPHONE ENCOUNTER
----- Message from Yovany West sent at 9/8/2023 11:20 AM CDT -----  Contact: [phone]  Patient is calling in regards to her nystatin cream. She stated that pharmacy is needing a prior authorization. Please call pt back at [phone].             [facility] - [place] [id number] [place] [id number] [address]  Phone: [phone] Fax: [phone]      Thanks KB

## 2023-09-09 DIAGNOSIS — E11.9 TYPE 2 DIABETES MELLITUS WITHOUT COMPLICATION, WITHOUT LONG-TERM CURRENT USE OF INSULIN: ICD-10-CM

## 2023-09-09 NOTE — TELEPHONE ENCOUNTER
Care Due:                  Date            Visit Type   Department     Provider  --------------------------------------------------------------------------------                                EP -                              PRIMARY      [facility]  Last Visit: 07-      CARE ([facility])   BROOKE Gary                              EP -                              PRIMARY      [facility]  Next Visit: 10-      CARE ([facility])   MEDICINE       Erin Gary                                                            Last  Test          Frequency    Reason                     Performed    Due Date  --------------------------------------------------------------------------------    HBA1C.......  6 months...  JANUVIA..................  06- 12-    [facility] Embedded Care Due Messages. Reference number: [id number].   9/09/2023 9:18:19 AM CDT

## 2023-09-11 RX ORDER — SITAGLIPTIN 100 MG/1
TABLET, FILM COATED ORAL
Qty: 90 TABLET | Refills: 0 | Status: SHIPPED | OUTPATIENT
Start: 2023-09-11 | End: 2023-12-27

## 2023-09-11 NOTE — TELEPHONE ENCOUNTER
Provider Staff:  Action required for this patient     Please see care gap opportunities below in Care Due Message.    Thanks!  [facility]     Appointments      Date Provider   Last Visit   7/25/2023 Erin Gary MD   Next Visit   10/9/2023 Erin Gary MD      Refill Decision Note   Edie Hammond  is requesting a refill authorization.  Brief Assessment and Rationale for Refill:  Approve     Medication Therapy Plan:         Comments:     Note composed:3:12 AM 09/11/2023

## 2023-09-13 DIAGNOSIS — T78.3XXA ANGIOEDEMA, INITIAL ENCOUNTER: ICD-10-CM

## 2023-09-14 RX ORDER — MONTELUKAST SODIUM 10 MG/1
TABLET ORAL
Qty: 30 TABLET | Refills: 0 | Status: SHIPPED | OUTPATIENT
Start: 2023-09-14 | End: 2023-10-18

## 2023-10-09 ENCOUNTER — LAB VISIT (OUTPATIENT)
Dept: LAB | Facility: HOSPITAL | Age: 88
End: 2023-10-09
Attending: FAMILY MEDICINE
Payer: MEDICARE

## 2023-10-09 ENCOUNTER — OFFICE VISIT (OUTPATIENT)
Dept: FAMILY MEDICINE | Facility: CLINIC | Age: 88
End: 2023-10-09
Payer: MEDICARE

## 2023-10-09 VITALS
BODY MASS INDEX: 27.33 KG/M2 | HEART RATE: 68 BPM | RESPIRATION RATE: 18 BRPM | OXYGEN SATURATION: 95 % | SYSTOLIC BLOOD PRESSURE: 132 MMHG | DIASTOLIC BLOOD PRESSURE: 62 MMHG | WEIGHT: 135.56 LBS | TEMPERATURE: 97 F | HEIGHT: 59 IN

## 2023-10-09 DIAGNOSIS — R01.1 CARDIAC MURMUR: ICD-10-CM

## 2023-10-09 DIAGNOSIS — I15.2 HYPERTENSION ASSOCIATED WITH DIABETES: Primary | ICD-10-CM

## 2023-10-09 DIAGNOSIS — N18.32 STAGE 3B CHRONIC KIDNEY DISEASE: ICD-10-CM

## 2023-10-09 DIAGNOSIS — E03.9 HYPOTHYROIDISM, UNSPECIFIED TYPE: ICD-10-CM

## 2023-10-09 DIAGNOSIS — E11.9 TYPE 2 DIABETES MELLITUS WITHOUT COMPLICATION, WITHOUT LONG-TERM CURRENT USE OF INSULIN: ICD-10-CM

## 2023-10-09 DIAGNOSIS — I82.512 CHRONIC DEEP VEIN THROMBOSIS (DVT) OF FEMORAL VEIN OF LEFT LOWER EXTREMITY: ICD-10-CM

## 2023-10-09 DIAGNOSIS — Z78.0 POSTMENOPAUSAL: ICD-10-CM

## 2023-10-09 DIAGNOSIS — B37.31 VAGINAL CANDIDIASIS: ICD-10-CM

## 2023-10-09 DIAGNOSIS — I35.0 NONRHEUMATIC AORTIC VALVE STENOSIS: ICD-10-CM

## 2023-10-09 DIAGNOSIS — I15.2 HYPERTENSION ASSOCIATED WITH DIABETES: ICD-10-CM

## 2023-10-09 DIAGNOSIS — I42.1 OBSTRUCTIVE HYPERTROPHIC CARDIOMYOPATHY: ICD-10-CM

## 2023-10-09 DIAGNOSIS — H40.1133 PRIMARY OPEN ANGLE GLAUCOMA (POAG) OF BOTH EYES, SEVERE STAGE: ICD-10-CM

## 2023-10-09 DIAGNOSIS — E11.59 HYPERTENSION ASSOCIATED WITH DIABETES: ICD-10-CM

## 2023-10-09 DIAGNOSIS — E66.3 OVERWEIGHT (BMI 25.0-29.9): ICD-10-CM

## 2023-10-09 DIAGNOSIS — H91.93 BILATERAL HEARING LOSS, UNSPECIFIED HEARING LOSS TYPE: ICD-10-CM

## 2023-10-09 DIAGNOSIS — E11.59 HYPERTENSION ASSOCIATED WITH DIABETES: Primary | ICD-10-CM

## 2023-10-09 PROCEDURE — 1101F PT FALLS ASSESS-DOCD LE1/YR: CPT | Mod: CPTII,S$GLB,, | Performed by: FAMILY MEDICINE

## 2023-10-09 PROCEDURE — 80061 LIPID PANEL: CPT | Performed by: FAMILY MEDICINE

## 2023-10-09 PROCEDURE — 3288F PR FALLS RISK ASSESSMENT DOCUMENTED: ICD-10-PCS | Mod: CPTII,S$GLB,, | Performed by: FAMILY MEDICINE

## 2023-10-09 PROCEDURE — 1160F RVW MEDS BY RX/DR IN RCRD: CPT | Mod: CPTII,S$GLB,, | Performed by: FAMILY MEDICINE

## 2023-10-09 PROCEDURE — 1160F PR REVIEW ALL MEDS BY PRESCRIBER/CLIN PHARMACIST DOCUMENTED: ICD-10-PCS | Mod: CPTII,S$GLB,, | Performed by: FAMILY MEDICINE

## 2023-10-09 PROCEDURE — 99999 PR PBB SHADOW E&M-EST. PATIENT-LVL V: ICD-10-PCS | Mod: PBBFAC,,, | Performed by: FAMILY MEDICINE

## 2023-10-09 PROCEDURE — 99215 OFFICE O/P EST HI 40 MIN: CPT | Mod: S$GLB,,, | Performed by: FAMILY MEDICINE

## 2023-10-09 PROCEDURE — 1159F PR MEDICATION LIST DOCUMENTED IN MEDICAL RECORD: ICD-10-PCS | Mod: CPTII,S$GLB,, | Performed by: FAMILY MEDICINE

## 2023-10-09 PROCEDURE — 1125F PR PAIN SEVERITY QUANTIFIED, PAIN PRESENT: ICD-10-PCS | Mod: CPTII,S$GLB,, | Performed by: FAMILY MEDICINE

## 2023-10-09 PROCEDURE — 1125F AMNT PAIN NOTED PAIN PRSNT: CPT | Mod: CPTII,S$GLB,, | Performed by: FAMILY MEDICINE

## 2023-10-09 PROCEDURE — 84443 ASSAY THYROID STIM HORMONE: CPT | Performed by: FAMILY MEDICINE

## 2023-10-09 PROCEDURE — 36415 COLL VENOUS BLD VENIPUNCTURE: CPT | Mod: PO | Performed by: FAMILY MEDICINE

## 2023-10-09 PROCEDURE — 3288F FALL RISK ASSESSMENT DOCD: CPT | Mod: CPTII,S$GLB,, | Performed by: FAMILY MEDICINE

## 2023-10-09 PROCEDURE — 80053 COMPREHEN METABOLIC PANEL: CPT | Performed by: FAMILY MEDICINE

## 2023-10-09 PROCEDURE — 1159F MED LIST DOCD IN RCRD: CPT | Mod: CPTII,S$GLB,, | Performed by: FAMILY MEDICINE

## 2023-10-09 PROCEDURE — 99215 PR OFFICE/OUTPT VISIT, EST, LEVL V, 40-54 MIN: ICD-10-PCS | Mod: S$GLB,,, | Performed by: FAMILY MEDICINE

## 2023-10-09 PROCEDURE — 83036 HEMOGLOBIN GLYCOSYLATED A1C: CPT | Performed by: FAMILY MEDICINE

## 2023-10-09 PROCEDURE — 1101F PR PT FALLS ASSESS DOC 0-1 FALLS W/OUT INJ PAST YR: ICD-10-PCS | Mod: CPTII,S$GLB,, | Performed by: FAMILY MEDICINE

## 2023-10-09 PROCEDURE — 99999 PR PBB SHADOW E&M-EST. PATIENT-LVL V: CPT | Mod: PBBFAC,,, | Performed by: FAMILY MEDICINE

## 2023-10-09 RX ORDER — LEVOTHYROXINE SODIUM 50 UG/1
50 TABLET ORAL
Qty: 90 TABLET | Refills: 3 | Status: SHIPPED | OUTPATIENT
Start: 2023-10-09

## 2023-10-09 RX ORDER — NYSTATIN 100000 U/G
CREAM TOPICAL 2 TIMES DAILY
Qty: 30 G | Refills: 0 | Status: SHIPPED | OUTPATIENT
Start: 2023-10-09

## 2023-10-09 NOTE — PROGRESS NOTES
HISTORY OF PRESENT ILLNESS: Ms. Hammond comes in today not fasting and with taking medication today.     END OF LIFE DECISION: She does have a living will. She does not desire life support.    Current Outpatient Medications   Medication Sig    amLODIPine (NORVASC) 10 MG tablet Take 10 mg by mouth once daily.    apixaban (ELIQUIS) 5 mg Tab TAKE ONE TABLET BY MOUTH TWICE DAILY    blood sugar diagnostic (BLOOD GLUCOSE TEST) Strp Use twice daily prn. Dx: E11.9    blood-glucose meter kit Use as instructed - dx: E11.9    bumetanide (BUMEX) 1 MG tablet Take 1 mg by mouth once daily.    cetirizine (ZYRTEC) 10 MG tablet Take 10 mg by mouth once daily.     cloNIDine (CATAPRES) 0.1 MG tablet Take 0.1 mg by mouth 2 (two) times daily.    dorzolamide-timolol 2-0.5% (COSOPT) 22.3-6.8 mg/mL ophthalmic solution 1 drop.    EPINEPHrine (EPIPEN) 0.3 mg/0.3 mL AtIn INJECT 0.3MLS INTO THE MUSCLE ONCE FOR 1 DOSE    famotidine (PEPCID) 40 MG tablet Take 1 tablet (40 mg total) by mouth once daily.    ferrous sulfate (FEROSUL) 325 mg (65 mg iron) Tab tablet Take 1 tablet (325 mg total) by mouth once daily.    hydrALAZINE (APRESOLINE) 100 MG tablet TAKE ONE TABLET BY MOUTH THREE TIMES DAILY    JANUVIA 100 mg Tab TAKE ONE TABLET BY MOUTH ONCE DAILY    lancets Misc Use twice daily prn. Dx: E11.9    levothyroxine (SYNTHROID) 50 MCG tablet TAKE ONE TABLET BY MOUTH ONCE DAILY before breakfast    LUMIGAN 0.01 % Drop Place into both eyes.    montelukast (SINGULAIR) 10 mg tablet TAKE ONE TABLET BY MOUTH IN THE EVENING    multivitamin with minerals tablet Take 1 tablet by mouth once daily.     nitroGLYCERIN (NITROSTAT) 0.4 MG SL tablet Place under the tongue.      SCREENINGS:    Cholesterol: October 6, 2022.  FFS/Colonoscopy: In the past per patient. No longer needed due to her age.  Dexa Scan: Never.  Mammogram: October 19, 2021 - okay.  She declines.         GYN Exam:  In the past per patient.  Eye Exam: Follows with Dr. Sidney Reeves for glaucoma  surveillance with last visit on June 5, 2023 and scheduled for today.   Dental Exam: September 2023 per patient.   PPD: Negative in the past per patient.     Immunizations:   Tdap - > 10 years ago per patient. Advised patient insurance-covered benefit only at local pharmacy.  Zostavax - December 16, 2015.  Shingrix - Never. Advised patient insurance-covered benefit only at local pharmacy.     Prevnar -13-  August 10, 2017.  Pneumovax -  September 24, 2018  Seasonal Flu - September 22, 2023.  COVID-19 (Pfizer) vaccine series - January 5, 2021, January 26, 2021, November 9, 2021, April 15, 2022. September 16, 2022, September 22, 2023.                       ROS:  GENERAL: Denies fever, chills, fatigue or unusual weight change. Weight 62.6 kg (138 lb 0.1 oz) at July 27, 2023 visit. Appetite normal. Exercises does - 2 times per week, 50 minutes each time with physical therapy for balance and strengthening. Monitors diet does.  SKIN: Denies rashes, itching, changes in mole, color or texture of skin or easy bruising. Reports occasional itching with small bumps (not now) and concerned about CKD; requests nystatin cream for occasional external vaginal itching.  HEAD:  Denies headaches or recent head trauma.   EYES: Denies change in vision, pain, diplopia, redness or discharge.  EARS: Denies ear pain, discharge or decreased hearing. Reports occasional vertigo (only with lying down) and takes Meclizine 12.5 mg 1/2 to 1 pill twice daily prn vertigo prescribed by Dr. Darlene Null, ENT, in 2016.  NOSE: Denies loss of smell, epistaxis or rhinitis. Saw Dr. Jacqueline Darnell, Allergist, on February 14, 2023 for angioedema, allergies.  MOUTH & THROAT: Denies hoarseness or change in voice. Denies excessive gum bleeding or mouth sores.  Denies sore throat.  Reports voice change over the weekend but now better.  NODES: Denies swollen glands.  CHEST: Denies wheezing, cough, or sputum production.   CARDIOVASCULAR: Denies chest pain, PND,  "orthopnea or reduced exercise tolerance.  Denies palpitations. Reports saw Dr. Faisal Milton, cardiologist, on July 3, 2023 for chest pain, unspecified, personal history of other venous thrombosis and embolism, non-rheumatic aortic (valve) stenosis, hypertensive heart disease without heart failure, obstructive hypertrophic cardiomyopathy, cardiac murmur, unspecified, nontraumatic chronic subdural hemorrhage. Occasionally performs home blood pressure checks with levels ranging 120-130's/60's.   ABDOMEN: Denies diarrhea, constipation, nausea, vomiting, abdominal pain, or blood in stool.  URINARY: Denies flank pain, dysuria or hematuria.  Reports occasional nocturia for years but declines urine culture as not feels has UTI.  GENITOURINARY: Denies flank pain, dysuria, frequency or hematuria. No change in menses. Performs monthly breast exams some times.  ENDOCRINE: Denies, diabetes, thyroid, cholesterol problems. Performs home glucose checks daily (before breakfast) with levels ranging 100's and 114 this morning.   HEME/LYMPH: Denies bleeding problems. Reports saw Dr. Gonzales, hematologist/oncologist on August 11, 2023 for surveillance chronic DVT of femoral vein of left lower extremity on Eliquis.  PERIPHERAL VASCULAR:Denies claudication or cyanosis.  MUSCULOSKELETAL: Denies joint stiffness, pain or swelling complains of having mild pain with swelling her left ankle and pain in her left knee 2 days ago without associated trauma and took Tylenol and applied balm with help. Denies edema.  NEUROLOGIC: Denies history of seizures, tremors, paralysis, alteration of gait or coordination.  PSYCHIATRIC: Denies mood swings, depression anxiety, homicidal or suicidal thoughts. Denies sleep problems.       PE:   VS: Blood Pressure 132/62   Pulse 68   Temperature 97.2 °F (36.2 °C) (Tympanic)   Respiration 18   Height 4' 11" (1.499 m)   Weight 61.5 kg (135 lb 9.3 oz)   Oxygen Saturation 95%   Body Mass Index 27.38 kg/m²  "   APPEARANCE:  Well nourished, well developed female, pleasant, elderly, and overweight, alert and oriented in no acute distress.    HEAD: Nontender. Full range of motion.  EYES: PERRL, conjunctiva pink, lids no edema.   EARS: External canal patent, no swelling or redness. TM's shiny and clear. She wears hearing aids.  NOSE: Mucosa and turbinates pink, not swollen. No discharge. Nontender sinuses.  THROAT: No pharyngeal erythema or exudate. No stridor.   NECK: Supple, no mass, thyroid not enlarged.  NODES: No cervical lymph node enlargement.  CHEST: Normal respiratory effort. Lungs clear to auscultation.  CARDIOVASCULAR: Normal S1, S2. No rubs or gallops. Chronic murmur noted. PMI not displaced. No carotid bruit. Pedal pulses palpable bilaterally. No edema.  ABDOMEN: Bowel sounds present. Not distended. Soft. No tenderness, masses or organomegaly. Non tender ventral hernia.  BREAST EXAM: Not examined per patient request.  PELVIC EXAM: Not examined per patient request.  RECTAL EXAM: Not examined per patient request.  MUSCULOSKELETAL: No joint deformities or stiffness. She is ambulatory without problems and has cane for assistance. Slightly tender at left outer ankle and non tender left knee with full range of motion noted.  SKIN: No rashes or suspicious lesions, normal color and turgor. Chronic benign-appearing seborrheic keratosis at mid-upper back noted.  NEUROLOGIC:   Cranial Nerves: II-XII grossly intact.   DTR's: Knees, Ankles 2+ and equal bilaterally. Gait & Posture: Normal gait and fine motion.  PSYCHIATRIC:Patient alert, oriented x 3. Mood/Affect normal without acute anxiety or depression noted. Judgment/insight good as she makes appropriate decisions during today's exam.    Protective Sensation (w/ 10 gram monofilament):  Right: Intact  Left: Intact    Visual Inspection:  Normal -  Bilateral    Pedal Pulses:   Right: Present  Left: Present    Posterior tibialis:   Right:Present  Left: Present      ASSESSMENT:    ICD-10-CM ICD-9-CM    1. Hypertension associated with diabetes  E11.59 250.80 TSH    I15.2 401.9 Lipid Panel      Comprehensive Metabolic Panel      2. Type 2 diabetes mellitus without complication, without long-term current use of insulin  E11.9 250.00 Hemoglobin A1C      3. Stage 3b chronic kidney disease  N18.32 585.3       4. Nonrheumatic aortic valve stenosis  I35.0 424.1       5. Cardiac murmur  R01.1 785.2       6. Obstructive hypertrophic cardiomyopathy  I42.1 425.11       7. Hypothyroidism, unspecified type  E03.9 244.9 TSH      levothyroxine (SYNTHROID) 50 MCG tablet      8. Chronic deep vein thrombosis (DVT) of femoral vein of left lower extremity  I82.512 453.51       9. Primary open angle glaucoma (POAG) of both eyes, severe stage  H40.1133 365.11      365.73       10. Bilateral hearing loss, unspecified hearing loss type  H91.93 389.9       11. Vaginal candidiasis  B37.31 112.1 nystatin (MYCOSTATIN) cream      12. Overweight (BMI 25.0-29.9)  E66.3 278.02       13. Postmenopausal  Z78.0 V49.81             PLAN:  1. Age-appropriate counseling-appropriate low-sodium, low-cholesterol, low carbohydrate diet and exercise daily, monthly breast self exam, annual wellness examination.   2. Patient advised to call for results.  3. Continue current medications.  4. Keep follow up with specialists.  5. Annual eye and dental examinations advised.  6. Reassurance regarding above concerns as likely age-related; also, discussed CKD with patient.  7. Prescription refills as noted above.  8. Follow up in about 5 months (around 3/9/2024) for hypertension and diabetes follow up.    40 minutes of total time spent on the encounter, which includes face to face time and non-face to face time preparing to see the patient (eg, review of tests), Obtaining and/or reviewing separately obtained history, Documenting clinical information in the electronic or other health record, Independently interpreting results  (not separately reported) and communicating results to the patient/family/caregiver, or Care coordination (not separately reported).

## 2023-10-10 LAB
ALBUMIN SERPL BCP-MCNC: 3.2 G/DL (ref 3.5–5.2)
ALP SERPL-CCNC: 77 U/L (ref 55–135)
ALT SERPL W/O P-5'-P-CCNC: 9 U/L (ref 10–44)
ANION GAP SERPL CALC-SCNC: 12 MMOL/L (ref 8–16)
AST SERPL-CCNC: 23 U/L (ref 10–40)
BILIRUB SERPL-MCNC: 0.2 MG/DL (ref 0.1–1)
BUN SERPL-MCNC: 21 MG/DL (ref 10–30)
CALCIUM SERPL-MCNC: 9.6 MG/DL (ref 8.7–10.5)
CHLORIDE SERPL-SCNC: 98 MMOL/L (ref 95–110)
CHOLEST SERPL-MCNC: 193 MG/DL (ref 120–199)
CHOLEST/HDLC SERPL: 3.4 {RATIO} (ref 2–5)
CO2 SERPL-SCNC: 26 MMOL/L (ref 23–29)
CREAT SERPL-MCNC: 1 MG/DL (ref 0.5–1.4)
EST. GFR  (NO RACE VARIABLE): 52.5 ML/MIN/1.73 M^2
ESTIMATED AVG GLUCOSE: 128 MG/DL (ref 68–131)
GLUCOSE SERPL-MCNC: 85 MG/DL (ref 70–110)
HBA1C MFR BLD: 6.1 % (ref 4–5.6)
HDLC SERPL-MCNC: 57 MG/DL (ref 40–75)
HDLC SERPL: 29.5 % (ref 20–50)
LDLC SERPL CALC-MCNC: 109.4 MG/DL (ref 63–159)
NONHDLC SERPL-MCNC: 136 MG/DL
POTASSIUM SERPL-SCNC: 3.4 MMOL/L (ref 3.5–5.1)
PROT SERPL-MCNC: 7.4 G/DL (ref 6–8.4)
SODIUM SERPL-SCNC: 136 MMOL/L (ref 136–145)
TRIGL SERPL-MCNC: 133 MG/DL (ref 30–150)
TSH SERPL DL<=0.005 MIU/L-ACNC: 3.43 UIU/ML (ref 0.4–4)

## 2023-10-17 DIAGNOSIS — T78.3XXA ANGIOEDEMA, INITIAL ENCOUNTER: ICD-10-CM

## 2023-10-18 RX ORDER — MONTELUKAST SODIUM 10 MG/1
TABLET ORAL
Qty: 30 TABLET | Refills: 0 | Status: SHIPPED | OUTPATIENT
Start: 2023-10-18 | End: 2023-11-20

## 2023-10-27 ENCOUNTER — TELEPHONE (OUTPATIENT)
Dept: FAMILY MEDICINE | Facility: CLINIC | Age: 88
End: 2023-10-27
Payer: MEDICARE

## 2023-10-27 NOTE — TELEPHONE ENCOUNTER
Spoke with lj sanabria.  To refax the plan of care and authorization for re certification for physical therapy; awaiting on arrival.

## 2023-10-27 NOTE — TELEPHONE ENCOUNTER
----- Message from Verona Smith sent at 10/27/2023  9:57 AM CDT -----  Contact: Sarah/Rodriguez Smith is calling to see if the office received the fax request for the plan of care and authorization for re certification for physical therapy that was fax on 10/23/23, Please call him at [phone] opt [phone].    Thanks  Td

## 2023-10-30 ENCOUNTER — TELEPHONE (OUTPATIENT)
Dept: FAMILY MEDICINE | Facility: CLINIC | Age: 88
End: 2023-10-30
Payer: MEDICARE

## 2023-10-30 NOTE — TELEPHONE ENCOUNTER
----- Message from Mitzi Cheung sent at 10/30/2023  3:10 PM CDT -----  Name of Who is Calling:Patient           What is the request in detail:Patient is requesting a call to get a sooner appt than next available for an ear ache           Can the clinic reply by [facility]:  no         What Number to Call Back if not in [facility]: [phone]

## 2023-10-31 ENCOUNTER — OFFICE VISIT (OUTPATIENT)
Dept: FAMILY MEDICINE | Facility: CLINIC | Age: 88
End: 2023-10-31
Payer: MEDICARE

## 2023-10-31 VITALS
TEMPERATURE: 98 F | HEART RATE: 87 BPM | DIASTOLIC BLOOD PRESSURE: 70 MMHG | RESPIRATION RATE: 18 BRPM | WEIGHT: 138 LBS | OXYGEN SATURATION: 92 % | BODY MASS INDEX: 27.87 KG/M2 | SYSTOLIC BLOOD PRESSURE: 120 MMHG

## 2023-10-31 DIAGNOSIS — H93.8X2 EAR FULLNESS, LEFT: ICD-10-CM

## 2023-10-31 DIAGNOSIS — E11.59 HYPERTENSION ASSOCIATED WITH DIABETES: ICD-10-CM

## 2023-10-31 DIAGNOSIS — H91.93 BILATERAL HEARING LOSS, UNSPECIFIED HEARING LOSS TYPE: ICD-10-CM

## 2023-10-31 DIAGNOSIS — R01.1 CARDIAC MURMUR: ICD-10-CM

## 2023-10-31 DIAGNOSIS — H61.892 SWELLING OF EXTERNAL EAR, LEFT: Primary | ICD-10-CM

## 2023-10-31 DIAGNOSIS — R09.81 SINUS CONGESTION: ICD-10-CM

## 2023-10-31 DIAGNOSIS — I15.2 HYPERTENSION ASSOCIATED WITH DIABETES: ICD-10-CM

## 2023-10-31 PROCEDURE — 99214 PR OFFICE/OUTPT VISIT, EST, LEVL IV, 30-39 MIN: ICD-10-PCS | Mod: S$GLB,,, | Performed by: NURSE PRACTITIONER

## 2023-10-31 PROCEDURE — 1101F PT FALLS ASSESS-DOCD LE1/YR: CPT | Mod: CPTII,S$GLB,, | Performed by: NURSE PRACTITIONER

## 2023-10-31 PROCEDURE — 3288F FALL RISK ASSESSMENT DOCD: CPT | Mod: CPTII,S$GLB,, | Performed by: NURSE PRACTITIONER

## 2023-10-31 PROCEDURE — 1125F AMNT PAIN NOTED PAIN PRSNT: CPT | Mod: CPTII,S$GLB,, | Performed by: NURSE PRACTITIONER

## 2023-10-31 PROCEDURE — 1159F MED LIST DOCD IN RCRD: CPT | Mod: CPTII,S$GLB,, | Performed by: NURSE PRACTITIONER

## 2023-10-31 PROCEDURE — 99999 PR PBB SHADOW E&M-EST. PATIENT-LVL V: ICD-10-PCS | Mod: PBBFAC,,, | Performed by: NURSE PRACTITIONER

## 2023-10-31 PROCEDURE — 1159F PR MEDICATION LIST DOCUMENTED IN MEDICAL RECORD: ICD-10-PCS | Mod: CPTII,S$GLB,, | Performed by: NURSE PRACTITIONER

## 2023-10-31 PROCEDURE — 1160F RVW MEDS BY RX/DR IN RCRD: CPT | Mod: CPTII,S$GLB,, | Performed by: NURSE PRACTITIONER

## 2023-10-31 PROCEDURE — 99214 OFFICE O/P EST MOD 30 MIN: CPT | Mod: S$GLB,,, | Performed by: NURSE PRACTITIONER

## 2023-10-31 PROCEDURE — 3288F PR FALLS RISK ASSESSMENT DOCUMENTED: ICD-10-PCS | Mod: CPTII,S$GLB,, | Performed by: NURSE PRACTITIONER

## 2023-10-31 PROCEDURE — 99999 PR PBB SHADOW E&M-EST. PATIENT-LVL V: CPT | Mod: PBBFAC,,, | Performed by: NURSE PRACTITIONER

## 2023-10-31 PROCEDURE — 1160F PR REVIEW ALL MEDS BY PRESCRIBER/CLIN PHARMACIST DOCUMENTED: ICD-10-PCS | Mod: CPTII,S$GLB,, | Performed by: NURSE PRACTITIONER

## 2023-10-31 PROCEDURE — 1125F PR PAIN SEVERITY QUANTIFIED, PAIN PRESENT: ICD-10-PCS | Mod: CPTII,S$GLB,, | Performed by: NURSE PRACTITIONER

## 2023-10-31 PROCEDURE — 1101F PR PT FALLS ASSESS DOC 0-1 FALLS W/OUT INJ PAST YR: ICD-10-PCS | Mod: CPTII,S$GLB,, | Performed by: NURSE PRACTITIONER

## 2023-10-31 RX ORDER — LEVOCETIRIZINE DIHYDROCHLORIDE 5 MG/1
5 TABLET, FILM COATED ORAL NIGHTLY
Qty: 30 TABLET | Refills: 11 | Status: SHIPPED | OUTPATIENT
Start: 2023-10-31 | End: 2024-10-30

## 2023-10-31 RX ORDER — CEPHALEXIN 500 MG/1
500 CAPSULE ORAL EVERY 8 HOURS
Qty: 21 CAPSULE | Refills: 0 | Status: SHIPPED | OUTPATIENT
Start: 2023-10-31 | End: 2023-11-07

## 2023-10-31 NOTE — PROGRESS NOTES
Edie Almanzar [facility]  10/31/2023  [id number]    Erin Gary MD  Patient Care Team:  Erin Gary MD as PCP - General (Family Medicine)  James Reeves MD as Consulting Physician (Ophthalmology)  Jacqueline Darnell MD as Consulting Physician (Allergy and Immunology)  Sherly Hoskins DPM as Consulting Physician (Podiatry)  Faisal Milton MD as Consulting Physician (Cardiology)  Janet Healy MD as Consulting Physician (Hematology and Oncology)          Visit Type:an urgent visit for a new problem    Chief Complaint:  Chief Complaint   Patient presents with    Otalgia       History of Present Illness:    89 yo female presents today with co left ear fullness   History:  Past Medical History:   Diagnosis Date    Diabetes     Glaucoma     Follows with Dr. James Reeves, ophthalmologist    Hand arthritis     Heart murmur     Follows with Dr. Faisal Milton, cardiology    HTN (hypertension)     Hypothyroidism     Intracranial hemorrhage     Postmenopausal     Seasonal allergies     Type 2 diabetes mellitus      Past Surgical History:   Procedure Laterality Date    bladder lift      BREAST BIOPSY      BUNIONECTOMY Bilateral     1970s    CATARACT EXTRACTION Bilateral     1999    CHOLECYSTECTOMY      CRANIOTOMY  2017    s/p fall    FOOT NEUROMA SURGERY Right 1982    R foot - neuroma removed    MYOMECTOMY      TOTAL ABDOMINAL HYSTERECTOMY W/ BILATERAL SALPINGOOPHORECTOMY      due to fibroid     Family History   Problem Relation Age of Onset    No Known Problems Son     Heart attack Mother     Diabetes Sister     Hypertension Sister     Hypertension Sister     Breast cancer Maternal Aunt     Stroke Neg Hx      Social History     Socioeconomic History    Marital status:    Occupational History    Occupation: Retired    Tobacco Use    Smoking status: Never    Smokeless tobacco: Never   Substance and Sexual Activity    Alcohol use: Not Currently    Drug use: Never   Social History Narrative     She wears seatbelt.     Social Determinants of Health     Financial Resource Strain: Low Risk  (10/30/2023)    Overall Financial Resource Strain (CARDIA)     Difficulty of Paying Living Expenses: Not hard at all   Food Insecurity: No Food Insecurity (10/30/2023)    Hunger Vital Sign     Worried About Running Out of Food in the Last Year: Never true     Ran Out of Food in the Last Year: Never true   Transportation Needs: No Transportation Needs (10/30/2023)    PRAPARE - Transportation     Lack of Transportation (Medical): No     Lack of Transportation (Non-Medical): No   Physical Activity: Insufficiently Active (10/30/2023)    Exercise Vital Sign     Days of Exercise per Week: 5 days     Minutes of Exercise per Session: 10 min   Stress: No Stress Concern Present (10/30/2023)    [employer] [place] [employer] - Occupational Stress Questionnaire     Feeling of Stress : Not at all   Social Connections: Moderately Integrated (10/30/2023)    Social Connection and Isolation Panel [NHANES]     Frequency of Communication with Friends and Family: More than three times a week     Frequency of Social Gatherings with Friends and Family: More than three times a week     Attends [employer] Services: More than 4 times per year     Active Member of Clubs or Organizations: Yes     Attends Club or Organization Meetings: More than 4 times per year     Marital Status:    Housing Stability: Low Risk  (10/30/2023)    Housing Stability Vital Sign     Unable to Pay for Housing in the Last Year: No     Number of Places Lived in the Last Year: 1     Unstable Housing in the Last Year: No     Patient Active Problem List   Diagnosis    Hypertension associated with diabetes    Postmenopausal    Hypothyroidism    Cardiac murmur    Overweight (BMI 25.0-29.9)    Aortic stenosis    Type 2 diabetes mellitus without complication, without long-term current use of insulin    History of angioedema    Obstructive hypertrophic cardiomyopathy     Primary open angle glaucoma (POAG) of both eyes, severe stage    History of subdural hematoma    Bilateral hearing loss    Normocytic anemia    Stage 3b chronic kidney disease    Chronic deep vein thrombosis (DVT) of femoral vein of left lower extremity    Bradycardia     Review of patient's allergies indicates:   Allergen Reactions    Brimonidine tartrate Itching and Swelling    Losartan Anaphylaxis and Swelling    Amlodipine besylate Edema     Other reaction(s): swelling of feet    Metformin hcl Diarrhea       The following were reviewed at this visit: active problem list, medication list, allergies, family history, social history, and health maintenance.    Medications:  Current Outpatient Medications on File Prior to Visit   Medication Sig Dispense Refill    amLODIPine (NORVASC) 10 MG tablet Take 10 mg by mouth once daily.      apixaban (ELIQUIS) 5 mg Tab TAKE ONE TABLET BY MOUTH TWICE DAILY 60 tablet 4    blood sugar diagnostic (BLOOD GLUCOSE TEST) Strp Use twice daily prn. Dx: E11.9 300 each 3    bumetanide (BUMEX) 1 MG tablet Take 1 mg by mouth once daily.      cetirizine (ZYRTEC) 10 MG tablet Take 10 mg by mouth once daily.       cloNIDine (CATAPRES) 0.1 MG tablet Take 0.1 mg by mouth 2 (two) times daily.      dorzolamide-timolol 2-0.5% (COSOPT) 22.3-6.8 mg/mL ophthalmic solution 1 drop.      EPINEPHrine (EPIPEN) 0.3 mg/0.3 mL AtIn INJECT 0.3MLS INTO THE MUSCLE ONCE FOR 1 DOSE 2 each 0    famotidine (PEPCID) 40 MG tablet Take 1 tablet (40 mg total) by mouth once daily. 30 tablet 11    ferrous sulfate (FEROSUL) 325 mg (65 mg iron) Tab tablet Take 1 tablet (325 mg total) by mouth once daily. 90 tablet 3    hydrALAZINE (APRESOLINE) 100 MG tablet TAKE ONE TABLET BY MOUTH THREE TIMES DAILY 270 tablet 0    JANUVIA 100 mg Tab TAKE ONE TABLET BY MOUTH ONCE DAILY 90 tablet 0    lancets Misc Use twice daily prn. Dx: E11.9 300 each 3    levothyroxine (SYNTHROID) 50 MCG tablet Take 1 tablet (50 mcg total) by mouth  before breakfast. 90 tablet 3    LUMIGAN 0.01 % Drop Place into both eyes.      montelukast (SINGULAIR) 10 mg tablet TAKE ONE TABLET BY MOUTH IN THE EVENING 30 tablet 0    multivitamin with minerals tablet Take 1 tablet by mouth once daily.       nitroGLYCERIN (NITROSTAT) 0.4 MG SL tablet Place under the tongue.      nystatin (MYCOSTATIN) cream Apply topically 2 (two) times daily. 30 g 0    blood-glucose meter kit Use as instructed - dx: E11.9 1 each 0     No current facility-administered medications on file prior to visit.       Medications have been reviewed and reconciled with patient at this visit.  Barriers to medications reviewed with patient.    Adverse reactions to current medications reviewed with patient..    Over the counter medications reviewed and reconciled with patient.    Exam:  Wt Readings from Last 3 Encounters:   10/31/23 62.6 kg (138 lb 0.1 oz)   10/09/23 61.5 kg (135 lb 9.3 oz)   08/11/23 62.4 kg (137 lb 9.1 oz)     Temp Readings from Last 3 Encounters:   10/31/23 97.7 °F (36.5 °C)   10/09/23 97.2 °F (36.2 °C) (Tympanic)   08/11/23 97.5 °F (36.4 °C) (Temporal)     BP Readings from Last 3 Encounters:   10/31/23 120/70   10/09/23 132/62   08/11/23 (!) 145/64     Pulse Readings from Last 3 Encounters:   10/31/23 87   10/09/23 68   08/11/23 62     Body mass index is 27.87 kg/m².      Review of Systems   Constitutional:  Negative for fever.   HENT:  Positive for ear pain.    Respiratory:  Negative for cough, shortness of breath and wheezing.    Cardiovascular:  Negative for chest pain and palpitations.   Gastrointestinal:  Negative for nausea.   Neurological:  Negative for speech change, weakness and headaches.   All other systems reviewed and are negative.    Physical Exam  Vitals and nursing note reviewed.   Constitutional:       Appearance: Normal appearance. She is obese.   HENT:      Head: Normocephalic and atraumatic.      Right Ear: Tympanic membrane, ear canal and external ear normal.  Decreased hearing noted. No drainage, swelling or tenderness.      Left Ear: Tympanic membrane, ear canal and external ear normal. Decreased hearing noted. Drainage, swelling and tenderness present.      Ears:        Nose: Nose normal.      Mouth/Throat:      Mouth: Mucous membranes are moist.      Pharynx: Oropharynx is clear.   Eyes:      Extraocular Movements: Extraocular movements intact.      Conjunctiva/sclera: Conjunctivae normal.      Pupils: Pupils are equal, round, and reactive to light.   Cardiovascular:      Rate and Rhythm: Normal rate and regular rhythm.      Pulses: Normal pulses.      Heart sounds: Normal heart sounds.   Pulmonary:      Effort: Pulmonary effort is normal.      Breath sounds: Normal breath sounds.   Abdominal:      General: Bowel sounds are normal.      Palpations: Abdomen is soft.   Musculoskeletal:         General: Normal range of motion.      Cervical back: Normal range of motion and neck supple.   Skin:     General: Skin is warm and dry.      Capillary Refill: Capillary refill takes less than 2 seconds.   Neurological:      General: No focal deficit present.      Mental Status: She is alert and oriented to person, place, and time.   Psychiatric:         Mood and Affect: Mood normal.         Behavior: Behavior normal.         Thought Content: Thought content normal.         Judgment: Judgment normal.           Laboratory Reviewed ({Yes)  Lab Results   Component Value Date    WBC 10.69 08/04/2023    HGB 11.6 (L) 08/04/2023    HCT 34.0 (L) 08/04/2023     08/04/2023    CHOL 193 10/09/2023    TRIG 133 10/09/2023    HDL 57 10/09/2023    ALT 9 (L) 10/09/2023    AST 23 10/09/2023     10/09/2023    K 3.4 (L) 10/09/2023    CL 98 10/09/2023    CREATININE 1.0 10/09/2023    BUN 21 10/09/2023    CO2 26 10/09/2023    TSH 3.428 10/09/2023    HGBA1C 6.1 (H) 10/09/2023       Edie was seen today for otalgia.    Diagnoses and all orders for this visit:    Swelling of external ear,  left  -     cephALEXin (KEFLEX) 500 MG capsule; Take 1 capsule (500 mg total) by mouth every 8 (eight) hours. for 7 days    Sinus congestion  -     levocetirizine (XYZAL) 5 MG tablet; Take 1 tablet (5 mg total) by mouth every evening.    Ear fullness, left  -     Ambulatory referral/consult to ENT; Future    Bilateral hearing loss, unspecified hearing loss type    Cardiac murmur    Hypertension associated with diabetes        PAN   Start keflex  Follow up ENT         Care Plan/Goals: Reviewed    Goals    None     Edie was seen today for otalgia.    Diagnoses and all orders for this visit:    Swelling of external ear, left  -     cephALEXin (KEFLEX) 500 MG capsule; Take 1 capsule (500 mg total) by mouth every 8 (eight) hours. for 7 days    Sinus congestion  -     levocetirizine (XYZAL) 5 MG tablet; Take 1 tablet (5 mg total) by mouth every evening.    Ear fullness, left  -     Ambulatory referral/consult to ENT; Future    Bilateral hearing loss, unspecified hearing loss type    Cardiac murmur    Hypertension associated with diabetes         Follow up: Follow up for With ENT .    After visit summary was printed and given to patient upon discharge today.  Patient goals and care plan are included in After Visit Summary.

## 2023-11-01 ENCOUNTER — TELEPHONE (OUTPATIENT)
Dept: FAMILY MEDICINE | Facility: CLINIC | Age: 88
End: 2023-11-01
Payer: MEDICARE

## 2023-11-09 ENCOUNTER — TELEPHONE (OUTPATIENT)
Dept: FAMILY MEDICINE | Facility: CLINIC | Age: 88
End: 2023-11-09
Payer: MEDICARE

## 2023-11-09 NOTE — TELEPHONE ENCOUNTER
----- Message from Verona Smith sent at 11/9/2023  9:25 AM CST -----  Contact: Edie  Type:  Patient Returning Call  Who Called:Edie  Who Left Message for Patient:Tavia Bar LPN   Does the patient know what this is regarding?:re certification  Would the patient rather a call back or a response via [facility]? Call back  Best Call Back Number:[phone]  Additional Information:

## 2023-11-15 ENCOUNTER — HOSPITAL ENCOUNTER (OUTPATIENT)
Dept: RADIOLOGY | Facility: HOSPITAL | Age: 88
Discharge: HOME | End: 2023-11-15
Attending: INTERNAL MEDICINE
Payer: MEDICARE

## 2023-11-15 DIAGNOSIS — I82.512 CHRONIC DEEP VEIN THROMBOSIS (DVT) OF FEMORAL VEIN OF LEFT LOWER EXTREMITY: ICD-10-CM

## 2023-11-15 PROCEDURE — 93971 US LOWER EXTREMITY VEINS LEFT: ICD-10-PCS | Mod: 26,LT,, | Performed by: RADIOLOGY

## 2023-11-15 PROCEDURE — 93971 EXTREMITY STUDY: CPT | Mod: TC,LT

## 2023-11-15 PROCEDURE — 93971 EXTREMITY STUDY: CPT | Mod: 26,LT,, | Performed by: RADIOLOGY

## 2023-11-17 DIAGNOSIS — T78.3XXA ANGIOEDEMA, INITIAL ENCOUNTER: ICD-10-CM

## 2023-11-20 ENCOUNTER — OFFICE VISIT (OUTPATIENT)
Dept: HEMATOLOGY/ONCOLOGY | Facility: CLINIC | Age: 88
End: 2023-11-20
Payer: MEDICARE

## 2023-11-20 VITALS
HEIGHT: 59 IN | RESPIRATION RATE: 20 BRPM | WEIGHT: 138.88 LBS | SYSTOLIC BLOOD PRESSURE: 163 MMHG | HEART RATE: 71 BPM | BODY MASS INDEX: 28 KG/M2 | DIASTOLIC BLOOD PRESSURE: 56 MMHG | OXYGEN SATURATION: 98 % | TEMPERATURE: 97 F

## 2023-11-20 DIAGNOSIS — D50.0 IRON DEFICIENCY ANEMIA DUE TO CHRONIC BLOOD LOSS: ICD-10-CM

## 2023-11-20 DIAGNOSIS — I82.512 CHRONIC DEEP VEIN THROMBOSIS (DVT) OF FEMORAL VEIN OF LEFT LOWER EXTREMITY: ICD-10-CM

## 2023-11-20 DIAGNOSIS — I35.0 NONRHEUMATIC AORTIC VALVE STENOSIS: ICD-10-CM

## 2023-11-20 DIAGNOSIS — D64.9 NORMOCYTIC ANEMIA: Primary | ICD-10-CM

## 2023-11-20 PROCEDURE — 1160F RVW MEDS BY RX/DR IN RCRD: CPT | Mod: CPTII,S$GLB,, | Performed by: INTERNAL MEDICINE

## 2023-11-20 PROCEDURE — 1160F PR REVIEW ALL MEDS BY PRESCRIBER/CLIN PHARMACIST DOCUMENTED: ICD-10-PCS | Mod: CPTII,S$GLB,, | Performed by: INTERNAL MEDICINE

## 2023-11-20 PROCEDURE — 1101F PR PT FALLS ASSESS DOC 0-1 FALLS W/OUT INJ PAST YR: ICD-10-PCS | Mod: CPTII,S$GLB,, | Performed by: INTERNAL MEDICINE

## 2023-11-20 PROCEDURE — 99214 PR OFFICE/OUTPT VISIT, EST, LEVL IV, 30-39 MIN: ICD-10-PCS | Mod: S$GLB,,, | Performed by: INTERNAL MEDICINE

## 2023-11-20 PROCEDURE — 3288F FALL RISK ASSESSMENT DOCD: CPT | Mod: CPTII,S$GLB,, | Performed by: INTERNAL MEDICINE

## 2023-11-20 PROCEDURE — 3288F PR FALLS RISK ASSESSMENT DOCUMENTED: ICD-10-PCS | Mod: CPTII,S$GLB,, | Performed by: INTERNAL MEDICINE

## 2023-11-20 PROCEDURE — 1159F MED LIST DOCD IN RCRD: CPT | Mod: CPTII,S$GLB,, | Performed by: INTERNAL MEDICINE

## 2023-11-20 PROCEDURE — 1126F PR PAIN SEVERITY QUANTIFIED, NO PAIN PRESENT: ICD-10-PCS | Mod: CPTII,S$GLB,, | Performed by: INTERNAL MEDICINE

## 2023-11-20 PROCEDURE — 1101F PT FALLS ASSESS-DOCD LE1/YR: CPT | Mod: CPTII,S$GLB,, | Performed by: INTERNAL MEDICINE

## 2023-11-20 PROCEDURE — 99999 PR PBB SHADOW E&M-EST. PATIENT-LVL V: CPT | Mod: PBBFAC,,, | Performed by: INTERNAL MEDICINE

## 2023-11-20 PROCEDURE — 1159F PR MEDICATION LIST DOCUMENTED IN MEDICAL RECORD: ICD-10-PCS | Mod: CPTII,S$GLB,, | Performed by: INTERNAL MEDICINE

## 2023-11-20 PROCEDURE — 99999 PR PBB SHADOW E&M-EST. PATIENT-LVL V: ICD-10-PCS | Mod: PBBFAC,,, | Performed by: INTERNAL MEDICINE

## 2023-11-20 PROCEDURE — 99214 OFFICE O/P EST MOD 30 MIN: CPT | Mod: S$GLB,,, | Performed by: INTERNAL MEDICINE

## 2023-11-20 PROCEDURE — 1126F AMNT PAIN NOTED NONE PRSNT: CPT | Mod: CPTII,S$GLB,, | Performed by: INTERNAL MEDICINE

## 2023-11-20 RX ORDER — HEPARIN 100 UNIT/ML
500 SYRINGE INTRAVENOUS
OUTPATIENT
Start: 2023-11-27

## 2023-11-20 RX ORDER — DESOXIMETASONE 2.5 MG/G
CREAM TOPICAL
COMMUNITY

## 2023-11-20 RX ORDER — MONTELUKAST SODIUM 10 MG/1
TABLET ORAL
Qty: 30 TABLET | Refills: 0 | Status: SHIPPED | OUTPATIENT
Start: 2023-11-20 | End: 2023-12-21

## 2023-11-20 RX ORDER — SODIUM CHLORIDE 0.9 % (FLUSH) 0.9 %
10 SYRINGE (ML) INJECTION
OUTPATIENT
Start: 2023-11-20

## 2023-11-20 RX ORDER — FLASH GLUCOSE SENSOR
KIT MISCELLANEOUS
COMMUNITY
End: 2024-01-12

## 2023-11-20 RX ORDER — SODIUM CHLORIDE 0.9 % (FLUSH) 0.9 %
10 SYRINGE (ML) INJECTION
OUTPATIENT
Start: 2023-11-27

## 2023-11-20 RX ORDER — CODEINE PHOSPHATE AND GUAIFENESIN 7.5; 225 MG/5ML; MG/5ML
SYRUP ORAL
COMMUNITY
End: 2024-01-12

## 2023-11-20 RX ORDER — HEPARIN 100 UNIT/ML
500 SYRINGE INTRAVENOUS
OUTPATIENT
Start: 2023-11-20

## 2023-11-20 NOTE — PROGRESS NOTES
Subjective:       Patient ID: Edie Hammond is a 89 y.o. female.    Chief Complaint: Results and Anemia    HPI:  83-year-old female with progressive anemia with documented iron deficiency patient reports increasing fatigue and weakness shortness of breath patient returns for follow-up    Past Medical History:   Diagnosis Date    Diabetes     Glaucoma     Follows with Dr. James Reeves, ophthalmologist    Hand arthritis     Heart murmur     Follows with Dr. Faisal Milton, cardiology    HTN (hypertension)     Hypothyroidism     Intracranial hemorrhage     Postmenopausal     Seasonal allergies     Type 2 diabetes mellitus      Family History   Problem Relation Age of Onset    No Known Problems Son     Heart attack Mother     Diabetes Sister     Hypertension Sister     Hypertension Sister     Breast cancer Maternal Aunt     Stroke Neg Hx      Social History     Socioeconomic History    Marital status:    Occupational History    Occupation: Retired    Tobacco Use    Smoking status: Never    Smokeless tobacco: Never   Substance and Sexual Activity    Alcohol use: Not Currently    Drug use: Never   Social History Narrative    She wears seatbelt.     Social Determinants of Health     Financial Resource Strain: Low Risk  (11/17/2023)    Overall Financial Resource Strain (CARDIA)     Difficulty of Paying Living Expenses: Not hard at all   Food Insecurity: No Food Insecurity (11/17/2023)    Hunger Vital Sign     Worried About Running Out of Food in the Last Year: Never true     Ran Out of Food in the Last Year: Never true   Transportation Needs: No Transportation Needs (11/17/2023)    PRAPARE - Transportation     Lack of Transportation (Medical): No     Lack of Transportation (Non-Medical): No   Physical Activity: Insufficiently Active (11/17/2023)    Exercise Vital Sign     Days of Exercise per Week: 7 days     Minutes of Exercise per Session: 10 min   Stress: No  Stress Concern Present (11/17/2023)    [employer] [place] [employer] - Occupational Stress Questionnaire     Feeling of Stress : Not at all   Social Connections: Moderately Integrated (11/17/2023)    Social Connection and Isolation Panel [NHANES]     Frequency of Communication with Friends and Family: More than three times a week     Frequency of Social Gatherings with Friends and Family: More than three times a week     Attends [employer] Services: More than 4 times per year     Active Member of Clubs or Organizations: Yes     Attends Club or Organization Meetings: More than 4 times per year     Marital Status:    Housing Stability: Low Risk  (11/17/2023)    Housing Stability Vital Sign     Unable to Pay for Housing in the Last Year: No     Number of Places Lived in the Last Year: 2     Unstable Housing in the Last Year: No     Past Surgical History:   Procedure Laterality Date    bladder lift      BREAST BIOPSY      BUNIONECTOMY Bilateral     1970s    CATARACT EXTRACTION Bilateral     1999    CHOLECYSTECTOMY      CRANIOTOMY  2017    s/p fall    FOOT NEUROMA SURGERY Right 1982    R foot - neuroma removed    MYOMECTOMY      TOTAL ABDOMINAL HYSTERECTOMY W/ BILATERAL SALPINGOOPHORECTOMY      due to fibroid       Labs:  Lab Results   Component Value Date    WBC 12.89 (H) 11/15/2023    HGB 8.7 (L) 11/15/2023    HCT 25.9 (L) 11/15/2023    MCV 88 11/15/2023     11/15/2023     BMP  Lab Results   Component Value Date     10/09/2023    K 3.4 (L) 10/09/2023    CL 98 10/09/2023    CO2 26 10/09/2023    BUN 21 10/09/2023    CREATININE 1.0 10/09/2023    CALCIUM 9.6 10/09/2023    ANIONGAP 12 10/09/2023    ESTGFRAFRICA >60.0 06/28/2022    EGFRNONAA >60.0 06/28/2022     Lab Results   Component Value Date    ALT 9 (L) 10/09/2023    AST 23 10/09/2023    ALKPHOS 77 10/09/2023    BILITOT 0.2 10/09/2023       Lab Results   Component Value Date    IRON 26 (L) 11/15/2023    TIBC 263  11/15/2023    FERRITIN 262 11/15/2023     Lab Results   Component Value Date    GIEZOIDQ54 1235 (H) 10/21/2022     Lab Results   Component Value Date    FOLATE 32.7 (H) 10/21/2022     Lab Results   Component Value Date    TSH 3.428 10/09/2023         Review of Systems   Constitutional:  Negative for activity change, appetite change, chills, diaphoresis, fatigue, fever and unexpected weight change.   HENT:  Negative for congestion, dental problem, drooling, ear discharge, ear pain, facial swelling, hearing loss, mouth sores, nosebleeds, postnasal drip, rhinorrhea, sinus pressure, sneezing, sore throat, tinnitus, trouble swallowing and voice change.    Eyes:  Negative for photophobia, pain, discharge, redness, itching and visual disturbance.   Respiratory:  Positive for shortness of breath. Negative for cough, choking, chest tightness, wheezing and stridor.    Cardiovascular:  Negative for chest pain, palpitations and leg swelling.   Gastrointestinal:  Negative for abdominal distention, abdominal pain, anal bleeding, blood in stool, constipation, diarrhea, nausea, rectal pain and vomiting.   Endocrine: Negative for cold intolerance, heat intolerance, polydipsia, polyphagia and polyuria.   Genitourinary:  Negative for decreased urine volume, difficulty urinating, dyspareunia, dysuria, enuresis, flank pain, frequency, genital sores, hematuria, menstrual problem, pelvic pain, urgency, vaginal bleeding, vaginal discharge and vaginal pain.   Musculoskeletal:  Positive for arthralgias, gait problem and myalgias. Negative for back pain, joint swelling, neck pain and neck stiffness.   Skin:  Negative for color change, pallor and rash.   Allergic/Immunologic: Negative for environmental allergies, food allergies and immunocompromised state.   Neurological:  Negative for dizziness, tremors, seizures, syncope, facial asymmetry, speech difficulty, weakness, light-headedness, numbness and headaches.   Hematological:  Negative for  adenopathy. Does not bruise/bleed easily.   Psychiatric/Behavioral:  Negative for agitation, behavioral problems, confusion, decreased concentration, hallucinations, self-injury, sleep disturbance and suicidal ideas. The patient is not hyperactive.        Objective:      Physical Exam  Vitals reviewed.   Constitutional:       General: She is not in acute distress.     Appearance: She is well-developed. She is not diaphoretic.   HENT:      Head: Normocephalic and atraumatic.      Right Ear: External ear normal.      Left Ear: External ear normal.      Nose: Nose normal.      Right Sinus: No maxillary sinus tenderness or frontal sinus tenderness.      Left Sinus: No maxillary sinus tenderness or frontal sinus tenderness.      Mouth/Throat:      Pharynx: No oropharyngeal exudate.   Eyes:      General: Lids are normal. No scleral icterus.        Right eye: No discharge.         Left eye: No discharge.      Conjunctiva/sclera: Conjunctivae normal.      Right eye: Right conjunctiva is not injected. No hemorrhage.     Left eye: Left conjunctiva is not injected. No hemorrhage.     Pupils: Pupils are equal, round, and reactive to light.   Neck:      Thyroid: No thyromegaly.      Vascular: No JVD.      Trachea: No tracheal deviation.   Cardiovascular:      Rate and Rhythm: Normal rate.   Pulmonary:      Effort: Pulmonary effort is normal. No respiratory distress.      Breath sounds: No stridor.   Chest:      Chest wall: No tenderness.   Abdominal:      General: Bowel sounds are normal. There is no distension.      Palpations: Abdomen is soft. There is no hepatomegaly, splenomegaly or mass.      Tenderness: There is no abdominal tenderness. There is no rebound.   Musculoskeletal:         General: No tenderness. Normal range of motion.      Cervical back: Normal range of motion and neck supple.   Lymphadenopathy:      Cervical: No cervical adenopathy.      Upper Body:      Right upper body: No supraclavicular adenopathy.       Left upper body: No supraclavicular adenopathy.   Skin:     General: Skin is dry.      Findings: No erythema or rash.   Neurological:      Mental Status: She is alert and oriented to person, place, and time.      Cranial Nerves: No cranial nerve deficit.      Motor: Weakness present.      Coordination: Coordination abnormal.      Gait: Gait abnormal.   Psychiatric:         Behavior: Behavior normal.         Thought Content: Thought content normal.         Judgment: Judgment normal.           Assessment:      1. Normocytic anemia    2. Iron deficiency anemia due to chronic blood loss    3. Chronic deep vein thrombosis (DVT) of femoral vein of left lower extremity    4. Nonrheumatic aortic valve stenosis           Med Onc Chart Routing      Follow up with physician 3 months. Return to clinic in 3 months CBC iron status CMP prior   Follow up with [facility]    Infusion scheduling note    Injection scheduling note Set up intravenous Feraheme   Labs    Imaging    Pharmacy appointment    Other referrals         Patient needs EGD colonoscopies          Plan:     Progressive anemia.  Documented iron deficiency recommend EGD: Intravenous iron intolerant and nonresponsive to oral iron.  Follow-up in 3 months CBC iron status prior orders written reviewed        Teja Munguia Jr, MD FACP

## 2023-11-22 ENCOUNTER — TELEPHONE (OUTPATIENT)
Dept: FAMILY MEDICINE | Facility: CLINIC | Age: 88
End: 2023-11-22
Payer: MEDICARE

## 2023-11-22 DIAGNOSIS — D64.9 ANEMIA, UNSPECIFIED TYPE: Primary | ICD-10-CM

## 2023-11-22 NOTE — TELEPHONE ENCOUNTER
----- Message from Ophelia Arce sent at 11/22/2023 10:19 AM CST -----  Contact: Edie abe  [phone]  [email or web address]     Patient is calling for Medical Advice regarding:    How long has patient had these symptoms:    Pharmacy name and phone#:    Would like response via MyTradet:call back    Comments: Pt is requesting a referral to gastro to see Dr Guillermo [email or web address] [facility] ph# [phone]

## 2023-11-22 NOTE — TELEPHONE ENCOUNTER
Patient is requesting a referral to  Dr Guillermo [email or web address] [facility] ph# [phone] pre her oncologist due to her anemia is not getting better with infusion . Recommending she have a colonoscopy done

## 2023-11-22 NOTE — TELEPHONE ENCOUNTER
I have put the following orders and/or medications to this note.  Please advise pt and assist.    Orders Placed This Encounter   Procedures    Ambulatory referral/consult to Gastroenterology     Standing Status:   Future     Standing Expiration Date:   12/22/2024     Referral Priority:   Routine     Referral Type:   Consultation     Referral Reason:   Specialty Services Required     Referred to Provider:   Guillermo Orozco Jr., MD     Requested Specialty:   Gastroenterology     Number of Visits Requested:   1

## 2023-11-24 ENCOUNTER — PATIENT MESSAGE (OUTPATIENT)
Dept: HEMATOLOGY/ONCOLOGY | Facility: CLINIC | Age: 88
End: 2023-11-24
Payer: MEDICARE

## 2023-11-28 ENCOUNTER — TELEPHONE (OUTPATIENT)
Dept: FAMILY MEDICINE | Facility: CLINIC | Age: 88
End: 2023-11-28
Payer: MEDICARE

## 2023-11-28 NOTE — TELEPHONE ENCOUNTER
----- Message from Gabino Scott sent at 11/28/2023 11:59 AM CST -----  Contact: Ileana Smith  Would like a call back at [phone] ext [phone] , in regards to verifying if the re certification for the pt Plan of care for physical therapy has been received. He states the date of service is 11/2/2023.

## 2023-12-04 ENCOUNTER — TELEPHONE (OUTPATIENT)
Dept: INFUSION THERAPY | Facility: HOSPITAL | Age: 88
End: 2023-12-04
Payer: MEDICARE

## 2023-12-04 NOTE — TELEPHONE ENCOUNTER
----- Message from Balta Johnson RN sent at 12/1/2023  4:45 PM CST -----  Contact: Edie    ----- Message -----  From: Saroj Garay  Sent: 12/1/2023   4:39 PM CST  To: [facility] Staff    Pt is calling in regards to appt. Please call back at  [phone].      Thanks  SHRADDHA

## 2023-12-05 ENCOUNTER — TELEPHONE (OUTPATIENT)
Dept: FAMILY MEDICINE | Facility: CLINIC | Age: 88
End: 2023-12-05
Payer: MEDICARE

## 2023-12-05 DIAGNOSIS — D64.9 ANEMIA, UNSPECIFIED TYPE: Primary | ICD-10-CM

## 2023-12-05 NOTE — TELEPHONE ENCOUNTER
----- Message from Saroj Garay sent at 12/1/2023  4:47 PM CST -----  Contact: Edie Osorio is requesting referral for Dr. Gerald Miletello non [facility] hematologist. Pt is not satisfied with Dr. Munguia. Please call back at .[phone].        Thanks  SHRADDHA

## 2023-12-05 NOTE — TELEPHONE ENCOUNTER
Pt is requesting referral for Dr. Kavin arriola [facility] hematologist. Pt is not satisfied with Dr. Munguia.

## 2023-12-06 NOTE — TELEPHONE ENCOUNTER
Kavin Godinez MD (Hematology & Oncology, Medical Oncology)    [address] [facility] [address], [place], [place] [id number] [phone]    Fax# [phone]

## 2023-12-06 NOTE — TELEPHONE ENCOUNTER
Patient contacted and informed that Dr. Gary has placed her referral and it's being faxed to Dr. Kavin Godinez to fax number [phone]. Patient verbally understood the information given.

## 2023-12-06 NOTE — TELEPHONE ENCOUNTER
I have put the following orders and/or medications to this note.  Please advise pt and assist by faxing referral.    Orders Placed This Encounter   Procedures    Ambulatory referral/consult to Hematology / Oncology     Standing Status:   Future     Standing Expiration Date:   1/6/2025     Referral Priority:   Routine     Referral Type:   Consultation     Referral Reason:   Patient Preference     Requested Specialty:   Hematology and Oncology     Number of Visits Requested:   1

## 2023-12-08 ENCOUNTER — TELEPHONE (OUTPATIENT)
Dept: FAMILY MEDICINE | Facility: CLINIC | Age: 88
End: 2023-12-08
Payer: MEDICARE

## 2023-12-21 DIAGNOSIS — T78.3XXA ANGIOEDEMA, INITIAL ENCOUNTER: ICD-10-CM

## 2023-12-21 RX ORDER — MONTELUKAST SODIUM 10 MG/1
TABLET ORAL
Qty: 30 TABLET | Refills: 0 | Status: SHIPPED | OUTPATIENT
Start: 2023-12-21 | End: 2024-01-23

## 2023-12-25 DIAGNOSIS — E11.9 TYPE 2 DIABETES MELLITUS WITHOUT COMPLICATION, WITHOUT LONG-TERM CURRENT USE OF INSULIN: ICD-10-CM

## 2023-12-26 NOTE — TELEPHONE ENCOUNTER
No care due was identified.  [facility] Embedded Care Due Messages. Reference number: [id number].   12/25/2023 8:59:56 PM CST

## 2023-12-27 RX ORDER — SITAGLIPTIN 100 MG/1
TABLET, FILM COATED ORAL
Qty: 90 TABLET | Refills: 1 | Status: SHIPPED | OUTPATIENT
Start: 2023-12-27

## 2023-12-27 NOTE — TELEPHONE ENCOUNTER
Refill Decision Note   Edie Riteshkeon  is requesting a refill authorization.  Brief Assessment and Rationale for Refill:  Approve     Medication Therapy Plan:         Comments:     Note composed:3:33 AM 12/27/2023

## 2024-01-12 ENCOUNTER — OFFICE VISIT (OUTPATIENT)
Dept: FAMILY MEDICINE | Facility: CLINIC | Age: 89
End: 2024-01-12
Attending: FAMILY MEDICINE
Payer: MEDICARE

## 2024-01-12 VITALS
HEIGHT: 59 IN | WEIGHT: 132.25 LBS | OXYGEN SATURATION: 96 % | SYSTOLIC BLOOD PRESSURE: 142 MMHG | TEMPERATURE: 98 F | HEART RATE: 80 BPM | DIASTOLIC BLOOD PRESSURE: 68 MMHG | BODY MASS INDEX: 26.66 KG/M2

## 2024-01-12 DIAGNOSIS — H91.93 BILATERAL HEARING LOSS, UNSPECIFIED HEARING LOSS TYPE: ICD-10-CM

## 2024-01-12 DIAGNOSIS — E11.59 HYPERTENSION ASSOCIATED WITH DIABETES: ICD-10-CM

## 2024-01-12 DIAGNOSIS — I35.0 NONRHEUMATIC AORTIC VALVE STENOSIS: ICD-10-CM

## 2024-01-12 DIAGNOSIS — I82.512 CHRONIC DEEP VEIN THROMBOSIS (DVT) OF FEMORAL VEIN OF LEFT LOWER EXTREMITY: ICD-10-CM

## 2024-01-12 DIAGNOSIS — I50.30 DIASTOLIC CONGESTIVE HEART FAILURE, UNSPECIFIED HF CHRONICITY: ICD-10-CM

## 2024-01-12 DIAGNOSIS — D64.9 NORMOCYTIC ANEMIA: ICD-10-CM

## 2024-01-12 DIAGNOSIS — N18.32 STAGE 3B CHRONIC KIDNEY DISEASE: ICD-10-CM

## 2024-01-12 DIAGNOSIS — I15.2 HYPERTENSION ASSOCIATED WITH DIABETES: ICD-10-CM

## 2024-01-12 DIAGNOSIS — D50.0 IRON DEFICIENCY ANEMIA DUE TO CHRONIC BLOOD LOSS: ICD-10-CM

## 2024-01-12 DIAGNOSIS — I42.1 OBSTRUCTIVE HYPERTROPHIC CARDIOMYOPATHY: ICD-10-CM

## 2024-01-12 PROCEDURE — 99214 OFFICE O/P EST MOD 30 MIN: CPT | Mod: S$GLB,,, | Performed by: FAMILY MEDICINE

## 2024-01-12 PROCEDURE — 99999 PR PBB SHADOW E&M-EST. PATIENT-LVL V: CPT | Mod: PBBFAC,,, | Performed by: FAMILY MEDICINE

## 2024-01-12 NOTE — PROGRESS NOTES
Edie Hammond    Chief Complaint   Patient presents with    Follow-up    Anemia       History of Present Illness:   Ms. Hammond comes in today for anemia follow-up.  She states she is not fasting but has taken medication today.  She states she saw Dr. Kavin Godinez, hematologist, for evaluation of mormocytic anemia, iron deficiency anemia due to chronic blood loss, chronic deep vein thrombosis (DVT) of femoral vein of left lower extremity, nonrheumatic aortic valve stenosis and received blood transfusion approximately 1 month ago followed by iron (Monoferric) transfusion 1 week later.  She states she was Eliquis was discontinued and she had blood test approximately 3 weeks ago which showed hemoglobin of 10.  She states her energy is slowly returning.  She states she is scheduled to see Dr. Godinez with labs on February 8, 2024.    She states she noticed orange mucus in her stools last week.  She states she continues to see mucus this week but much less.  She denies having other associated GI symptoms.  She states she ate a specific seasonal fruit the day before she 1st noticed orange mucus in her stools.    She states she saw Dr. 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    She states she has not been exercising as physical therapy is on hold until she has more energy and as she has been having nontraumatic pain in her left knee.  She states she ambulates with assistance of rolling walker.  She states she performs home glucose checks every morning with levels ranging 100's (117 this morning).    She complains of having occasional, intermittent right ear ache.    Otherwise, she denies having fever, chills, fatigue, appetite  changes; shortness of breath, cough, wheezing; chest pain, palpitations, leg swelling; other sinus symptoms; abdominal pain, nausea, vomiting, diarrhea, constipation; unusual urinary symptoms; polydipsia, polyphagia, polyuria, hot or cold intolerance; back pain; acute visual changes, numbness, headache; anxiety, depression, homicidal or suicidal thoughts.           Labs:                      WBC                      12.89 (H)           11/15/2023                 HGB                      8.7 (L)             11/15/2023                 HCT                      25.9 (L)            11/15/2023                 PLT                      424                 11/15/2023                 CHOL                     193                 10/09/2023                 TRIG                     133                 10/09/2023                 HDL                      57                  10/09/2023                 ALT                      9 (L)               10/09/2023                 AST                      23                  10/09/2023                 NA                       136                 10/09/2023                 K                        3.4 (L)             10/09/2023                 CL                       98                  10/09/2023                 CREATININE               1.0                 10/09/2023                 BUN                      21                  10/09/2023                 CO2                      26                  10/09/2023                 TSH                      3.428               10/09/2023                 HGBA1C                   6.1 (H)             10/09/2023            LDLCALC                  109.4               10/09/2023            CALCIUM                  9.6                 10/09/2023                 ANIONGAP                 12                  10/09/2023                 EGFRNORACEVR             52.5 (A)            10/09/2023                     Current Outpatient Medications   Medication Sig     amLODIPine (NORVASC) 10 MG tablet Take 10 mg by mouth once daily.    blood sugar diagnostic (BLOOD GLUCOSE TEST) Strp Use twice daily prn. Dx: E11.9    bumetanide (BUMEX) 1 MG tablet Take 1 mg by mouth once daily.    cetirizine (ZYRTEC) 10 MG tablet Take 10 mg by mouth once daily.     cloNIDine (CATAPRES) 0.1 MG tablet Take 0.1 mg by mouth 2 (two) times daily.    desoximetasone (TOPICORT) 0.25 % cream 1 application to affected area Externally Twice a day as needed for 14 days    dorzolamide-timolol 2-0.5% (COSOPT) 22.3-6.8 mg/mL ophthalmic solution 1 drop.    famotidine (PEPCID) 40 MG tablet Take 1 tablet (40 mg total) by mouth once daily.    hydrALAZINE (APRESOLINE) 100 MG tablet TAKE ONE TABLET BY MOUTH THREE TIMES DAILY    JANUVIA 100 mg Tab TAKE ONE TABLET BY MOUTH ONCE DAILY    lancets Misc Use twice daily prn. Dx: E11.9    levocetirizine (XYZAL) 5 MG tablet Take 1 tablet (5 mg total) by mouth every evening.    levothyroxine (SYNTHROID) 50 MCG tablet Take 1 tablet (50 mcg total) by mouth before breakfast.    LUMIGAN 0.01 % Drop Place into both eyes.    montelukast (SINGULAIR) 10 mg tablet TAKE ONE TABLET BY MOUTH IN THE EVENING    multivitamin with minerals tablet Take 1 tablet by mouth once daily.     nitroGLYCERIN (NITROSTAT) 0.4 MG SL tablet Place under the tongue.    nystatin (MYCOSTATIN) cream Apply topically 2 (two) times daily.    blood-glucose meter kit Use as instructed - dx: E11.9    EPINEPHrine (EPIPEN) 0.3 mg/0.3 mL AtIn INJECT 0.3MLS INTO THE MUSCLE ONCE FOR 1 DOSE (Patient not taking: Reported on 1/12/2024)         Review of Systems   Constitutional:  Negative for activity change, appetite change, chills, fatigue and fever.        Weight 61.5 kg (135 lb 9.3 oz) at October 9, 2023 visit.   HENT:  Positive for ear pain.         See history of present illness.   Eyes:  Negative for visual disturbance.   Respiratory:  Negative for cough, shortness of breath and wheezing.    Cardiovascular:   Negative for chest pain, palpitations and leg swelling.   Gastrointestinal:  Negative for abdominal pain, constipation, diarrhea, nausea and vomiting.        See history of present illness.   Endocrine: Negative for cold intolerance, heat intolerance, polydipsia, polyphagia and polyuria.        See history of present illness.   Genitourinary:  Negative for difficulty urinating.   Musculoskeletal:  Negative for back pain.   Neurological:  Negative for numbness and headaches.   Hematological:         See history of present illness.   Psychiatric/Behavioral:  Negative for dysphoric mood and suicidal ideas. The patient is not nervous/anxious.         Negative for homicidal ideas.       Objective:  Physical Exam  Vitals and nursing note reviewed.   Constitutional:       General: She is not in acute distress.     Appearance: Normal appearance. She is well-developed. She is not ill-appearing, toxic-appearing or diaphoretic.   HENT:      Right Ear: Tympanic membrane, ear canal and external ear normal. There is no impacted cerumen.      Left Ear: Tympanic membrane, ear canal and external ear normal. There is no impacted cerumen.      Ears:      Comments: Bilateral hearing aids.  Cardiovascular:      Rate and Rhythm: Normal rate and regular rhythm.      Pulses:           Dorsalis pedis pulses are 3+ on the right side and 3+ on the left side.      Heart sounds: Murmur heard.      No gallop.   Pulmonary:      Effort: Pulmonary effort is normal.      Breath sounds: Normal breath sounds.   Abdominal:      General: Bowel sounds are normal. There is no distension.      Palpations: Abdomen is soft. There is no mass.      Tenderness: There is no abdominal tenderness. There is no guarding or rebound.      Hernia: A hernia is present.      Comments: Non tender ventral hernia.   Musculoskeletal:         General: No swelling or tenderness. Normal range of motion.      Cervical back: Normal range of motion and neck supple. No tenderness.       Comments: She is ambulatory with assistance of cane.    Feet:      Right foot:      Protective Sensation: 5 sites tested.  5 sites sensed.      Skin integrity: No ulcer or skin breakdown.      Left foot:      Protective Sensation: 5 sites tested.  5 sites sensed.      Skin integrity: No ulcer or skin breakdown.   Lymphadenopathy:      Cervical: No cervical adenopathy.   Skin:     General: Skin is warm and dry.   Neurological:      Mental Status: She is alert.      Motor: No abnormal muscle tone.   Psychiatric:         Mood and Affect: Mood normal.         Speech: Speech normal.         Behavior: Behavior normal.         Thought Content: Thought content normal.         Judgment: Judgment normal.         ASSESSMENT:  1. Normocytic anemia    2. Iron deficiency anemia due to chronic blood loss    3. Chronic deep vein thrombosis (DVT) of femoral vein of left lower extremity    4. Hypertension associated with diabetes    5. Nonrheumatic aortic valve stenosis    6. Diastolic congestive heart failure, unspecified HF chronicity    7. Obstructive hypertrophic cardiomyopathy    8. Stage 3b chronic kidney disease    9. Bilateral hearing loss, unspecified hearing loss type        PLAN:  Edie was seen today for follow-up and anemia.    Diagnoses and all orders for this visit:    Normocytic anemia - stable and managed by hematology.    Iron deficiency anemia due to chronic blood loss  - stable and managed by hematology.    Chronic deep vein thrombosis (DVT) of femoral vein of left lower extremity  - stable and managed by hematology.    Hypertension associated with diabetes  - stable.    Nonrheumatic aortic valve stenosis - stable and managed by cardiology.    Diastolic congestive heart failure, unspecified HF chronicity - stable and managed by cardiology.    Obstructive hypertrophic cardiomyopathy - stable and managed by cardiology.    Stage 3b chronic kidney disease with stable eGFR.    Bilateral hearing loss, unspecified  hearing loss type - stable and wears hearing aids.      NO labs today.  Continue current medications, follow low sodium, low cholesterol, low carb diet, daily walks.  Keep follow up with specialists (including advised patient to check with hematology to see if mucus in stool is effect of transfusion, and if not effect, then likely due to something she ate).  Follow up if symptoms worsen or fail to improve. But, keep 03/11/2024 appointment scheduled for hypertension and diabetes follow-up.

## 2024-01-16 PROBLEM — I62.03 NONTRAUMATIC CHRONIC SUBDURAL HEMORRHAGE: Status: ACTIVE | Noted: 2024-01-16

## 2024-01-16 PROBLEM — I62.03 NONTRAUMATIC CHRONIC SUBDURAL HEMORRHAGE: Status: RESOLVED | Noted: 2024-01-16 | Resolved: 2024-01-16

## 2024-01-16 PROBLEM — I50.30 DIASTOLIC CONGESTIVE HEART FAILURE, UNSPECIFIED HF CHRONICITY: Status: ACTIVE | Noted: 2024-01-16

## 2024-01-22 DIAGNOSIS — T78.3XXA ANGIOEDEMA, INITIAL ENCOUNTER: ICD-10-CM

## 2024-01-23 RX ORDER — MONTELUKAST SODIUM 10 MG/1
TABLET ORAL
Qty: 30 TABLET | Refills: 0 | Status: SHIPPED | OUTPATIENT
Start: 2024-01-23

## 2024-02-20 DIAGNOSIS — T78.3XXA ANGIOEDEMA, INITIAL ENCOUNTER: ICD-10-CM

## 2024-02-20 RX ORDER — MONTELUKAST SODIUM 10 MG/1
TABLET ORAL
Qty: 30 TABLET | Refills: 0 | OUTPATIENT
Start: 2024-02-20

## 2024-02-27 ENCOUNTER — TELEPHONE (OUTPATIENT)
Dept: FAMILY MEDICINE | Facility: CLINIC | Age: 89
End: 2024-02-27
Payer: MEDICARE

## 2024-02-27 ENCOUNTER — OFFICE VISIT (OUTPATIENT)
Dept: FAMILY MEDICINE | Facility: CLINIC | Age: 89
End: 2024-02-27
Attending: FAMILY MEDICINE
Payer: MEDICARE

## 2024-02-27 VITALS
WEIGHT: 125.69 LBS | RESPIRATION RATE: 18 BRPM | HEART RATE: 80 BPM | SYSTOLIC BLOOD PRESSURE: 130 MMHG | OXYGEN SATURATION: 95 % | BODY MASS INDEX: 25.34 KG/M2 | DIASTOLIC BLOOD PRESSURE: 62 MMHG | HEIGHT: 59 IN | TEMPERATURE: 99 F

## 2024-02-27 DIAGNOSIS — R05.9 COUGH, UNSPECIFIED TYPE: ICD-10-CM

## 2024-02-27 DIAGNOSIS — R09.89 RUNNY NOSE: ICD-10-CM

## 2024-02-27 PROCEDURE — 99999 PR PBB SHADOW E&M-EST. PATIENT-LVL V: CPT | Mod: PBBFAC,,, | Performed by: FAMILY MEDICINE

## 2024-02-27 PROCEDURE — 99213 OFFICE O/P EST LOW 20 MIN: CPT | Mod: S$GLB,,, | Performed by: FAMILY MEDICINE

## 2024-02-27 RX ORDER — HYDROCODONE POLISTIREX AND CHLORPHENIRAMINE POLISTIREX 10; 8 MG/5ML; MG/5ML
5 SUSPENSION, EXTENDED RELEASE ORAL EVERY 12 HOURS PRN
Qty: 100 ML | Refills: 0 | Status: SHIPPED | OUTPATIENT
Start: 2024-02-27 | End: 2024-03-08

## 2024-02-27 NOTE — TELEPHONE ENCOUNTER
Patient called in stating the pharmacy system is down and her Rx from today will need to be faxed over.

## 2024-02-27 NOTE — PROGRESS NOTES
Edie Hammond    Chief Complaint   Patient presents with    cough        History of Present Illness:   Ms. Hammond, a nonsmoker, comes in today with complaint of having a couple of weeks of cough-initially dry but now with mucus.  She states cough keeps her up at night.  She complains of having post-tussive nausea.  She states sniffles started yesterday.  Otherwise, she denies having other associated symptoms-indigestion, heartburn, other sinus symptoms; fever, chills, fatigue, appetite changes; shortness of breath, wheezing; chest pain, palpitations, leg swelling ; abdominal pain, vomiting, diarrhea, constipation; unusual urinary symptoms; polydipsia, polyphagia, polyuria, hot or cold intolerance; back pain; headache; anxiety, depression, homicidal or suicidal thoughts.      She states she has not been taking medication for symptoms but requests narcotic cough medication as she states it has helped in the past for her symptoms.    She denies exposure to known ill contacts.  She states she has received seasonal flu shot and COVID vaccine series plus boosters. She states her glucose was 104 this morning.      She states she drove herself today.        Current Outpatient Medications   Medication Sig    amLODIPine (NORVASC) 10 MG tablet Take 10 mg by mouth once daily.    blood sugar diagnostic (BLOOD GLUCOSE TEST) Strp Use twice daily prn. Dx: E11.9    blood-glucose meter kit Use as instructed - dx: E11.9    bumetanide (BUMEX) 1 MG tablet Take 1 mg by mouth once daily.    cetirizine (ZYRTEC) 10 MG tablet Take 10 mg by mouth once daily.     cloNIDine (CATAPRES) 0.1 MG tablet Take 0.1 mg by mouth 2 (two) times daily.    desoximetasone (TOPICORT) 0.25 % cream 1 application to affected area Externally Twice a day as needed for 14 days    dorzolamide-timolol 2-0.5% (COSOPT) 22.3-6.8 mg/mL ophthalmic solution 1 drop.    EPINEPHrine (EPIPEN) 0.3 mg/0.3 mL AtIn INJECT 0.3MLS INTO THE MUSCLE ONCE FOR 1 DOSE    famotidine  (PEPCID) 40 MG tablet Take 1 tablet (40 mg total) by mouth once daily.    hydrALAZINE (APRESOLINE) 100 MG tablet TAKE ONE TABLET BY MOUTH THREE TIMES DAILY    JANUVIA 100 mg Tab TAKE ONE TABLET BY MOUTH ONCE DAILY    lancets Misc Use twice daily prn. Dx: E11.9    levocetirizine (XYZAL) 5 MG tablet Take 1 tablet (5 mg total) by mouth every evening.    levothyroxine (SYNTHROID) 50 MCG tablet Take 1 tablet (50 mcg total) by mouth before breakfast.    LUMIGAN 0.01 % Drop Place into both eyes.    montelukast (SINGULAIR) 10 mg tablet TAKE ONE TABLET BY MOUTH IN THE EVENING    multivitamin with minerals tablet Take 1 tablet by mouth once daily.     nitroGLYCERIN (NITROSTAT) 0.4 MG SL tablet Place under the tongue.    nystatin (MYCOSTATIN) cream Apply topically 2 (two) times daily.         Review of Systems   Constitutional:  Negative for activity change, appetite change, chills, fatigue and fever.   HENT:  Positive for rhinorrhea. Negative for congestion, postnasal drip, sinus pressure, sinus pain, sneezing and sore throat.    Respiratory:  Positive for cough. Negative for shortness of breath and wheezing.    Cardiovascular:  Negative for chest pain, palpitations and leg swelling.   Gastrointestinal:  Negative for abdominal pain, constipation, diarrhea, nausea and vomiting.   Endocrine: Negative for cold intolerance, heat intolerance, polydipsia, polyphagia and polyuria.   Genitourinary:  Negative for difficulty urinating.   Musculoskeletal:  Negative for back pain.   Neurological:  Negative for numbness and headaches.   Psychiatric/Behavioral:  Negative for dysphoric mood and suicidal ideas. The patient is not nervous/anxious.         Negative for homicidal ideas.       Objective:  Physical Exam  Vitals and nursing note reviewed.   Constitutional:       General: She is not in acute distress.     Appearance: Normal appearance. She is well-developed. She is not ill-appearing, toxic-appearing or diaphoretic.   HENT:       Head: Normocephalic and atraumatic.      Right Ear: Tympanic membrane, ear canal and external ear normal. There is no impacted cerumen.      Left Ear: Tympanic membrane, ear canal and external ear normal. There is no impacted cerumen.      Ears:      Comments: She wears hearing aids.     Nose: Nose normal. No congestion or rhinorrhea.      Mouth/Throat:      Mouth: Mucous membranes are moist.      Pharynx: Oropharynx is clear. No oropharyngeal exudate or posterior oropharyngeal erythema.   Eyes:      General:         Right eye: No discharge.         Left eye: No discharge.      Extraocular Movements: Extraocular movements intact.      Conjunctiva/sclera: Conjunctivae normal.      Pupils: Pupils are equal, round, and reactive to light.   Cardiovascular:      Rate and Rhythm: Normal rate and regular rhythm.      Heart sounds: Murmur heard.      No gallop.   Pulmonary:      Effort: Pulmonary effort is normal.      Breath sounds: Normal breath sounds.   Abdominal:      General: Bowel sounds are normal. There is no distension.      Palpations: Abdomen is soft. There is no mass.      Tenderness: There is no abdominal tenderness. There is no guarding or rebound.   Musculoskeletal:         General: No swelling or tenderness. Normal range of motion.      Cervical back: Normal range of motion and neck supple. No tenderness.      Comments: She is ambulatory with assistance of cane.    Lymphadenopathy:      Cervical: No cervical adenopathy.   Skin:     General: Skin is warm and dry.   Neurological:      Mental Status: She is alert.      Motor: No abnormal muscle tone.   Psychiatric:         Mood and Affect: Mood normal.         Speech: Speech normal.         Behavior: Behavior normal.         Thought Content: Thought content normal.         Judgment: Judgment normal.         ASSESSMENT:  1. Cough, unspecified type    2. Runny nose        PLAN:  Edie was seen today for cough .    Diagnoses and all orders for this  visit:    Cough, unspecified type  -     hydrocodone-chlorpheniramine (TUSSIONEX) 10-8 mg/5 mL suspension; Take 5 mLs by mouth every 12 (twelve) hours as needed for Cough.    Runny nose  -     hydrocodone-chlorpheniramine (TUSSIONEX) 10-8 mg/5 mL suspension; Take 5 mLs by mouth every 12 (twelve) hours as needed for Cough.        Continue current medications, follow low sodium, low cholesterol, low carb diet, daily walks.  Tussionex-5 ml every 12 hours prn cough; medication precautions discussed with patient.  Follow up if symptoms worsen or fail to improve.

## 2024-02-27 NOTE — TELEPHONE ENCOUNTER
----- Message from Bella Delgadilol sent at 2/27/2024  7:56 AM CST -----  Edie Hammond calling regarding Same Day Appointment for having a recurring bad cough and is asking to be seen today if possible, please call back tp PT to inform,  [phone]

## 2024-02-27 NOTE — TELEPHONE ENCOUNTER
----- Message from Arlene Stout sent at 2024  3:00 PM CST -----  Regardin  Contact: dunia Irizarry is calling regarding the pharmacy system being down and needing the prescription faxed over.  Please give her a call back at [phone]

## 2024-02-27 NOTE — TELEPHONE ENCOUNTER
Patient call returned. She stated that she wanted to be seen on today for 2 pm with Dr. Gary for a cough. Patient appointment scheduled, she verbally understood the appt information given.

## 2024-02-27 NOTE — TELEPHONE ENCOUNTER
Patient was given Rx for Tussionex in her hand to take to pharmacy as we could not send electronically or fax it.  Please call pharmacy and patient to clarify why Rx on appropriate script patient is not being accepted.

## 2024-02-28 NOTE — TELEPHONE ENCOUNTER
Do PA but let patient know PA process will hold her up on taking medication for cough.  Would she like me to send something else and for cough?

## 2024-02-29 NOTE — TELEPHONE ENCOUNTER
Called patient to inform her of hold for PA and asked would she like something else to be sent in. Patient stated she was able to  cough syrup medication from pharmacy on yesterday and said thanks for everything.

## 2024-03-04 NOTE — TELEPHONE ENCOUNTER
[facility] Reference Number: [id number]. HYD TITA/CPM LIZ 10-8/5ML is denied for not meeting the prior authorization requirement(s).

## 2024-03-11 ENCOUNTER — LAB VISIT (OUTPATIENT)
Dept: LAB | Facility: HOSPITAL | Age: 89
End: 2024-03-11
Attending: FAMILY MEDICINE
Payer: MEDICARE

## 2024-03-11 ENCOUNTER — OFFICE VISIT (OUTPATIENT)
Dept: FAMILY MEDICINE | Facility: CLINIC | Age: 89
End: 2024-03-11
Attending: FAMILY MEDICINE
Payer: MEDICARE

## 2024-03-11 VITALS
SYSTOLIC BLOOD PRESSURE: 142 MMHG | DIASTOLIC BLOOD PRESSURE: 82 MMHG | HEIGHT: 59 IN | WEIGHT: 126.13 LBS | HEART RATE: 71 BPM | OXYGEN SATURATION: 98 % | TEMPERATURE: 97 F | BODY MASS INDEX: 25.43 KG/M2

## 2024-03-11 DIAGNOSIS — E11.9 TYPE 2 DIABETES MELLITUS WITHOUT COMPLICATION, WITHOUT LONG-TERM CURRENT USE OF INSULIN: ICD-10-CM

## 2024-03-11 DIAGNOSIS — R05.3 CHRONIC COUGH: ICD-10-CM

## 2024-03-11 DIAGNOSIS — E66.3 OVERWEIGHT (BMI 25.0-29.9): ICD-10-CM

## 2024-03-11 DIAGNOSIS — N18.31 STAGE 3A CHRONIC KIDNEY DISEASE: ICD-10-CM

## 2024-03-11 DIAGNOSIS — E11.59 HYPERTENSION ASSOCIATED WITH DIABETES: ICD-10-CM

## 2024-03-11 DIAGNOSIS — I15.2 HYPERTENSION ASSOCIATED WITH DIABETES: ICD-10-CM

## 2024-03-11 LAB
ANION GAP SERPL CALC-SCNC: 12 MMOL/L (ref 8–16)
BUN SERPL-MCNC: 22 MG/DL (ref 10–30)
CALCIUM SERPL-MCNC: 9.8 MG/DL (ref 8.7–10.5)
CHLORIDE SERPL-SCNC: 97 MMOL/L (ref 95–110)
CO2 SERPL-SCNC: 26 MMOL/L (ref 23–29)
CREAT SERPL-MCNC: 1.3 MG/DL (ref 0.5–1.4)
EST. GFR  (NO RACE VARIABLE): 38.3 ML/MIN/1.73 M^2
ESTIMATED AVG GLUCOSE: 103 MG/DL (ref 68–131)
GLUCOSE SERPL-MCNC: 99 MG/DL (ref 70–110)
HBA1C MFR BLD: 5.2 % (ref 4–5.6)
POTASSIUM SERPL-SCNC: 3.7 MMOL/L (ref 3.5–5.1)
SODIUM SERPL-SCNC: 135 MMOL/L (ref 136–145)

## 2024-03-11 PROCEDURE — 83036 HEMOGLOBIN GLYCOSYLATED A1C: CPT | Performed by: FAMILY MEDICINE

## 2024-03-11 PROCEDURE — 80048 BASIC METABOLIC PNL TOTAL CA: CPT | Performed by: FAMILY MEDICINE

## 2024-03-11 PROCEDURE — 99999 PR PBB SHADOW E&M-EST. PATIENT-LVL V: CPT | Mod: PBBFAC,,, | Performed by: FAMILY MEDICINE

## 2024-03-11 PROCEDURE — 99214 OFFICE O/P EST MOD 30 MIN: CPT | Mod: S$GLB,,, | Performed by: FAMILY MEDICINE

## 2024-03-11 PROCEDURE — 36415 COLL VENOUS BLD VENIPUNCTURE: CPT | Mod: PO | Performed by: FAMILY MEDICINE

## 2024-03-11 NOTE — PROGRESS NOTES
Edie Hammond    Chief Complaint   Patient presents with    Hypertension    Diabetes    Follow-up       History of Present Illness:   Ms. Hammond comes in today for hypertension and diabetes follow-up.  She states she is not fasting but has taken medications today.  She states she monitors her diet and walks 7 times a week in her [facility] [place].  She states she performs home blood pressure checks daily to every other day with levels ranging 140/60s and performs home glucose checks fasting daily with levels ranging 's (101 this morning).    She complains of having night sweats at her right side for 1-2 weeks and mainly at 3-4 AM in the mornings at which time she states she does not check her sugars.  She states she continues to have dry cough and gag with slimy mucus from her stomach.    She states she saw Hematology last week with H and H of 10.5/30.    Otherwise, she denies having fever, chills, fatigue, appetite changes; shortness of breath, wheezing; chest pain, palpitations, leg swelling; abdominal pain, nausea, vomiting, diarrhea, constipation; unusual urinary symptoms; polydipsia, polyphagia, polyuria, cold intolerance; acute visual changes, numbness, headache; anxiety, depression, homicidal or suicidal thoughts.      She states she saw Dr. Lemus, cardiologist, on January 10, 2024 for surveillance of personal history of other venous thrombosis and embolism, non-rheumatic aortic (valve) stenosis, hypertensive heart disease without heart failure, obstructive hypertrophic cardiomyopathy, cardiac murmur, unspecified, nontraumatic chronic subdural hemorrhage with follow up scheduled for June 2024.    She states she saw Dr. 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        Labs:                      WBC                      12.89 (H)           11/15/2023                 HGB                      8.7 (L)             11/15/2023                 HCT                      25.9 (L)            11/15/2023                 PLT                      424                 11/15/2023                 CHOL                     193                 10/09/2023                 TRIG                     133                 10/09/2023                 HDL                      57                  10/09/2023                 ALT                      9 (L)               10/09/2023                 AST                      23                  10/09/2023                 NA                       136                 10/09/2023                 K                        3.4 (L)             10/09/2023                 CL                       98                  10/09/2023                 CREATININE               1.0                 10/09/2023                 BUN                      21                  10/09/2023                 CO2                      26                  10/09/2023                 TSH                      3.428               10/09/2023                 HGBA1C                   6.1 (H)             10/09/2023           LDLCALC                  109.4               10/09/2023                  Current Outpatient Medications   Medication Sig    amLODIPine (NORVASC) 10 MG tablet Take 10 mg by mouth once daily.    blood sugar diagnostic (BLOOD GLUCOSE TEST) Strp Use twice daily prn. Dx: E11.9    blood-glucose meter kit Use as instructed - dx: E11.9    bumetanide (BUMEX) 1 MG tablet Take 1 mg by mouth once daily.    cetirizine (ZYRTEC) 10 MG tablet Take 10 mg by mouth once daily.     cloNIDine (CATAPRES) 0.1 MG tablet Take 0.1 mg by mouth 2 (two) times daily.    desoximetasone (TOPICORT) 0.25 % cream 1 application to affected area Externally Twice a day as needed for 14 days    dorzolamide-timolol 2-0.5%  (COSOPT) 22.3-6.8 mg/mL ophthalmic solution 1 drop.    EPINEPHrine (EPIPEN) 0.3 mg/0.3 mL AtIn INJECT 0.3MLS INTO THE MUSCLE ONCE FOR 1 DOSE    famotidine (PEPCID) 40 MG tablet Take 1 tablet (40 mg total) by mouth once daily.    hydrALAZINE (APRESOLINE) 100 MG tablet TAKE ONE TABLET BY MOUTH THREE TIMES DAILY    JANUVIA 100 mg Tab TAKE ONE TABLET BY MOUTH ONCE DAILY    lancets Misc Use twice daily prn. Dx: E11.9    levocetirizine (XYZAL) 5 MG tablet Take 1 tablet (5 mg total) by mouth every evening.    levothyroxine (SYNTHROID) 50 MCG tablet Take 1 tablet (50 mcg total) by mouth before breakfast.    LUMIGAN 0.01 % Drop Place into both eyes.    montelukast (SINGULAIR) 10 mg tablet TAKE ONE TABLET BY MOUTH IN THE EVENING    multivitamin with minerals tablet Take 1 tablet by mouth once daily.     nitroGLYCERIN (NITROSTAT) 0.4 MG SL tablet Place under the tongue.    nystatin (MYCOSTATIN) cream Apply topically 2 (two) times daily.     No current facility-administered medications for this visit.         Review of Systems   Constitutional:  Negative for activity change, appetite change, chills, fatigue and fever.        Weight 57 kg (125 lb 10.6 oz) at February 27, 2024 visit.   HENT:  Negative for congestion.    Eyes:  Negative for visual disturbance.        See history of present illness.   Respiratory:  Positive for cough. Negative for shortness of breath and wheezing.         See history of present illness.   Cardiovascular:  Negative for chest pain, palpitations and leg swelling.        See history of present illness.   Gastrointestinal:  Negative for abdominal pain, constipation, diarrhea, nausea and vomiting.   Endocrine: Positive for heat intolerance. Negative for cold intolerance, polydipsia, polyphagia and polyuria.        See history of present illness.   Genitourinary:  Negative for difficulty urinating.   Musculoskeletal:  Positive for arthralgias and back pain.        See history of present illness.      Allergic/Immunologic:        See history of present illness.   Neurological:  Negative for weakness and headaches.   Psychiatric/Behavioral:  Negative for confusion, dysphoric mood, sleep disturbance and suicidal ideas. The patient is not nervous/anxious.         Negative for homicidal ideas. See history of present illness.       Objective:  Physical Exam  Vitals and nursing note reviewed.   Constitutional:       General: She is not in acute distress.     Appearance: Normal appearance. She is well-developed. She is not ill-appearing, toxic-appearing or diaphoretic.   HENT:      Ears:      Comments: Bilateral hearing aids.  Cardiovascular:      Rate and Rhythm: Normal rate and regular rhythm.      Pulses:           Dorsalis pedis pulses are 3+ on the right side and 3+ on the left side.      Heart sounds: Murmur heard.      No gallop.   Pulmonary:      Effort: Pulmonary effort is normal.      Breath sounds: Normal breath sounds.   Abdominal:      General: Bowel sounds are normal. There is no distension.      Palpations: Abdomen is soft. There is no mass.      Tenderness: There is no abdominal tenderness. There is no guarding or rebound.      Hernia: A hernia is present.      Comments: Non tender ventral hernia.   Musculoskeletal:         General: No swelling or tenderness. Normal range of motion.      Cervical back: Normal range of motion and neck supple. No tenderness.      Comments: She is ambulatory with assistance of cane.    Feet:      Right foot:      Protective Sensation: 5 sites tested.  5 sites sensed.      Skin integrity: No ulcer or skin breakdown.      Left foot:      Protective Sensation: 5 sites tested.  5 sites sensed.      Skin integrity: No ulcer or skin breakdown.   Lymphadenopathy:      Cervical: No cervical adenopathy.   Skin:     General: Skin is warm and dry.   Neurological:      Mental Status: She is alert.      Motor: No abnormal muscle tone.   Psychiatric:         Mood and Affect: Mood  normal.         Speech: Speech normal.         Behavior: Behavior normal.         Thought Content: Thought content normal.         Judgment: Judgment normal.         ASSESSMENT:  1. Hypertension associated with diabetes    2. Type 2 diabetes mellitus without complication, without long-term current use of insulin    3. Stage 3a chronic kidney disease    4. Chronic cough    5. Overweight (BMI 25.0-29.9)        PLAN:  Edie was seen today for hypertension, diabetes and follow-up.    Diagnoses and all orders for this visit:    Hypertension associated with diabetes    Type 2 diabetes mellitus without complication, without long-term current use of insulin  -     Hemoglobin A1C; Future    Stage 3a chronic kidney disease  -     Basic Metabolic Panel; Future    Chronic cough    Overweight (BMI 25.0-29.9)      Patient advised to call for results.  Continue current medications, follow low sodium, low cholesterol, low carb diet, daily walks.  Advised patient to check glucose when sweats occur as glucose may be dropping.  Try OTC Robitussin-Dm for cough for 2 to 3 weeks; if not better, can add PPI daily prn.  Keep follow up with specialists.  Follow up in about 7 months (around 10/10/2024) for physical.

## 2024-03-28 PROBLEM — N18.31 STAGE 3A CHRONIC KIDNEY DISEASE: Status: ACTIVE | Noted: 2024-03-28

## 2024-05-20 ENCOUNTER — PATIENT MESSAGE (OUTPATIENT)
Dept: ADMINISTRATIVE | Facility: HOSPITAL | Age: 89
End: 2024-05-20
Payer: MEDICARE

## 2024-05-21 ENCOUNTER — PATIENT OUTREACH (OUTPATIENT)
Dept: ADMINISTRATIVE | Facility: HOSPITAL | Age: 89
End: 2024-05-21
Payer: MEDICARE

## 2024-05-21 DIAGNOSIS — E11.9 TYPE 2 DIABETES MELLITUS WITHOUT COMPLICATION, WITHOUT LONG-TERM CURRENT USE OF INSULIN: Primary | ICD-10-CM

## 2024-06-11 ENCOUNTER — LAB VISIT (OUTPATIENT)
Dept: LAB | Facility: HOSPITAL | Age: 89
End: 2024-06-11
Attending: FAMILY MEDICINE
Payer: MEDICARE

## 2024-06-11 DIAGNOSIS — E11.9 TYPE 2 DIABETES MELLITUS WITHOUT COMPLICATION, WITHOUT LONG-TERM CURRENT USE OF INSULIN: ICD-10-CM

## 2024-06-11 LAB
ALBUMIN/CREAT UR: 7104.5 UG/MG (ref 0–30)
CREAT UR-MCNC: 22 MG/DL (ref 15–325)
MICROALBUMIN UR DL<=1MG/L-MCNC: 1563 UG/ML

## 2024-06-11 PROCEDURE — 82570 ASSAY OF URINE CREATININE: CPT | Performed by: FAMILY MEDICINE

## 2024-06-12 NOTE — PROGRESS NOTES
Replying to Campaign Questionnaire for Overdue [facility]: Micro Urine     Pt scheduled Eileen 10  
Satisfied in      
4

## 2024-06-21 DIAGNOSIS — E11.9 TYPE 2 DIABETES MELLITUS WITHOUT COMPLICATION, WITHOUT LONG-TERM CURRENT USE OF INSULIN: ICD-10-CM

## 2024-06-22 NOTE — TELEPHONE ENCOUNTER
Care Due:                  Date            Visit Type   Department     Provider  --------------------------------------------------------------------------------                                EP -                              PRIMARY      [facility]  Last Visit: 03-      CARE ([facility])   MEDICINE       Erin Gary  Next Visit: None Scheduled  None         None Found                                                            Last  Test          Frequency    Reason                     Performed    Due Date  --------------------------------------------------------------------------------    HBA1C.......  6 months..Musa ANDERSON..................  03- 09-    [facility] Embedded Care Due Messages. Reference number: [id number].   6/21/2024 7:17:55 PM CDT

## 2024-06-22 NOTE — TELEPHONE ENCOUNTER
Refill Routing Note   Medication(s) are not appropriate for processing by [facility] for the following reason(s):        Required labs abnormal    [facility] action(s):  Defer   Requires labs : Yes             Appointments  past 12m or future 3m with PCP    Date Provider   Last Visit   3/11/2024 Erin Gary MD   Next Visit   10/10/2024 Erin Gary MD   ED visits in past 90 days: 0        Note composed:10:51 PM 06/21/2024

## 2024-06-23 RX ORDER — SITAGLIPTIN 100 MG/1
TABLET, FILM COATED ORAL
Qty: 90 TABLET | Refills: 0 | Status: SHIPPED | OUTPATIENT
Start: 2024-06-23

## 2024-08-13 DIAGNOSIS — B37.31 VAGINAL CANDIDIASIS: ICD-10-CM

## 2024-08-14 ENCOUNTER — TELEPHONE (OUTPATIENT)
Dept: FAMILY MEDICINE | Facility: CLINIC | Age: 89
End: 2024-08-14
Payer: MEDICARE

## 2024-08-14 RX ORDER — NYSTATIN 100000 U/G
CREAM TOPICAL 2 TIMES DAILY
Qty: 30 G | Refills: 0 | Status: SHIPPED | OUTPATIENT
Start: 2024-08-14

## 2024-08-14 NOTE — TELEPHONE ENCOUNTER
Refill Routing Note   Medication(s) are not appropriate for processing by [facility] for the following reason(s):        Outside of protocol: non-delegated    [facility] action(s):  Route      Medication Therapy Plan:         Appointments  past 12m or future 3m with PCP    Date Provider   Last Visit   3/11/2024 Erin Gary MD   Next Visit   10/10/2024 Erin Gary MD   ED visits in past 90 days: 0        Note composed:11:47 AM 08/14/2024

## 2024-08-14 NOTE — TELEPHONE ENCOUNTER
Advised pt regarding Nystatin cream. She says thank you. Pt states she does not have a kidney doctor, but she would like to get recommendations from you before choosing one.

## 2024-08-14 NOTE — TELEPHONE ENCOUNTER
Please advise patient I gave okay for Nystatin cream as requested today.      Please advise pt I know she follows with blood specialist anemia due to decreased kidney function but ask patient if she has seen kidney doctor in the past for decreased kidney function and protein in urine. If not, I would like her also to see kidney doctor. Let me know. Thanks.

## 2024-08-19 ENCOUNTER — TELEPHONE (OUTPATIENT)
Dept: NEPHROLOGY | Facility: CLINIC | Age: 89
End: 2024-08-19
Payer: MEDICARE

## 2024-08-19 DIAGNOSIS — N28.9 RENAL INSUFFICIENCY: Primary | ICD-10-CM

## 2024-08-27 ENCOUNTER — TELEPHONE (OUTPATIENT)
Dept: FAMILY MEDICINE | Facility: CLINIC | Age: 89
End: 2024-08-27
Payer: MEDICARE

## 2024-08-27 DIAGNOSIS — U07.1 COVID-19: Primary | ICD-10-CM

## 2024-08-27 DIAGNOSIS — R05.9 COUGH, UNSPECIFIED TYPE: ICD-10-CM

## 2024-08-27 RX ORDER — BENZONATATE 100 MG/1
100 CAPSULE ORAL 3 TIMES DAILY PRN
Qty: 30 CAPSULE | Refills: 0 | Status: SHIPPED | OUTPATIENT
Start: 2024-08-27 | End: 2024-09-06

## 2024-08-27 RX ORDER — NIRMATRELVIR AND RITONAVIR 150-100 MG
KIT ORAL
Qty: 20 TABLET | Refills: 0 | Status: SHIPPED | OUTPATIENT
Start: 2024-08-27

## 2024-08-27 NOTE — TELEPHONE ENCOUNTER
Pt called in stating that she tested positive for Covid yesterday and would like to know if she needs to take medications for it. Pt states she is having cold symptoms at this time. Please advise.

## 2024-08-27 NOTE — TELEPHONE ENCOUNTER
I called patient - tested positive for Covid yesterday - w/sniffles, sneezing, fatigue, dry cough - not taking medications for symptoms except Tylenol for headache and takes Zyrtec. 6  Covid risk score.  I recommended she take Paxlovid twice daily for 5 days (renal dose) but take Amlodipine 10 mg - 1/2 pill daily while taking Paxlovid for safe medication precautions. I also recommended Tessalon Perles as directed for cough.  Patient verbalized understanding of the above instructions.    I have put the following orders and/or medications to this note.     No orders of the defined types were placed in this encounter.      Medications Ordered This Encounter   Medications    benzonatate (TESSALON) 100 MG capsule     Sig: Take 1 capsule (100 mg total) by mouth 3 (three) times daily as needed for Cough.     Dispense:  30 capsule     Refill:  0    nirmatrelvir-ritonavir (PAXLOVID) 150-100 mg DsPk     Sig: Take 2 pills by mouth twice daily for 5 days     Dispense:  20 tablet     Refill:  0

## 2024-08-30 ENCOUNTER — TELEPHONE (OUTPATIENT)
Dept: NEPHROLOGY | Facility: CLINIC | Age: 89
End: 2024-08-30
Payer: MEDICARE

## 2024-08-30 DIAGNOSIS — N28.9 RENAL INSUFFICIENCY: Primary | ICD-10-CM

## 2024-09-03 ENCOUNTER — LAB VISIT (OUTPATIENT)
Dept: LAB | Facility: HOSPITAL | Age: 89
End: 2024-09-03
Attending: INTERNAL MEDICINE
Payer: MEDICARE

## 2024-09-03 ENCOUNTER — OFFICE VISIT (OUTPATIENT)
Dept: NEPHROLOGY | Facility: CLINIC | Age: 89
End: 2024-09-03
Payer: MEDICARE

## 2024-09-03 VITALS
DIASTOLIC BLOOD PRESSURE: 62 MMHG | HEART RATE: 68 BPM | BODY MASS INDEX: 25.78 KG/M2 | WEIGHT: 127.88 LBS | HEIGHT: 59 IN | SYSTOLIC BLOOD PRESSURE: 160 MMHG

## 2024-09-03 DIAGNOSIS — N18.32 STAGE 3B CHRONIC KIDNEY DISEASE: Primary | ICD-10-CM

## 2024-09-03 DIAGNOSIS — R80.1 PERSISTENT PROTEINURIA: ICD-10-CM

## 2024-09-03 DIAGNOSIS — N28.9 RENAL INSUFFICIENCY: ICD-10-CM

## 2024-09-03 DIAGNOSIS — I10 PRIMARY HYPERTENSION: ICD-10-CM

## 2024-09-03 LAB
BACTERIA #/AREA URNS HPF: ABNORMAL /HPF
BILIRUB UR QL STRIP: NEGATIVE
CLARITY UR: CLEAR
COLOR UR: YELLOW
CREAT UR-MCNC: 30 MG/DL (ref 15–325)
GLUCOSE UR QL STRIP: NEGATIVE
HGB UR QL STRIP: ABNORMAL
HYALINE CASTS #/AREA URNS LPF: 0 /LPF
KETONES UR QL STRIP: NEGATIVE
LEUKOCYTE ESTERASE UR QL STRIP: NEGATIVE
MICROSCOPIC COMMENT: ABNORMAL
NITRITE UR QL STRIP: NEGATIVE
PH UR STRIP: 7 [PH] (ref 5–8)
PROT UR QL STRIP: ABNORMAL
PROT UR-MCNC: 263 MG/DL (ref 0–15)
PROT/CREAT UR: 8.77 MG/G{CREAT} (ref 0–0.2)
RBC #/AREA URNS HPF: 15 /HPF (ref 0–4)
SP GR UR STRIP: 1.01 (ref 1–1.03)
URN SPEC COLLECT METH UR: ABNORMAL
WBC #/AREA URNS HPF: 1 /HPF (ref 0–5)

## 2024-09-03 PROCEDURE — 99204 OFFICE O/P NEW MOD 45 MIN: CPT | Mod: S$GLB,,, | Performed by: INTERNAL MEDICINE

## 2024-09-03 PROCEDURE — 1160F RVW MEDS BY RX/DR IN RCRD: CPT | Mod: CPTII,S$GLB,, | Performed by: INTERNAL MEDICINE

## 2024-09-03 PROCEDURE — 84156 ASSAY OF PROTEIN URINE: CPT | Performed by: INTERNAL MEDICINE

## 2024-09-03 PROCEDURE — 3288F FALL RISK ASSESSMENT DOCD: CPT | Mod: CPTII,S$GLB,, | Performed by: INTERNAL MEDICINE

## 2024-09-03 PROCEDURE — 81000 URINALYSIS NONAUTO W/SCOPE: CPT | Performed by: INTERNAL MEDICINE

## 2024-09-03 PROCEDURE — 99999 PR PBB SHADOW E&M-EST. PATIENT-LVL IV: CPT | Mod: PBBFAC,,, | Performed by: INTERNAL MEDICINE

## 2024-09-03 PROCEDURE — 82570 ASSAY OF URINE CREATININE: CPT | Performed by: INTERNAL MEDICINE

## 2024-09-03 PROCEDURE — 1126F AMNT PAIN NOTED NONE PRSNT: CPT | Mod: CPTII,S$GLB,, | Performed by: INTERNAL MEDICINE

## 2024-09-03 PROCEDURE — 1101F PT FALLS ASSESS-DOCD LE1/YR: CPT | Mod: CPTII,S$GLB,, | Performed by: INTERNAL MEDICINE

## 2024-09-03 PROCEDURE — 1159F MED LIST DOCD IN RCRD: CPT | Mod: CPTII,S$GLB,, | Performed by: INTERNAL MEDICINE

## 2024-09-03 NOTE — PROGRESS NOTES
Edie Hammond is a 89 y.o. female    HPI:    She was noted to have decreased EGFR on routine laboratory studies.  Nephrology has been consulted for evaluation.  In the clinic today she is doing well and has no specific complaints.  Her laboratory studies medications were reviewed.  All Nephrology related questions were answered to her satisfaction.  Creatinine has ranged between 0.9 and 1.3 for the past several years.  She has had persistent microalbuminuria for at least 2 years.    PAST MEDICAL HISTORY:  She  has a past medical history of Diabetes, Glaucoma, Hand arthritis, Heart murmur, HTN (hypertension), Hypothyroidism, Intracranial hemorrhage, Postmenopausal, Seasonal allergies, and Type 2 diabetes mellitus.    PAST SURGICAL HISTORY:  She  has a past surgical history that includes Total abdominal hysterectomy w/ bilateral salpingoophorectomy; Myomectomy; Cholecystectomy; bladder lift; Craniotomy (2017); Breast biopsy; Foot neuroma surgery (Right, 1982); Bunionectomy (Bilateral); and Cataract extraction (Bilateral).    SOCIAL HISTORY:  She  reports that she has never smoked. She has never used smokeless tobacco. She reports that she does not currently use alcohol. She reports that she does not use drugs.      FAMILY MEDICAL HISTORY:  Her family history includes Breast cancer in her maternal aunt; Diabetes in her sister; Heart attack in her mother; Hypertension in her sister and sister; No Known Problems in her son.    Review of patient's allergies indicates:   Allergen Reactions    Brimonidine tartrate Itching and Swelling    Losartan Anaphylaxis and Swelling    Amlodipine besylate Edema and Other (See Comments)     Other reaction(s): swelling of feet    Metformin hcl Diarrhea           Prior to Admission medications    Medication Sig Start Date End Date Taking? Authorizing Provider   amLODIPine (NORVASC) 10 MG tablet Take 10 mg by mouth once daily. 7/25/22  Yes Provider, Historical   benzonatate  (TESSALON) 100 MG capsule Take 1 capsule (100 mg total) by mouth 3 (three) times daily as needed for Cough. 8/27/24 9/6/24 Yes Erin Gary MD   blood sugar diagnostic (BLOOD GLUCOSE TEST) Strp Use twice daily prn. Dx: E11.9 2/18/21  Yes Erin Gary MD   blood-glucose meter kit Use as instructed - dx: E11.9 2/18/21 9/3/24 Yes Erin Gary MD   bumetanide (BUMEX) 1 MG tablet Take 1 mg by mouth once daily. 4/8/17  Yes Provider, Historical   cetirizine (ZYRTEC) 10 MG tablet Take 10 mg by mouth once daily.    Yes Provider, Historical   cloNIDine (CATAPRES) 0.1 MG tablet Take 0.1 mg by mouth 2 (two) times daily.   Yes Provider, Historical   desoximetasone (TOPICORT) 0.25 % cream 1 application to affected area Externally Twice a day as needed for 14 days   Yes Provider, Historical   dorzolamide-timolol 2-0.5% (COSOPT) 22.3-6.8 mg/mL ophthalmic solution 1 drop.   Yes Provider, Historical   EPINEPHrine (EPIPEN) 0.3 mg/0.3 mL AtIn INJECT 0.3MLS INTO THE MUSCLE ONCE FOR 1 DOSE 6/21/22  Yes Jacqueline Darnell MD   famotidine (PEPCID) 40 MG tablet Take 1 tablet (40 mg total) by mouth once daily. 2/14/23 9/3/24 Yes Jacqueline Darnell MD   hydrALAZINE (APRESOLINE) 100 MG tablet TAKE ONE TABLET BY MOUTH THREE TIMES DAILY 7/4/23  Yes Erin Gary MD   JANUVIA 100 mg Tab TAKE ONE TABLET BY MOUTH ONCE DAILY 6/23/24  Yes Erin Gary MD   lancets Misc Use twice daily prn. Dx: E11.9 1/7/20  Yes Erin Gary MD   levocetirizine (XYZAL) 5 MG tablet Take 1 tablet (5 mg total) by mouth every evening. 10/31/23 10/30/24 Yes Griselda Rosado, NP   levothyroxine (SYNTHROID) 50 MCG tablet Take 1 tablet (50 mcg total) by mouth before breakfast. 10/9/23  Yes Erin Gary MD   LUMIGAN 0.01 % Drop Place into both eyes. 4/29/23  Yes Provider, Historical   montelukast (SINGULAIR) 10 mg tablet TAKE ONE TABLET BY MOUTH IN THE EVENING 1/23/24  Yes Jacqueline Darnell MD   multivitamin with minerals tablet Take 1 tablet by  "mouth once daily.    Yes Provider, Historical   nitroGLYCERIN (NITROSTAT) 0.4 MG SL tablet Place under the tongue. 7/24/22  Yes Provider, Historical   nystatin (MYCOSTATIN) cream Apply topically 2 (two) times daily. 8/14/24  Yes Erin Gary MD   nirmatrelvir-ritonavir (PAXLOVID) 150-100 mg DsPk Take 2 pills by mouth twice daily for 5 days  Patient not taking: Reported on 9/3/2024 8/27/24 9/3/24  Erin Gary MD        REVIEW OF SYSTEMS:  Patient has no fever, fatigue, visual changes, chest pain, edema, cough, dyspnea, nausea, vomiting, constipation, diarrhea, arthralgias, pruritis, dizziness, weakness, depression, confusion.        PHYSICAL EXAM:   height is 4' 11" (1.499 m) and weight is 58 kg (127 lb 13.9 oz). Her blood pressure is 160/62 (abnormal) and her pulse is 68.   Gen: WDWN female in no apparent distress  Psych: Normal mood and affect  Skin: No rashes or ulcers  Eyes: Normal conjunctiva and lids, PERRLA  ENT: Normal hearing with no oropharyngeal lesions  Neck: No JVD  Ext: No cyanosis, clubbing or edema          IMPRESSION AND RECOMMENDATIONS:    1. CKD 3:  Creatinine has ranged between 0.9 and 1.3 for the past several years.  This is normal.  EGFR is appropriate for her age.  She has had persistent microalbuminuria.  No PC ratio has been checked to see if she has overt albuminuria.  Will plan to check a UA and PC ratio prior to her next office visit.    2. Hypertension: Blood pressure was mildly elevated in clinic today.  Will defer to her primary care physician for management.    3. Microalbuminuria: She has had evidence of microalbuminuria for at least 2 years.  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  She is not overtly diabetic.  She has prediabetes.        "

## 2024-09-13 DIAGNOSIS — E11.9 TYPE 2 DIABETES MELLITUS WITHOUT COMPLICATION, WITHOUT LONG-TERM CURRENT USE OF INSULIN: ICD-10-CM

## 2024-09-14 NOTE — TELEPHONE ENCOUNTER
Care Due:                  Date            Visit Type   Department     Provider  --------------------------------------------------------------------------------                                EP -                              PRIMARY      [facility]  Last Visit: 03-      CARE ([facility])   MEDICINE       Erin Gary                              EP -                              PRIMARY      [facility]  Next Visit: 10-      CARE ([facility])   MEDICINE       Eirn Gary                                                            Last  Test          Frequency    Reason                     Performed    Due Date  --------------------------------------------------------------------------------    CBC.........  12 months..  hydrALAZINE..............  11-   11-    HBA1C.......  6 months...  JANUVIA..................  03- 09-    TSH.........  12 months..  levothyroxine............  10-   10-    [facility] Embedded Care Due Messages. Reference number: [id number].   9/13/2024 9:25:11 PM CDT

## 2024-09-15 NOTE — TELEPHONE ENCOUNTER
Refill Routing Note   Medication(s) are not appropriate for processing by [facility] for the following reason(s):        Required labs outdated    [facility] action(s):  Defer     Requires labs : Yes             Appointments  past 12m or future 3m with PCP    Date Provider   Last Visit   Visit date not found Erin Gary MD   Next Visit   10/10/2024 Erin Gary MD   ED visits in past 90 days: 0        Note composed:6:47 AM 09/15/2024

## 2024-09-16 RX ORDER — SITAGLIPTIN 100 MG/1
TABLET, FILM COATED ORAL
Qty: 90 TABLET | Refills: 0 | Status: SHIPPED | OUTPATIENT
Start: 2024-09-16

## 2024-09-25 DIAGNOSIS — Z00.00 ENCOUNTER FOR MEDICARE ANNUAL WELLNESS EXAM: ICD-10-CM

## 2024-09-25 LAB
LEFT EYE DM RETINOPATHY: NEGATIVE
RIGHT EYE DM RETINOPATHY: NEGATIVE

## 2024-10-07 DIAGNOSIS — E03.9 HYPOTHYROIDISM, UNSPECIFIED TYPE: ICD-10-CM

## 2024-10-07 NOTE — TELEPHONE ENCOUNTER
Refill Routing Note   Medication(s) are not appropriate for processing by [facility] for the following reason(s):        Required labs outdated    [facility] action(s):  Defer             Appointments  past 12m or future 3m with PCP    Date Provider   Last Visit   3/11/2024 Erin Gary MD   Next Visit   10/10/2024 Erin Gary MD   ED visits in past 90 days: 0        Note composed:2:15 PM 10/07/2024

## 2024-10-07 NOTE — TELEPHONE ENCOUNTER
No care due was identified.  Health [facility] Embedded Care Due Messages. Reference number: [id number].   10/07/2024 10:05:31 AM CDT

## 2024-10-08 RX ORDER — LEVOTHYROXINE SODIUM 50 UG/1
50 TABLET ORAL
Qty: 90 TABLET | Refills: 0 | Status: SHIPPED | OUTPATIENT
Start: 2024-10-08

## 2024-10-10 ENCOUNTER — LAB VISIT (OUTPATIENT)
Dept: LAB | Facility: HOSPITAL | Age: 89
End: 2024-10-10
Attending: FAMILY MEDICINE
Payer: MEDICARE

## 2024-10-10 ENCOUNTER — OFFICE VISIT (OUTPATIENT)
Dept: FAMILY MEDICINE | Facility: CLINIC | Age: 89
End: 2024-10-10
Attending: FAMILY MEDICINE
Payer: MEDICARE

## 2024-10-10 VITALS
HEART RATE: 65 BPM | WEIGHT: 123.88 LBS | DIASTOLIC BLOOD PRESSURE: 62 MMHG | TEMPERATURE: 96 F | HEIGHT: 59 IN | SYSTOLIC BLOOD PRESSURE: 138 MMHG | RESPIRATION RATE: 18 BRPM | OXYGEN SATURATION: 96 % | BODY MASS INDEX: 24.97 KG/M2

## 2024-10-10 DIAGNOSIS — I15.2 HYPERTENSION ASSOCIATED WITH DIABETES: ICD-10-CM

## 2024-10-10 DIAGNOSIS — H40.1133 PRIMARY OPEN ANGLE GLAUCOMA (POAG) OF BOTH EYES, SEVERE STAGE: ICD-10-CM

## 2024-10-10 DIAGNOSIS — Z00.00 ANNUAL PHYSICAL EXAM: Primary | ICD-10-CM

## 2024-10-10 DIAGNOSIS — E11.9 TYPE 2 DIABETES MELLITUS WITHOUT COMPLICATION, WITHOUT LONG-TERM CURRENT USE OF INSULIN: ICD-10-CM

## 2024-10-10 DIAGNOSIS — H91.93 BILATERAL HEARING LOSS, UNSPECIFIED HEARING LOSS TYPE: ICD-10-CM

## 2024-10-10 DIAGNOSIS — E03.9 HYPOTHYROIDISM, UNSPECIFIED TYPE: ICD-10-CM

## 2024-10-10 DIAGNOSIS — E11.59 HYPERTENSION ASSOCIATED WITH DIABETES: ICD-10-CM

## 2024-10-10 DIAGNOSIS — Z78.0 POSTMENOPAUSAL: ICD-10-CM

## 2024-10-10 DIAGNOSIS — N18.32 STAGE 3B CHRONIC KIDNEY DISEASE: ICD-10-CM

## 2024-10-10 DIAGNOSIS — E66.3 OVERWEIGHT (BMI 25.0-29.9): ICD-10-CM

## 2024-10-10 DIAGNOSIS — I82.512 CHRONIC DEEP VEIN THROMBOSIS (DVT) OF FEMORAL VEIN OF LEFT LOWER EXTREMITY: ICD-10-CM

## 2024-10-10 DIAGNOSIS — R01.1 CARDIAC MURMUR: ICD-10-CM

## 2024-10-10 DIAGNOSIS — I11.9 HYPERTENSIVE HEART DISEASE WITHOUT HEART FAILURE: ICD-10-CM

## 2024-10-10 DIAGNOSIS — I42.1 OBSTRUCTIVE HYPERTROPHIC CARDIOMYOPATHY: ICD-10-CM

## 2024-10-10 LAB
ALBUMIN SERPL BCP-MCNC: 3.1 G/DL (ref 3.5–5.2)
ALP SERPL-CCNC: 72 U/L (ref 55–135)
ALT SERPL W/O P-5'-P-CCNC: 8 U/L (ref 10–44)
AST SERPL-CCNC: 24 U/L (ref 10–40)
BILIRUB DIRECT SERPL-MCNC: 0.1 MG/DL (ref 0.1–0.3)
BILIRUB SERPL-MCNC: 0.3 MG/DL (ref 0.1–1)
CHOLEST SERPL-MCNC: 242 MG/DL (ref 120–199)
CHOLEST/HDLC SERPL: 3.4 {RATIO} (ref 2–5)
ESTIMATED AVG GLUCOSE: 114 MG/DL (ref 68–131)
HBA1C MFR BLD: 5.6 % (ref 4–5.6)
HDLC SERPL-MCNC: 71 MG/DL (ref 40–75)
HDLC SERPL: 29.3 % (ref 20–50)
LDLC SERPL CALC-MCNC: 143.6 MG/DL (ref 63–159)
NONHDLC SERPL-MCNC: 171 MG/DL
PROT SERPL-MCNC: 7.2 G/DL (ref 6–8.4)
TRIGL SERPL-MCNC: 137 MG/DL (ref 30–150)
TSH SERPL DL<=0.005 MIU/L-ACNC: 4.92 UIU/ML (ref 0.4–4)

## 2024-10-10 PROCEDURE — 80061 LIPID PANEL: CPT | Performed by: FAMILY MEDICINE

## 2024-10-10 PROCEDURE — 1126F AMNT PAIN NOTED NONE PRSNT: CPT | Mod: CPTII,S$GLB,, | Performed by: FAMILY MEDICINE

## 2024-10-10 PROCEDURE — 80076 HEPATIC FUNCTION PANEL: CPT | Performed by: FAMILY MEDICINE

## 2024-10-10 PROCEDURE — 1101F PT FALLS ASSESS-DOCD LE1/YR: CPT | Mod: CPTII,S$GLB,, | Performed by: FAMILY MEDICINE

## 2024-10-10 PROCEDURE — 36415 COLL VENOUS BLD VENIPUNCTURE: CPT | Mod: PO | Performed by: FAMILY MEDICINE

## 2024-10-10 PROCEDURE — 84443 ASSAY THYROID STIM HORMONE: CPT | Performed by: FAMILY MEDICINE

## 2024-10-10 PROCEDURE — 1159F MED LIST DOCD IN RCRD: CPT | Mod: CPTII,S$GLB,, | Performed by: FAMILY MEDICINE

## 2024-10-10 PROCEDURE — 84439 ASSAY OF FREE THYROXINE: CPT | Performed by: FAMILY MEDICINE

## 2024-10-10 PROCEDURE — 99397 PER PM REEVAL EST PAT 65+ YR: CPT | Mod: S$GLB,,, | Performed by: FAMILY MEDICINE

## 2024-10-10 PROCEDURE — 3288F FALL RISK ASSESSMENT DOCD: CPT | Mod: CPTII,S$GLB,, | Performed by: FAMILY MEDICINE

## 2024-10-10 PROCEDURE — 1160F RVW MEDS BY RX/DR IN RCRD: CPT | Mod: CPTII,S$GLB,, | Performed by: FAMILY MEDICINE

## 2024-10-10 PROCEDURE — 2023F DILAT RTA XM W/O RTNOPTHY: CPT | Mod: CPTII,S$GLB,, | Performed by: FAMILY MEDICINE

## 2024-10-10 PROCEDURE — 83036 HEMOGLOBIN GLYCOSYLATED A1C: CPT | Performed by: FAMILY MEDICINE

## 2024-10-10 PROCEDURE — 99999 PR PBB SHADOW E&M-EST. PATIENT-LVL V: CPT | Mod: PBBFAC,,, | Performed by: FAMILY MEDICINE

## 2024-10-10 NOTE — PROGRESS NOTES
HISTORY OF PRESENT ILLNESS: Ms. Hammond comes in today not fasting and with taking medication today for annual wellness examination.     END OF LIFE DECISION: She does have a living will. She does not desire life support.    Current Outpatient Medications   Medication Sig    amLODIPine (NORVASC) 10 MG tablet Take 10 mg by mouth once daily.    blood sugar diagnostic (BLOOD GLUCOSE TEST) Strp Use twice daily prn. Dx: E11.9    blood-glucose meter kit Use as instructed - dx: E11.9    bumetanide (BUMEX) 1 MG tablet Take 1 mg by mouth once daily.    cetirizine (ZYRTEC) 10 MG tablet Take 10 mg by mouth once daily.     cloNIDine (CATAPRES) 0.1 MG tablet Take 0.1 mg by mouth 2 (two) times daily.    dorzolamide-timolol 2-0.5% (COSOPT) 22.3-6.8 mg/mL ophthalmic solution 1 drop.    EPINEPHrine (EPIPEN) 0.3 mg/0.3 mL AtIn INJECT 0.3MLS INTO THE MUSCLE ONCE FOR 1 DOSE    famotidine (PEPCID) 40 MG tablet Take 1 tablet (40 mg total) by mouth once daily.    hydrALAZINE (APRESOLINE) 100 MG tablet TAKE ONE TABLET BY MOUTH THREE TIMES DAILY    JANUVIA 100 mg Tab TAKE ONE TABLET BY MOUTH ONCE DAILY    lancets Misc Use twice daily prn. Dx: E11.9    levocetirizine (XYZAL) 5 MG tablet Take 1 tablet (5 mg total) by mouth every evening.    levothyroxine (SYNTHROID) 50 MCG tablet Take 1 tablet (50 mcg total) by mouth before breakfast.    LUMIGAN 0.01 % Drop Place into both eyes.    multivitamin with minerals tablet Take 1 tablet by mouth once daily.     nitroGLYCERIN (NITROSTAT) 0.4 MG SL tablet Place under the tongue.    nystatin (MYCOSTATIN) cream Apply topically 2 (two) times daily.    desoximetasone (TOPICORT) 0.25 % cream 1 application to affected area Externally Twice a day as needed for 14 days    montelukast (SINGULAIR) 10 mg tablet TAKE ONE TABLET BY MOUTH IN THE EVENING      SCREENINGS:    Cholesterol: October 9, 2023.  FFS/Colonoscopy: In the past per patient. No longer needed due to her age.  Dexa Scan: Never.  Mammogram: October  19, 2021 - okay.  She declines.         GYN Exam:  In the past per patient.  Eye Exam: Follows with Dr. Sidney Reeves for glaucoma surveillance with last visit on July 15, 2024 and scheduled for January 2025.   Dental Exam: October 2024 per patient.   PPD: Negative in the past per patient.     Immunizations:   Tdap - > 10 years ago per patient. Advised patient insurance-covered benefit only at local pharmacy.  Zostavax - December 16, 2015.  Shingrix - Never. Advised patient insurance-covered benefit only at local pharmacy.     Prevnar -13-  August 10, 2017.  Pneumovax -  September 24, 2018  Seasonal Flu - September 22, 2023.  RSV - September 19, 2024.  COVID-19 (Pfizer) vaccine series - January 5, 2021, January 26, 2021, November 9, 2021, April 15, 2022. September 16, 2022, September 22, 2023.                        ROS:  GENERAL: Denies fever, chills, fatigue or unusual weight change. Weight 57.2 kg (126 lb 1.7 oz) at March 11, 2024 visit. Appetite normal. Exercises does - 7 times per week, 10 minutes each time with walking. Monitors diet does.  SKIN: Denies rashes, itching, changes in mole, color or texture of skin or easy bruising. Reports occasional itching with small bumps (not now) and concerned about CKD.  HEAD:  Denies headaches or recent head trauma.   EYES: Denies change in vision, pain, diplopia, redness or discharge.  EARS: Denies ear pain, discharge or decreased hearing. Reports occasional vertigo (only with lying down) and takes Meclizine 12.5 mg 1/2 to 1 pill twice daily prn vertigo prescribed by Dr. Darlene Null, ENT, in 2016.  NOSE: Denies loss of smell, epistaxis or rhinitis. Saw Dr. Jacqueline Darnell, Allergist, on February 14, 2023 for angioedema, allergies.  MOUTH & THROAT: Denies hoarseness or change in voice. Denies excessive gum bleeding or mouth sores.  Denies sore throat.    NODES: Denies swollen glands.  CHEST: Denies wheezing, cough, or sputum production.   CARDIOVASCULAR: Denies chest pain, PND,  "orthopnea or reduced exercise tolerance.  Denies palpitations. Reports saw Dr. Faisal Milton, cardiologist, on July 17, 2024 for chest pain, unspecified, personal history of other venous thrombosis and embolism, non-rheumatic aortic (valve) stenosis, hypertensive heart disease without heart failure, obstructive hypertrophic cardiomyopathy, cardiac murmur, unspecified, nontraumatic chronic subdural hemorrhage. Performs home blood pressure checks every other day with levels usually ranging 120's/50-60's.   ABDOMEN: Denies diarrhea, constipation, nausea, vomiting, abdominal pain, or blood in stool.  URINARY: Denies flank pain, dysuria or hematuria.  Reports chronic, occasional nocturia for years. Saw Dr. Zamarripa, nephrologist, on September 3, 2024 for stage 3b chronic kidney disease, primary hypertension, persistent proteinuria.   GENITOURINARY: Denies flank pain, dysuria, frequency or hematuria. No change in menses. Performs monthly breast exams some times.  ENDOCRINE: Denies, diabetes, thyroid, cholesterol problems. Performs home glucose checks daily before breakfast) with levels usually ranging 90's and 107 this morning.   HEME/LYMPH: Denies bleeding problems. Reports saw Dr. Godinez, hematologist/oncologist on June 24, 2024 for surveillance of anemia, unspecified, anemia in chronic kidney disease with monthly labs for surveillance and follow up scheduled for January 2025.  Reports s/p chronic DVT of femoral vein of left lower extremity but no longer takes Eliquis.  PERIPHERAL VASCULAR:Denies claudication or cyanosis.  MUSCULOSKELETAL: Denies joint stiffness, pain or swelling. Denies edema.  NEUROLOGIC: Denies history of seizures, tremors, paralysis, alteration of gait or coordination.  PSYCHIATRIC: Denies mood swings, depression anxiety, homicidal or suicidal thoughts. Denies sleep problems.       PE:   VS: /62   Pulse 65   Temp 96.3 °F (35.7 °C) (Tympanic)   Resp 18   Ht 4' 11" (1.499 m)   Wt 56.2 kg " (123 lb 14.4 oz)   SpO2 96%   BMI 25.02 kg/m²    APPEARANCE:  Well nourished, well developed female, pleasant, elderly, and overweight, alert and oriented in no acute distress.    HEAD: Nontender. Full range of motion.  EYES: PERRL, conjunctiva pink, lids no edema.   EARS: External canal patent, no swelling or redness. TM's shiny and clear. She wears hearing aids.  NOSE: Mucosa and turbinates pink, not swollen. No discharge. Nontender sinuses.  THROAT: No pharyngeal erythema or exudate. No stridor.   NECK: Supple, no mass, thyroid not enlarged.  NODES: No cervical lymph node enlargement.  CHEST: Normal respiratory effort. Lungs clear to auscultation.  CARDIOVASCULAR: Normal S1, S2. No rubs or gallops. Chronic murmur noted. PMI not displaced. No carotid bruit. Pedal pulses palpable bilaterally. No edema.  ABDOMEN: Bowel sounds present. Not distended. Soft. No tenderness, masses or organomegaly. Non tender ventral hernia.  BREAST EXAM: Not examined per patient request.  PELVIC EXAM: Not examined per patient request.  RECTAL EXAM: Not examined per patient request.  MUSCULOSKELETAL: No joint deformities or stiffness. She is ambulatory without problems and has cane for assistance.   SKIN: No rashes or suspicious lesions, normal color and turgor. Chronic benign-appearing seborrheic keratosis at mid-upper back noted.  NEUROLOGIC:   Cranial Nerves: II-XII grossly intact.   DTR's: Knees, Ankles 2+ and equal bilaterally. Gait & Posture: Normal gait and fine motion.  PSYCHIATRIC:Patient alert, oriented x 3. Mood/Affect normal without acute anxiety or depression noted. Judgment/insight good as she makes appropriate decisions during today's exam.    Protective Sensation (w/ 10 gram monofilament):  Right: Intact  Left: Intact    Visual Inspection:  Normal -  Bilateral with clean callus at tip of right great toe and hammertoe at left 4th toes.    Pedal Pulses:   Right: Present  Left: Present    Posterior tibialis:    Right:Present  Left: Present     ASSESSMENT:    ICD-10-CM ICD-9-CM    1. Annual physical exam  Z00.00 V70.0       2. Hypertension associated with diabetes  E11.59 250.80 TSH    I15.2 401.9 Lipid Panel      Hepatic Function Panel      3. Hypertensive heart disease without heart failure  I11.9 402.90       4. Obstructive hypertrophic cardiomyopathy  I42.1 425.11       5. Cardiac murmur  R01.1 785.2       6. Type 2 diabetes mellitus without complication, without long-term current use of insulin  E11.9 250.00 Hemoglobin A1C      Hepatic Function Panel      7. Hypothyroidism, unspecified type  E03.9 244.9 TSH      8. Stage 3b chronic kidney disease  N18.32 585.3       9. Chronic deep vein thrombosis (DVT) of femoral vein of left lower extremity  I82.512 453.51       10. Primary open angle glaucoma (POAG) of both eyes, severe stage  H40.1133 365.11      365.73       11. Bilateral hearing loss, unspecified hearing loss type  H91.93 389.9       12. Overweight (BMI 25.0-29.9)  E66.3 278.02       13. Postmenopausal  Z78.0 V49.81         PLAN:  1. Age-appropriate counseling-appropriate low-sodium, low-cholesterol, low carbohydrate diet and exercise daily, monthly breast self exam, annual wellness examination.   2. Patient advised to call for results.  3. Continue current medications.  4. Keep follow up with specialists.  5. Annual eye and dental examinations advised.  6. Reassurance regarding above concerns as likely age-related; also, discussed CKD with patient.  7. Prescription refills as noted above.  8. Follow up in about 18 weeks (around 2/13/2025) for hypertension and diabetes follow up..    40 minutes of total time spent on the encounter, which includes face to face time and non-face to face time preparing to see the patient (eg, review of tests), Obtaining and/or reviewing separately obtained history, Documenting clinical information in the electronic or other health record, Independently interpreting results (not  separately reported) and communicating results to the patient/family/caregiver, or Care coordination (not separately reported).

## 2024-10-11 ENCOUNTER — PATIENT OUTREACH (OUTPATIENT)
Dept: ADMINISTRATIVE | Facility: HOSPITAL | Age: 89
End: 2024-10-11
Payer: MEDICARE

## 2024-10-11 LAB — T4 FREE SERPL-MCNC: 1.14 NG/DL (ref 0.71–1.51)

## 2024-10-13 NOTE — TELEPHONE ENCOUNTER
----- Message from Abby Elena sent at 6/26/2020  8:25 AM CDT -----  Please call pt regarding covid test [phone]    
appt scheduled   
no

## 2024-10-24 PROBLEM — I11.9 HYPERTENSIVE HEART DISEASE WITHOUT HEART FAILURE: Status: ACTIVE | Noted: 2024-10-24

## 2024-10-25 ENCOUNTER — TELEPHONE (OUTPATIENT)
Dept: HOME HEALTH SERVICES | Facility: CLINIC | Age: 89
End: 2024-10-25
Payer: MEDICARE

## 2024-10-25 ENCOUNTER — TELEPHONE (OUTPATIENT)
Dept: FAMILY MEDICINE | Facility: CLINIC | Age: 89
End: 2024-10-25
Payer: MEDICARE

## 2024-10-25 NOTE — TELEPHONE ENCOUNTER
Patient called and states she has an appointment today with NP but is sick and would like to cancel.  Will call back to reschedule. NP notified.

## 2024-10-25 NOTE — TELEPHONE ENCOUNTER
Called pt and informed her Dr. Gary is booked out and Balta's first available is not until November 14. I recommended she go to urgent care if she feels she needs to be seen today, pt said ok and thank you.

## 2024-12-10 ENCOUNTER — TELEPHONE (OUTPATIENT)
Dept: PODIATRY | Facility: CLINIC | Age: 89
End: 2024-12-10
Payer: MEDICARE

## 2024-12-26 DIAGNOSIS — E11.9 TYPE 2 DIABETES MELLITUS WITHOUT COMPLICATION, WITHOUT LONG-TERM CURRENT USE OF INSULIN: ICD-10-CM

## 2024-12-26 NOTE — TELEPHONE ENCOUNTER
Care Due:                  Date            Visit Type   Department     Provider  --------------------------------------------------------------------------------                                EP -                              PRIMARY      [facility]  Last Visit: 10-      CARE ([facility])   MEDICINE       Erin Gary                              EP -                              PRIMARY      [facility]  Next Visit: 02-      CARE ([facility])   MEDICINE       Erin Gary                                                            Last  Test          Frequency    Reason                     Performed    Due Date  --------------------------------------------------------------------------------    CBC.........  12 months..  hydrALAZINE..............  11-   11-    Health [facility] Embedded Care Due Messages. Reference number: [id number].   12/26/2024 11:37:16 AM CST

## 2024-12-27 RX ORDER — SITAGLIPTIN 100 MG/1
TABLET, FILM COATED ORAL
Qty: 90 TABLET | Refills: 1 | Status: SHIPPED | OUTPATIENT
Start: 2024-12-27

## 2024-12-27 NOTE — TELEPHONE ENCOUNTER
Refill Routing Note   Medication(s) are not appropriate for processing by [facility] for the following reason(s):        Required labs abnormal    [facility] action(s):  Defer     Requires labs : Yes           Pharmacist review requested: Yes     Appointments  past 12m or future 3m with PCP    Date Provider   Last Visit   10/10/2024 Erin Gary MD   Next Visit   2/13/2025 Erin Gary MD   ED visits in past 90 days: 0        Note composed:6:36 PM 12/26/2024

## 2024-12-27 NOTE — TELEPHONE ENCOUNTER
Refill Routing Note   Medication(s) are not appropriate for processing by [facility] for the following reason(s):        Required labs abnormal: CrCl 20 ml/min    [facility] action(s):  Defer   Requires labs : Yes - CBC outdated   Recommended dose adjustment to 25 mg daily.       Pharmacist review requested: Yes     Appointments  past 12m or future 3m with PCP    Date Provider   Last Visit   10/10/2024 Erin Gary MD   Next Visit   2/13/2025 Erin Gary MD   ED visits in past 90 days: 0        Note composed:7:32 PM 12/26/2024

## 2025-01-01 DIAGNOSIS — E03.9 HYPOTHYROIDISM, UNSPECIFIED TYPE: ICD-10-CM

## 2025-01-02 ENCOUNTER — TELEPHONE (OUTPATIENT)
Dept: PODIATRY | Facility: CLINIC | Age: OVER 89
End: 2025-01-02
Payer: MEDICARE

## 2025-01-02 RX ORDER — LEVOTHYROXINE SODIUM 50 UG/1
50 TABLET ORAL
Qty: 90 TABLET | Refills: 3 | Status: SHIPPED | OUTPATIENT
Start: 2025-01-02

## 2025-01-02 NOTE — TELEPHONE ENCOUNTER
Spoke with the patient to let her know that there are no sooner appt and I added he to the wait list               ----- Message from Ty sent at 1/2/2025  2:13 PM CST -----  Contact: Edie  .Type:  Sooner Apoointment Request    Caller is requesting a sooner appointment.      Name of Caller: Edie   When is the first available appointment? Pt is scheduled on 01/15  Symptoms: Calluses   Would the patient rather a call back or a response via [facility]?  Call back   Best Call Back Number: [phone]   Additional Information:      Thanks

## 2025-01-02 NOTE — TELEPHONE ENCOUNTER
No care due was identified.  Health [facility] Embedded Care Due Messages. Reference number: [id number].   1/01/2025 8:20:59 PM CST

## 2025-01-03 NOTE — TELEPHONE ENCOUNTER
Refill Decision Note   Edie Riteshkeon  is requesting a refill authorization.  Brief Assessment and Rationale for Refill:  Approve     Medication Therapy Plan: [id number]      Comments:     Note composed:6:15 PM 01/02/2025

## 2025-01-15 ENCOUNTER — OFFICE VISIT (OUTPATIENT)
Dept: PODIATRY | Facility: CLINIC | Age: OVER 89
End: 2025-01-15
Payer: MEDICARE

## 2025-01-15 VITALS — BODY MASS INDEX: 24.97 KG/M2 | HEIGHT: 59 IN | WEIGHT: 123.88 LBS

## 2025-01-15 DIAGNOSIS — L84 PRE-ULCERATIVE CALLUSES: ICD-10-CM

## 2025-01-15 DIAGNOSIS — E11.9 ENCOUNTER FOR COMPREHENSIVE DIABETIC FOOT EXAMINATION, TYPE 2 DIABETES MELLITUS: Primary | ICD-10-CM

## 2025-01-15 PROCEDURE — 99999 PR PBB SHADOW E&M-EST. PATIENT-LVL III: CPT | Mod: PBBFAC,,, | Performed by: PODIATRIST

## 2025-01-15 PROCEDURE — 1159F MED LIST DOCD IN RCRD: CPT | Mod: CPTII,S$GLB,, | Performed by: PODIATRIST

## 2025-01-15 PROCEDURE — 1125F AMNT PAIN NOTED PAIN PRSNT: CPT | Mod: CPTII,S$GLB,, | Performed by: PODIATRIST

## 2025-01-15 PROCEDURE — 1101F PT FALLS ASSESS-DOCD LE1/YR: CPT | Mod: CPTII,S$GLB,, | Performed by: PODIATRIST

## 2025-01-15 PROCEDURE — 99214 OFFICE O/P EST MOD 30 MIN: CPT | Mod: 25,S$GLB,, | Performed by: PODIATRIST

## 2025-01-15 PROCEDURE — 1160F RVW MEDS BY RX/DR IN RCRD: CPT | Mod: CPTII,S$GLB,, | Performed by: PODIATRIST

## 2025-01-15 PROCEDURE — 3288F FALL RISK ASSESSMENT DOCD: CPT | Mod: CPTII,S$GLB,, | Performed by: PODIATRIST

## 2025-01-15 NOTE — PROGRESS NOTES
Subjective:     Patient ID: Edie Hammond is a 89 y.o. female.    Chief Complaint: Callouses (Diabetic pt c/o BL calluses, pt rates 10/10 pain, pt wears flats, PCP Erin Gary last seen 10/10/2024)    Edie is a 89 y.o. female who presents to the clinic upon referral from Dr. Abi salazar. provider found  for evaluation and treatment of diabetic feet. Edie has a past medical history of Diabetes, Glaucoma, Hand arthritis, Heart murmur, HTN (hypertension), Hypothyroidism, Intracranial hemorrhage, Postmenopausal, Seasonal allergies, and Type 2 diabetes mellitus. Patient relates no major problem with feet. Only complaints today consist of painful left 4th toe callus. Patient rates pain 10/10. Patient points to tip of left 4th toe.     PCP: Erin Gary MD    Date Last Seen by PCP: 10/10/2024    Current shoe gear: Flats    Hemoglobin A1C   Date Value Ref Range Status   10/10/2024 5.6 4.0 - 5.6 % Final     Comment:     ADA Screening Guidelines:  5.7-6.4%  Consistent with prediabetes  >or=6.5%  Consistent with diabetes    High levels of fetal hemoglobin interfere with the HbA1C  assay. Heterozygous hemoglobin variants (HbS, HgC, etc)do  not significantly interfere with this assay.   However, presence of multiple variants may affect accuracy.     03/11/2024 5.2 4.0 - 5.6 % Final     Comment:     ADA Screening Guidelines:  5.7-6.4%  Consistent with prediabetes  >or=6.5%  Consistent with diabetes    High levels of fetal hemoglobin interfere with the HbA1C  assay. Heterozygous hemoglobin variants (HbS, HgC, etc)do  not significantly interfere with this assay.   However, presence of multiple variants may affect accuracy.     10/09/2023 6.1 (H) 4.0 - 5.6 % Final     Comment:     ADA Screening Guidelines:  5.7-6.4%  Consistent with prediabetes  >or=6.5%  Consistent with diabetes    High levels of fetal hemoglobin interfere with the HbA1C  assay. Heterozygous hemoglobin variants (HbS, HgC, etc)do  not  significantly interfere with this assay.   However, presence of multiple variants may affect accuracy.           Patient Active Problem List   Diagnosis    Hypertension associated with diabetes    Postmenopausal    Hypothyroidism    Cardiac murmur    Overweight (BMI 25.0-29.9)    Aortic stenosis    Type 2 diabetes mellitus without complication, without long-term current use of insulin    History of angioedema    Obstructive hypertrophic cardiomyopathy    Primary open angle glaucoma (POAG) of both eyes, severe stage    History of subdural hematoma    Bilateral hearing loss    Normocytic anemia    Stage 3b chronic kidney disease    Chronic deep vein thrombosis (DVT) of femoral vein of left lower extremity    Bradycardia    Iron deficiency anemia due to chronic blood loss    Diastolic congestive heart failure, unspecified HF chronicity    Stage 3a chronic kidney disease    Hypertensive heart disease without heart failure       Medication List with Changes/Refills   Current Medications    AMLODIPINE (NORVASC) 10 MG TABLET    Take 10 mg by mouth once daily.    BLOOD SUGAR DIAGNOSTIC (BLOOD GLUCOSE TEST) STRP    Use twice daily prn. Dx: E11.9    BLOOD-GLUCOSE METER KIT    Use as instructed - dx: E11.9    BUMETANIDE (BUMEX) 1 MG TABLET    Take 1 mg by mouth once daily.    CETIRIZINE (ZYRTEC) 10 MG TABLET    Take 10 mg by mouth once daily.     CLONIDINE (CATAPRES) 0.1 MG TABLET    Take 0.1 mg by mouth 2 (two) times daily.    DESOXIMETASONE (TOPICORT) 0.25 % CREAM    1 application to affected area Externally Twice a day as needed for 14 days    DORZOLAMIDE-TIMOLOL 2-0.5% (COSOPT) 22.3-6.8 MG/ML OPHTHALMIC SOLUTION    1 drop.    EPINEPHRINE (EPIPEN) 0.3 MG/0.3 ML ATIN    INJECT 0.3MLS INTO THE MUSCLE ONCE FOR 1 DOSE    FAMOTIDINE (PEPCID) 40 MG TABLET    Take 1 tablet (40 mg total) by mouth once daily.    HYDRALAZINE (APRESOLINE) 100 MG TABLET    TAKE ONE TABLET BY MOUTH THREE TIMES DAILY    JANUVIA 100 MG TAB    TAKE ONE  TABLET BY MOUTH ONCE DAILY    LANCETS MISC    Use twice daily prn. Dx: E11.9    LEVOCETIRIZINE (XYZAL) 5 MG TABLET    Take 1 tablet (5 mg total) by mouth every evening.    LEVOTHYROXINE (SYNTHROID) 50 MCG TABLET    TAKE ONE TABLET BY MOUTH BEFORE BREAKFAST    LUMIGAN 0.01 % DROP    Place into both eyes.    MONTELUKAST (SINGULAIR) 10 MG TABLET    TAKE ONE TABLET BY MOUTH IN THE EVENING    MULTIVITAMIN WITH MINERALS TABLET    Take 1 tablet by mouth once daily.     NITROGLYCERIN (NITROSTAT) 0.4 MG SL TABLET    Place under the tongue.    NYSTATIN (MYCOSTATIN) CREAM    Apply topically 2 (two) times daily.       Review of patient's allergies indicates:   Allergen Reactions    Brimonidine tartrate Itching and Swelling    Losartan Anaphylaxis and Swelling    Amlodipine besylate Edema and Other (See Comments)     Other reaction(s): swelling of feet    Metformin hcl Diarrhea       Past Surgical History:   Procedure Laterality Date    bladder lift      BREAST BIOPSY      BUNIONECTOMY Bilateral     1970s    CATARACT EXTRACTION Bilateral     1999    CHOLECYSTECTOMY      CRANIOTOMY  2017    s/p fall    FOOT NEUROMA SURGERY Right 1982    R foot - neuroma removed    MYOMECTOMY      TOTAL ABDOMINAL HYSTERECTOMY W/ BILATERAL SALPINGOOPHORECTOMY      due to fibroid       Family History   Problem Relation Name Age of Onset    No Known Problems Son      Heart attack Mother      Diabetes Sister      Hypertension Sister      Hypertension Sister      Breast cancer Maternal Aunt      Stroke Neg Hx         Social History     Socioeconomic History    Marital status:    Occupational History    Occupation: Retired    Tobacco Use    Smoking status: Never     Passive exposure: Never    Smokeless tobacco: Never   Substance and Sexual Activity    Alcohol use: Not Currently    Drug use: Never   Social History Narrative    She wears seatbelt.     Social Drivers of Health     Financial Resource Strain: Low Risk  (3/8/2024)     "Overall Financial Resource Strain (CARDIA)     Difficulty of Paying Living Expenses: Not hard at all   Food Insecurity: No Food Insecurity (3/8/2024)    Hunger Vital Sign     Worried About Running Out of Food in the Last Year: Never true     Ran Out of Food in the Last Year: Never true   Transportation Needs: No Transportation Needs (3/8/2024)    PRAPARE - Transportation     Lack of Transportation (Medical): No     Lack of Transportation (Non-Medical): No   Physical Activity: Insufficiently Active (10/10/2024)    Exercise Vital Sign     Days of Exercise per Week: 7 days     Minutes of Exercise per Session: 10 min   Stress: No Stress Concern Present (3/8/2024)    [employer] - Occupational Stress Questionnaire     Feeling of Stress : Not at all   Housing Stability: Low Risk  (3/8/2024)    Housing Stability Vital Sign     Unable to Pay for Housing in the Last Year: No     Number of Places Lived in the Last Year: 1     Unstable Housing in the Last Year: No       Vitals:    01/15/25 1008   Weight: 56.2 kg (123 lb 14.4 oz)   Height: 4' 11" (1.499 m)   PainSc: 10-Worst pain ever       Hemoglobin A1C   Date Value Ref Range Status   10/10/2024 5.6 4.0 - 5.6 % Final     Comment:     ADA Screening Guidelines:  5.7-6.4%  Consistent with prediabetes  >or=6.5%  Consistent with diabetes    High levels of fetal hemoglobin interfere with the HbA1C  assay. Heterozygous hemoglobin variants (HbS, HgC, etc)do  not significantly interfere with this assay.   However, presence of multiple variants may affect accuracy.     03/11/2024 5.2 4.0 - 5.6 % Final     Comment:     ADA Screening Guidelines:  5.7-6.4%  Consistent with prediabetes  >or=6.5%  Consistent with diabetes    High levels of fetal hemoglobin interfere with the HbA1C  assay. Heterozygous hemoglobin variants (HbS, HgC, etc)do  not significantly interfere with this assay.   However, presence of multiple variants may affect accuracy.     10/09/2023 " 6.1 (H) 4.0 - 5.6 % Final     Comment:     ADA Screening Guidelines:  5.7-6.4%  Consistent with prediabetes  >or=6.5%  Consistent with diabetes    High levels of fetal hemoglobin interfere with the HbA1C  assay. Heterozygous hemoglobin variants (HbS, HgC, etc)do  not significantly interfere with this assay.   However, presence of multiple variants may affect accuracy.         Review of Systems   Constitutional:  Negative for chills and fever.   Respiratory:  Negative for shortness of breath.    Cardiovascular:  Negative for chest pain, palpitations, orthopnea, claudication and leg swelling.   Gastrointestinal:  Negative for diarrhea, nausea and vomiting.   Musculoskeletal:  Negative for joint pain.   Skin:  Negative for rash.   Neurological:  Positive for tingling. Negative for dizziness, sensory change, focal weakness and weakness.   Psychiatric/Behavioral: Negative.               Objective:      PHYSICAL EXAM: Apperance: Alert and orient in no distress,well developed, and with good attention to grooming and body habits  Patient presents ambulating in flats.   LOWER EXTREMITY EXAM:  VASCULAR: Dorsalis pedis pulses 1/4 bilateral and Posterior Tibial pulses 1/4 bilateral. Capillary fill time <blanched bilateral. Mild edema observed bilateral. Varicosities present bilateral. Skin temperature of the lower extremities is warm to warm, proximal to distal. Hair growth dim bilateral.  DERMATOLOGICAL: No skin rashes, subcutaneous nodules, lesions, or ulcers observed bilateral. Nails 1,2,3,4,5 bilateral normal length. Webspaces 1,2,3,4 bilateral clean, dry and without evidence of break in skin integrity. Mild hyperkeratotic tissue noted to right plantar hallux, left distal 2nd and 4th toes, and left dorsal 4th toe   NEUROLOGICAL: Light touch, sharp-dull, proprioception all present and equal bilaterally.  Vibratory sensation diminished at bilateral phalanx. Protective sensation decreased sites as tested with a  Clovis-Eben 5.07 monofilament.   MUSCULOSKELETAL: Muscle strength is 5/5 for foot inverters, everters, plantarflexors, and dorsiflexors. Muscle tone is normal.       Assessment:       ICD-10-CM ICD-9-CM   1. Encounter for comprehensive diabetic foot examination, type 2 diabetes mellitus  E11.9 250.00   2. Pre-ulcerative calluses  L84 700         Plan:   Encounter for comprehensive diabetic foot examination, type 2 diabetes mellitus    Pre-ulcerative calluses    I counseled the patient on her conditions, regarding findings of my examination, my impressions, and usual treatment plan.   This visit spent on counseling and coordination of care.  Appointment spent on education about the diabetic foot, neuropathy, and prevention of limb loss.  Shoe inspection. Diabetic Foot Education. Patient reminded of the importance of good nutrition and blood sugar control to help prevent podiatric complications of diabetes. Patient instructed on proper foot hygeine. We discussed wearing proper shoe gear, daily foot inspections, never walking without protective shoe gear, never putting sharp instruments to feet.    The patient and I reviewed the types of shoes she should be wearing, my recommendation includes generally the best time of the day for a shoe fitting is the afternoon, shoes with a wide toe box, very good cushion, and tennis shoes with removable inner soles.The patient and I reviewed my recommendations for over-the-counter orthotic inserts.   Patient  will continue to monitor the areas daily, inspect feet, wear protective shoe gear when ambulatory, moisturizer to maintain skin integrity. Patient reminded of the importance of good nutrition and blood sugar control to help prevent podiatric complications of diabetes.  Patient to return 12 months or sooner if needed.          Sheryl Hoskins DPM  [facility]

## 2025-01-27 ENCOUNTER — TELEPHONE (OUTPATIENT)
Dept: FAMILY MEDICINE | Facility: CLINIC | Age: OVER 89
End: 2025-01-27
Payer: MEDICARE

## 2025-01-27 NOTE — TELEPHONE ENCOUNTER
----- Message from Giselle sent at 1/27/2025 11:24 AM CST -----  Contact: [phone]  [email or web address]     Patient is calling for Medical Advice regarding:appt for tomorrow     How long has patient had these symptoms:needs to reschedule the HFU     Pharmacy name and phone#:    Patient wants a call back or thru [facility]:call back     Comments:  She has an appt tomorrow she states please give return call   Please advise patient replies from provider may take up to 48 hours.

## 2025-01-27 NOTE — TELEPHONE ENCOUNTER
Patient contacted and informed me that she needed to schedule a hospital f/u appt. Her appt was scheduled. She verbally understood the information given.

## 2025-01-28 ENCOUNTER — TELEPHONE (OUTPATIENT)
Dept: ALLERGY | Facility: CLINIC | Age: OVER 89
End: 2025-01-28
Payer: MEDICARE

## 2025-01-31 ENCOUNTER — OFFICE VISIT (OUTPATIENT)
Dept: FAMILY MEDICINE | Facility: CLINIC | Age: OVER 89
End: 2025-01-31
Attending: FAMILY MEDICINE
Payer: MEDICARE

## 2025-01-31 VITALS
SYSTOLIC BLOOD PRESSURE: 142 MMHG | BODY MASS INDEX: 23.37 KG/M2 | HEIGHT: 59 IN | TEMPERATURE: 96 F | WEIGHT: 115.94 LBS | OXYGEN SATURATION: 98 % | HEART RATE: 56 BPM | DIASTOLIC BLOOD PRESSURE: 72 MMHG

## 2025-01-31 DIAGNOSIS — Z78.0 POSTMENOPAUSAL: ICD-10-CM

## 2025-01-31 DIAGNOSIS — I15.2 HYPERTENSION ASSOCIATED WITH DIABETES: Primary | ICD-10-CM

## 2025-01-31 DIAGNOSIS — I50.30 DIASTOLIC CONGESTIVE HEART FAILURE, UNSPECIFIED HF CHRONICITY: ICD-10-CM

## 2025-01-31 DIAGNOSIS — E11.59 HYPERTENSION ASSOCIATED WITH DIABETES: Primary | ICD-10-CM

## 2025-01-31 DIAGNOSIS — I82.512 CHRONIC DEEP VEIN THROMBOSIS (DVT) OF FEMORAL VEIN OF LEFT LOWER EXTREMITY: ICD-10-CM

## 2025-01-31 DIAGNOSIS — N18.32 STAGE 3B CHRONIC KIDNEY DISEASE: ICD-10-CM

## 2025-01-31 NOTE — PROGRESS NOTES
Edie Hammond    Chief Complaint   Patient presents with    Hospital Follow Up       History of Present Illness:   Transitional Care Note    Family and/or Caretaker present at visit?  Yes; brenda Wilson and retired nurse.  Diagnostic tests reviewed/disposition: No diagnosic tests pending after this hospitalization.  Disease/illness education: Discussed issues with patient and friend  Home health/community services discussion/referrals: Patient does not have home health established from hospital visit.  They do not need home health.  If needed, we will set up home health for the patient. However, she states she receives in-home PT/OT with [facility] 2 times per week.  Establishment or re-establishment of referral orders for community resources: No other necessary community resources.   Discussion with other health care providers: No discussion with other health care providers necessary.       Ms. Hammond comes in today for hospital follow-up.  She is accompanied by her friend and retired nurse Nick Pastrana.  Per hospital discharge summary on Epic she was hospitalized at [facility] on January 18, 2025 through January 20, 2025 for angioedema.  She presented to ER with lip swelling, hoarseness.  Trigger was suspected to be IV contrast as she underwent contrasted imaging (CT scan ordered by cardiologist Dr. Lemus and states will soon have heart precatheterization and aortic valve replacement on February 20, 2025) on the day of symptom onset.  She received Benadryl, Pepcid, Claritin, epinephrine, Solu-Medrol on admission with significant improvement.  She had some difficulty with swallowing on January 19, 2025 and was given additional IV Solu-Medrol and Benadryl and monitored overnight.  Her symptoms resolved.  She was discharged home to continue prednisone taper, renally dosed Pepcid, Zyrtec.  She was advised to follow-up with her allergist. She has follow up with allergist Dr. Darnell scheduled for  2/11/2025.    She states she completed prednisone taper 5 days ago.  She states glucose this morning was 124.    She complains of having involuntary movement with her right leg and foot and with weakness in her leg this week (since Monday).  She also complains of having very little weakness at left leg and at hand.  She states she has helpers at work.  She states she uses walker at home since she left the hospital.  She states she is not getting home health but later states she gets in home physical therapy/occupational therapy with [facility].    She states she has taken all of her medications today.    Otherwise, she denies having fever, chills, fatigue, appetite changes; shortness of breath, cough, wheezing; chest pain, palpitations, leg swelling; abdominal pain, nausea, vomiting, diarrhea, constipation; unusual urinary symptoms; polydipsia, polyphagia, polyuria, hot or cold intolerance; back pain; numbness, headache; anxiety, depression, homicidal or suicidal thoughts.      She saw Dr. Kavin Godinez, hematology/oncology, on January 7, 2025 for Nonrheumatic aortic valve stenosis, stage III CKD, iron-deficiency anemia due to chronic blood loss, type 2 diabetes mellitus, history of subdural hematoma, chronic DVT of LLE.                 Current Outpatient Medications   Medication Sig    amLODIPine (NORVASC) 10 MG tablet Take 10 mg by mouth once daily.    blood sugar diagnostic (BLOOD GLUCOSE TEST) Strp Use twice daily prn. Dx: E11.9    blood-glucose meter kit Use as instructed - dx: E11.9    bumetanide (BUMEX) 1 MG tablet Take 1 mg by mouth once daily.    cetirizine (ZYRTEC) 10 MG tablet Take 10 mg by mouth once daily.     cloNIDine (CATAPRES) 0.1 MG tablet Take 0.1 mg by mouth 2 (two) times daily.    dorzolamide-timolol 2-0.5% (COSOPT) 22.3-6.8 mg/mL ophthalmic solution 1 drop.    EPINEPHrine (EPIPEN) 0.3 mg/0.3 mL AtIn INJECT 0.3MLS INTO THE MUSCLE ONCE FOR 1 DOSE    famotidine (PEPCID) 40 MG tablet Take 1 tablet  (40 mg total) by mouth once daily.    hydrALAZINE (APRESOLINE) 100 MG tablet TAKE ONE TABLET BY MOUTH THREE TIMES DAILY    JANUVIA 100 mg Tab TAKE ONE TABLET BY MOUTH ONCE DAILY    lancets Misc Use twice daily prn. Dx: E11.9    levocetirizine (XYZAL) 5 MG tablet Take 1 tablet (5 mg total) by mouth every evening.    levothyroxine (SYNTHROID) 50 MCG tablet TAKE ONE TABLET BY MOUTH BEFORE BREAKFAST    LUMIGAN 0.01 % Drop Place into both eyes.    multivitamin with minerals tablet Take 1 tablet by mouth once daily.     nitroGLYCERIN (NITROSTAT) 0.4 MG SL tablet Place under the tongue.    nystatin (MYCOSTATIN) cream Apply topically 2 (two) times daily.       Review of Systems   Constitutional:  Negative for activity change, appetite change, chills, fatigue and fever.   Respiratory:  Negative for cough, shortness of breath and wheezing.    Cardiovascular:  Negative for chest pain, palpitations and leg swelling.   Gastrointestinal:  Negative for abdominal pain, constipation, diarrhea, nausea and vomiting.   Endocrine: Negative for cold intolerance, heat intolerance, polydipsia, polyphagia and polyuria.   Genitourinary:  Negative for difficulty urinating.   Musculoskeletal:  Negative for back pain.   Neurological:  Positive for weakness. Negative for numbness and headaches.   Psychiatric/Behavioral:  Negative for dysphoric mood and suicidal ideas. The patient is not nervous/anxious.         Negative for homicidal ideas.       Objective:  Physical Exam  Vitals and nursing note reviewed.   Constitutional:       General: She is not in acute distress.     Appearance: Normal appearance. She is well-developed. She is not ill-appearing, toxic-appearing or diaphoretic.   HENT:      Ears:      Comments: Bilateral hearing aids.  Cardiovascular:      Rate and Rhythm: Normal rate and regular rhythm.      Heart sounds: Murmur heard.      No gallop.   Pulmonary:      Effort: Pulmonary effort is normal.      Breath sounds: Normal breath  sounds.   Abdominal:      General: Bowel sounds are normal. There is no distension.      Palpations: Abdomen is soft. There is no mass.      Tenderness: There is no abdominal tenderness. There is no guarding or rebound.      Hernia: A hernia is present.      Comments: Non tender ventral hernia.   Musculoskeletal:         General: No swelling or tenderness. Normal range of motion.      Cervical back: Normal range of motion and neck supple. No tenderness.      Comments: She sits in the wheelchair during visit today. She moves all extremities with stiffness noted.   Lymphadenopathy:      Cervical: No cervical adenopathy.   Skin:     General: Skin is warm and dry.   Neurological:      Mental Status: She is alert.      Motor: No abnormal muscle tone.   Psychiatric:         Mood and Affect: Mood normal.         Speech: Speech normal.         Behavior: Behavior normal.         Thought Content: Thought content normal.         Judgment: Judgment normal.         ASSESSMENT:  1. Hypertension associated with diabetes    2. Diastolic congestive heart failure, unspecified HF chronicity    3. Stage 3b chronic kidney disease    4. Chronic deep vein thrombosis (DVT) of femoral vein of left lower extremity    5. Postmenopausal        PLAN:  Edie was seen today for hospital follow up.    Diagnoses and all orders for this visit:    Hypertension associated with diabetes - stable on medication    Diastolic congestive heart failure, unspecified HF chronicity - stable and managed by cardiology    Stage 3b chronic kidney disease - eGFR of 22 at 1/20/2025    Chronic deep vein thrombosis (DVT) of femoral vein of left lower extremity - stable and managed by heme/onc    Postmenopausal      Advised patient monitor symptoms closely and proceed to ER for worsening symptoms; however, advised patient shaking sensation may be due to recent use of steroids.  Continue current medications, follow low sodium, low cholesterol, low carb diet, daily  walks.  Keep follow up with specialists (including in-home PT/OT).  Flu shot this fall.  Follow up if symptoms worsen or fail to improve. Keep 2/23/2025 scheduled appointment with me.

## 2025-02-06 ENCOUNTER — TELEPHONE (OUTPATIENT)
Dept: PODIATRY | Facility: CLINIC | Age: OVER 89
End: 2025-02-06
Payer: MEDICARE

## 2025-02-07 ENCOUNTER — TELEPHONE (OUTPATIENT)
Dept: PODIATRY | Facility: CLINIC | Age: OVER 89
End: 2025-02-07
Payer: MEDICARE

## 2025-02-07 NOTE — TELEPHONE ENCOUNTER
SHANNON for pt to give call back      ----- Message from Bambi sent at 2/7/2025  2:19 PM CST -----  Type:  Patient Returning Call    Who Called:Edie  Who Left Message for Patient:Nurse  Does the patient know what this is regarding?:  Would the patient rather a call back or a response via [facility]? Callback  Best Call Back Number:[phone]  Additional Information: Returning call

## 2025-02-10 ENCOUNTER — TELEPHONE (OUTPATIENT)
Dept: FAMILY MEDICINE | Facility: CLINIC | Age: OVER 89
End: 2025-02-10
Payer: MEDICARE

## 2025-02-10 ENCOUNTER — TELEPHONE (OUTPATIENT)
Dept: PODIATRY | Facility: CLINIC | Age: OVER 89
End: 2025-02-10
Payer: MEDICARE

## 2025-02-10 NOTE — TELEPHONE ENCOUNTER
----- Message from Halie sent at 2/10/2025 10:02 AM CST -----  .Type: Patient Call Back        Who called:   Patient      What is the request in detail:    Called in with concerns about upcoming appt . Please call back   Can the clinic reply by [facility]?           Would the patient rather a call back or a response via [facility]?   call      Best call back number:  [phone]

## 2025-02-10 NOTE — TELEPHONE ENCOUNTER
----- Message from Corafarhana sent at 2/10/2025 10:54 AM CST -----  Contact: Edie  Type:  Patient Returning Call    Who Called:Edie  Who Left Message for Patient:nurse  Does the patient know what this is regarding?:missed call  Would the patient rather a call back or a response via [facility]? call  Best Call Back Number:[phone] or [phone]    Additional Information:

## 2025-02-10 NOTE — TELEPHONE ENCOUNTER
Spoke with pt to schedule her an appt with Dr. Hoskins.      ----- Message from Riri sent at 2/10/2025  9:34 AM CST -----  Name of Who is Calling: Pt         What is the request in detail:Pt would like a call back in regards to a calluses being removed. Pt has stated that the last appt calluses was filed down, but is still having more issues. Please advise thank you         Can the clinic reply by [facility]:no        What Number to Call Back if not in [facility]:Telephone Information:  Mobile          [phone]

## 2025-02-10 NOTE — TELEPHONE ENCOUNTER
Patient called in requesting to reschedule 2/13 appointment due to upcoming procedure. Pt is rescheduled with PCP on 3/6. Pt verbalized understanding.

## 2025-02-11 ENCOUNTER — OFFICE VISIT (OUTPATIENT)
Dept: ALLERGY | Facility: CLINIC | Age: OVER 89
End: 2025-02-11
Payer: MEDICARE

## 2025-02-11 DIAGNOSIS — T50.8X5D ALLERGIC REACTION TO CONTRAST MATERIAL, SUBSEQUENT ENCOUNTER: Primary | ICD-10-CM

## 2025-02-11 DIAGNOSIS — Z88.9 MULTIPLE DRUG ALLERGIES: ICD-10-CM

## 2025-02-11 RX ORDER — EPINEPHRINE 0.3 MG/.3ML
1 INJECTION SUBCUTANEOUS ONCE
Qty: 2 EACH | Refills: 1 | Status: SHIPPED | OUTPATIENT
Start: 2025-02-11 | End: 2025-02-11

## 2025-02-11 NOTE — PROGRESS NOTES
Subjective:      Patient ID: Edie Hammond is a 89 y.o. female.      The patient location is: [place]   The chief complaint leading to consultation is: Follow up    Visit type: audiovisual    Face to Face time with patient: 33 minutes of total time spent on the encounter, which includes face to face time and non-face to face time preparing to see the patient (eg, review of tests), Obtaining and/or reviewing separately obtained history, Documenting clinical information in the electronic or other health record, Independently interpreting results (not separately reported) and communicating results to the patient/family/caregiver, or Care coordination (not separately reported).         Each patient to whom he or she provides medical services by telemedicine is:  (1) informed of the relationship between the physician and patient and the respective role of any other health care provider with respect to management of the patient; and (2) notified that he or she may decline to receive medical services by telemedicine and may withdraw from such care at any time.    Notes:      Chief Complaint:  Follow-up      HPI: 2/11/2025:  89 year old female  Angioedema- Jan 18-20 hospitalization-throat swelling admitted- CT study with contrast on the day of admission prior to the onset of symptoms.  No ICU admission  Heart cath- prednisone and benadryl before- no reaction- 5 days ago    NO swelling apart from contrast exposure  She will have an aortic vail procedure next week      Past Medical History:   Diagnosis Date    Diabetes     Glaucoma     Follows with Dr. James Reeves, ophthalmologist    Hand arthritis     Heart murmur     Follows with Dr. Faisal Milton, cardiology    HTN (hypertension)     Hypothyroidism     Intracranial hemorrhage     Postmenopausal     Seasonal allergies     Type 2 diabetes mellitus         Environmental History: non-contributory  Review of Systems   All other systems reviewed and are  negative.      Objective:   Physical Exam  Alert and oriented in NAD, speaking in complete sentences    Assessment:      1. Allergic reaction to contrast material, subsequent encounter    2. Multiple drug allergies        Plan:     Allergic reaction to contrast material, subsequent encounter  -     EPINEPHrine (EPIPEN) 0.3 mg/0.3 mL AtIn; Inject 0.3 mLs (0.3 mg total) into the muscle once. for 1 dose  Dispense: 2 each; Refill: 1  -     C1 Esterase Inhibitor Protein; Future; Expected date: 02/11/2025  -     C1 Esterase Inhibitor, Functional; Future; Expected date: 02/11/2025  -     C1Q Binding Assay; Future; Expected date: 02/11/2025  -     CBC Auto Differential; Future; Expected date: 02/11/2025  -     Comprehensive Metabolic Panel; Future; Expected date: 02/11/2025  -     C3 Complement; Future; Expected date: 02/11/2025  -     C4 Complement; Future; Expected date: 02/11/2025  -     Protein Electrophoresis, Serum; Future; Expected date: 02/11/2025  -     Tryptase; Future; Expected date: 02/11/2025    Multiple drug allergies       Labs ordered.  If contrasted study is needed, recommend low molecular weight contrast and premedications- benadryl and prednisone- Prednisone at 13, 8 and 1 hour prior to the procedure. Benadryl one hour prior.    Recommend she continue to avoid medications that have caused symptoms. Recommend continued avoidance of ACE/ARBs    RTC 2-3 weeks to discuss results.     CORNEL GAN spent 30 minutes on the day of the visit.  This includes face to face time and non-face to face time preparing to see the patient (eg, review of tests), obtaining and/or reviewing separately obtained history, documenting clinical information in the electronic or other health record, independently interpreting results and communicating results to the patient/family/caregiver, or care coordinator.

## 2025-02-13 ENCOUNTER — OFFICE VISIT (OUTPATIENT)
Dept: PODIATRY | Facility: CLINIC | Age: OVER 89
End: 2025-02-13
Payer: MEDICARE

## 2025-02-13 VITALS — BODY MASS INDEX: 23.37 KG/M2 | WEIGHT: 115.94 LBS | HEIGHT: 59 IN

## 2025-02-13 DIAGNOSIS — M79.674 CHRONIC TOE PAIN, RIGHT FOOT: Primary | ICD-10-CM

## 2025-02-13 DIAGNOSIS — L84 PRE-ULCERATIVE CALLUSES: ICD-10-CM

## 2025-02-13 DIAGNOSIS — G89.29 CHRONIC TOE PAIN, RIGHT FOOT: Primary | ICD-10-CM

## 2025-02-13 DIAGNOSIS — E11.9 TYPE 2 DIABETES MELLITUS WITHOUT COMPLICATION, WITHOUT LONG-TERM CURRENT USE OF INSULIN: ICD-10-CM

## 2025-02-13 NOTE — PROGRESS NOTES
Subjective:     Patient ID: Edie Hammond is a 89 y.o. female.    Chief Complaint: Callouses (C/o painful callous left 4th digit, pt rates pain 9/10, pt states she can barely walks , pt is diabetic, pt last seen pcp Erin Gary MD 1/31/2025)    Edie is a 89 y.o. female who presents to the clinic upon referral from Dr. Abi salazar. provider found  for evaluation and treatment of diabetic feet. Edie has a past medical history of Diabetes, Glaucoma, Hand arthritis, Heart murmur, HTN (hypertension), Hypothyroidism, Intracranial hemorrhage, Postmenopausal, Seasonal allergies, and Type 2 diabetes mellitus. Patient relates no major problem with feet. Only complaints today consist of painful left 4th toe callus. Patient rates pain 9/10. Patient points to tip of left 4th toe.     PCP: Erin Gary MD    Date Last Seen by PCP: 01/31/2025    Current shoe gear: Flats    Hemoglobin A1C   Date Value Ref Range Status   10/10/2024 5.6 4.0 - 5.6 % Final     Comment:     ADA Screening Guidelines:  5.7-6.4%  Consistent with prediabetes  >or=6.5%  Consistent with diabetes    High levels of fetal hemoglobin interfere with the HbA1C  assay. Heterozygous hemoglobin variants (HbS, HgC, etc)do  not significantly interfere with this assay.   However, presence of multiple variants may affect accuracy.     03/11/2024 5.2 4.0 - 5.6 % Final     Comment:     ADA Screening Guidelines:  5.7-6.4%  Consistent with prediabetes  >or=6.5%  Consistent with diabetes    High levels of fetal hemoglobin interfere with the HbA1C  assay. Heterozygous hemoglobin variants (HbS, HgC, etc)do  not significantly interfere with this assay.   However, presence of multiple variants may affect accuracy.     10/09/2023 6.1 (H) 4.0 - 5.6 % Final     Comment:     ADA Screening Guidelines:  5.7-6.4%  Consistent with prediabetes  >or=6.5%  Consistent with diabetes    High levels of fetal hemoglobin interfere with the HbA1C  assay. Heterozygous  hemoglobin variants (HbS, HgC, etc)do  not significantly interfere with this assay.   However, presence of multiple variants may affect accuracy.           Patient Active Problem List   Diagnosis    Hypertension associated with diabetes    Postmenopausal    Hypothyroidism    Cardiac murmur    Overweight (BMI 25.0-29.9)    Aortic stenosis    Type 2 diabetes mellitus without complication, without long-term current use of insulin    History of angioedema    Obstructive hypertrophic cardiomyopathy    Primary open angle glaucoma (POAG) of both eyes, severe stage    History of subdural hematoma    Bilateral hearing loss    Normocytic anemia    Stage 3b chronic kidney disease    Chronic deep vein thrombosis (DVT) of femoral vein of left lower extremity    Bradycardia    Iron deficiency anemia due to chronic blood loss    Diastolic congestive heart failure, unspecified HF chronicity    Stage 3a chronic kidney disease    Hypertensive heart disease without heart failure       Medication List with Changes/Refills   Current Medications    AMLODIPINE (NORVASC) 10 MG TABLET    Take 10 mg by mouth once daily.    BLOOD SUGAR DIAGNOSTIC (BLOOD GLUCOSE TEST) STRP    Use twice daily prn. Dx: E11.9    BLOOD-GLUCOSE METER KIT    Use as instructed - dx: E11.9    BUMETANIDE (BUMEX) 1 MG TABLET    Take 1 mg by mouth once daily.    CETIRIZINE (ZYRTEC) 10 MG TABLET    Take 10 mg by mouth once daily.     CLONIDINE (CATAPRES) 0.1 MG TABLET    Take 0.1 mg by mouth 2 (two) times daily.    DORZOLAMIDE-TIMOLOL 2-0.5% (COSOPT) 22.3-6.8 MG/ML OPHTHALMIC SOLUTION    1 drop.    EPINEPHRINE (EPIPEN) 0.3 MG/0.3 ML ATIN    Inject 0.3 mLs (0.3 mg total) into the muscle once. for 1 dose    FAMOTIDINE (PEPCID) 40 MG TABLET    Take 1 tablet (40 mg total) by mouth once daily.    HYDRALAZINE (APRESOLINE) 100 MG TABLET    TAKE ONE TABLET BY MOUTH THREE TIMES DAILY    JANUVIA 100 MG TAB    TAKE ONE TABLET BY MOUTH ONCE DAILY    LANCETS MISC    Use twice daily  prn. Dx: E11.9    LEVOTHYROXINE (SYNTHROID) 50 MCG TABLET    TAKE ONE TABLET BY MOUTH BEFORE BREAKFAST    LUMIGAN 0.01 % DROP    Place into both eyes.    MULTIVITAMIN WITH MINERALS TABLET    Take 1 tablet by mouth once daily.     NITROGLYCERIN (NITROSTAT) 0.4 MG SL TABLET    Place under the tongue.    NYSTATIN (MYCOSTATIN) CREAM    Apply topically 2 (two) times daily.   Discontinued Medications    EPINEPHRINE (EPIPEN) 0.3 MG/0.3 ML ATIN    INJECT 0.3MLS INTO THE MUSCLE ONCE FOR 1 DOSE    LEVOCETIRIZINE (XYZAL) 5 MG TABLET    Take 1 tablet (5 mg total) by mouth every evening.       Review of patient's allergies indicates:   Allergen Reactions    Amlodipine besylate Edema, Other (See Comments) and Swelling     Other reaction(s): swelling of feet    Brimonidine tartrate Itching and Swelling    Iodinated contrast media Other (See Comments)    Losartan Anaphylaxis, Swelling and Other (See Comments)    Metformin hcl Diarrhea       Past Surgical History:   Procedure Laterality Date    bladder lift      BREAST BIOPSY      BUNIONECTOMY Bilateral     1970s    CATARACT EXTRACTION Bilateral     1999    CHOLECYSTECTOMY      CRANIOTOMY  2017    s/p fall    FOOT NEUROMA SURGERY Right 1982    R foot - neuroma removed    MYOMECTOMY      TOTAL ABDOMINAL HYSTERECTOMY W/ BILATERAL SALPINGOOPHORECTOMY      due to fibroid       Family History   Problem Relation Name Age of Onset    No Known Problems Son      Heart attack Mother      Diabetes Sister      Hypertension Sister      Hypertension Sister      Breast cancer Maternal Aunt      Stroke Neg Hx         Social History     Socioeconomic History    Marital status:    Occupational History    Occupation: Retired    Tobacco Use    Smoking status: Never     Passive exposure: Never    Smokeless tobacco: Never   Substance and Sexual Activity    Alcohol use: Not Currently    Drug use: Never   Social History Narrative    She wears seatbelt.     Social Drivers of Health  "    Financial Resource Strain: Low Risk  (1/18/2025)    [facility]    Overall Financial Resource Strain (CARDIA)     Difficulty of Paying Living Expenses: Not hard at all   Food Insecurity: No Food Insecurity (1/18/2025)    [facility]    Hunger Vital Sign     Worried About Running Out of Food in the Last Year: Never true     Ran Out of Food in the Last Year: Never true   Transportation Needs: No Transportation Needs (1/18/2025)    [facility]    PRAPARE - Transportation     Lack of Transportation (Medical): No     Lack of Transportation (Non-Medical): No   Physical Activity: Insufficiently Active (10/10/2024)    Exercise Vital Sign     Days of Exercise per Week: 7 days     Minutes of Exercise per Session: 10 min   Stress: No Stress Concern Present (3/8/2024)    [employer] - Occupational Stress Questionnaire     Feeling of Stress : Not at all   Housing Stability: Unknown (1/18/2025)    [facility]    Housing Stability Vital Sign     Unable to Pay for Housing in the Last Year: No     Number of Times Moved in the Last Year: 1       Vitals:    02/13/25 1110   Weight: 52.6 kg (115 lb 15.4 oz)   Height: 4' 11" (1.499 m)   PainSc:   9       Hemoglobin A1C   Date Value Ref Range Status   10/10/2024 5.6 4.0 - 5.6 % Final     Comment:     ADA Screening Guidelines:  5.7-6.4%  Consistent with prediabetes  >or=6.5%  Consistent with diabetes    High levels of fetal hemoglobin interfere with the HbA1C  assay. Heterozygous hemoglobin variants (HbS, HgC, etc)do  not significantly interfere with this assay.   However, presence of multiple variants may " affect accuracy.     03/11/2024 5.2 4.0 - 5.6 % Final     Comment:     ADA Screening Guidelines:  5.7-6.4%  Consistent with prediabetes  >or=6.5%  Consistent with diabetes    High levels of fetal hemoglobin interfere with the HbA1C  assay. Heterozygous hemoglobin variants (HbS, HgC, etc)do  not significantly interfere with this assay.   However, presence of multiple variants may affect accuracy.     10/09/2023 6.1 (H) 4.0 - 5.6 % Final     Comment:     ADA Screening Guidelines:  5.7-6.4%  Consistent with prediabetes  >or=6.5%  Consistent with diabetes    High levels of fetal hemoglobin interfere with the HbA1C  assay. Heterozygous hemoglobin variants (HbS, HgC, etc)do  not significantly interfere with this assay.   However, presence of multiple variants may affect accuracy.         Review of Systems   Constitutional:  Negative for chills and fever.   Respiratory:  Negative for shortness of breath.    Cardiovascular:  Negative for chest pain, palpitations, orthopnea, claudication and leg swelling.   Gastrointestinal:  Negative for diarrhea, nausea and vomiting.   Musculoskeletal:  Negative for joint pain.   Skin:  Negative for rash.   Neurological:  Positive for tingling. Negative for dizziness, sensory change, focal weakness and weakness.   Psychiatric/Behavioral: Negative.               Objective:      PHYSICAL EXAM: Apperance: Alert and orient in no distress,well developed, and with good attention to grooming and body habits  Patient presents ambulating in flats.   LOWER EXTREMITY EXAM:  VASCULAR: Dorsalis pedis pulses 1/4 bilateral and Posterior Tibial pulses 1/4 bilateral. Capillary fill time <blanched bilateral. Mild edema observed bilateral. Varicosities present bilateral. Skin temperature of the lower extremities is warm to warm, proximal to distal. Hair growth dim bilateral.  DERMATOLOGICAL: No skin rashes, subcutaneous nodules, lesions, or ulcers observed bilateral. Nails 1,2,3,4,5 bilateral normal length.  Webspaces 1,2,3,4 bilateral clean, dry and without evidence of break in skin integrity. Mild hyperkeratotic tissue noted to right plantar hallux, left distal 2nd and 4th toes, and left dorsal 4th toe   NEUROLOGICAL: Light touch, sharp-dull, proprioception all present and equal bilaterally.  Vibratory sensation diminished at bilateral phalanx. Protective sensation decreased sites as tested with a [place] 5.07 monofilament.   MUSCULOSKELETAL: Muscle strength is 5/5 for foot inverters, everters, plantarflexors, and dorsiflexors. Muscle tone is normal.       Assessment:       ICD-10-CM ICD-9-CM   1. Chronic toe pain, right foot - Right Foot  M79.674 729.5    G89.29 338.29   2. Pre-ulcerative calluses - Right Foot  L84 700   3. Type 2 diabetes mellitus without complication, without long-term current use of insulin  E11.9 250.00           Plan:   Chronic toe pain, right foot - Right Foot    Pre-ulcerative calluses - Right Foot    Type 2 diabetes mellitus without complication, without long-term current use of insulin      I counseled the patient on her conditions, regarding findings of my examination, my impressions, and usual treatment plan.   This visit spent on counseling and coordination of care.  Appointment spent on education about the diabetic foot, neuropathy, and prevention of limb loss.  Shoe inspection. Diabetic Foot Education. Patient reminded of the importance of good nutrition and blood sugar control to help prevent podiatric complications of diabetes. Patient instructed on proper foot hygeine. We discussed wearing proper shoe gear, daily foot inspections, never walking without protective shoe gear, never putting sharp instruments to feet.    Dispensed toe lifts to be worn in shoes yanelis.   Discussed surgical and conservative management of hammertoe deformity. Conservatively we did discuss padding, and shoe modifications such as softer shoes with wide toe boxes. Surgically we briefly discussed pre and  post operative expectations. The patient elects for conservative management at this time   The patient and I reviewed the types of shoes she should be wearing, my recommendation includes generally the best time of the day for a shoe fitting is the afternoon, shoes with a wide toe box, very good cushion, and tennis shoes with removable inner soles.The patient and I reviewed my recommendations for over-the-counter orthotic inserts.   Patient  will continue to monitor the areas daily, inspect feet, wear protective shoe gear when ambulatory, moisturizer to maintain skin integrity. Patient reminded of the importance of good nutrition and blood sugar control to help prevent podiatric complications of diabetes.  Patient to return 12 months or sooner if needed.          Sheryl Hoskins DPM  [facility]

## 2025-02-17 ENCOUNTER — TELEPHONE (OUTPATIENT)
Dept: ALLERGY | Facility: CLINIC | Age: OVER 89
End: 2025-02-17
Payer: MEDICARE

## 2025-02-17 NOTE — TELEPHONE ENCOUNTER
----- Message from Ayesha sent at 2/14/2025 12:48 PM CST -----  Name of Who is Calling:ZAK MONTES [[id number]]        What is the request in detail: pt is calling in about lab test and needs to speak to the nurse and give her a reason why please advise thank you        Can the clinic reply by [facility]:call back         What Number to Call Back if not in [facility] Telephone Information:  [employer]          [phone]

## 2025-02-27 ENCOUNTER — PATIENT OUTREACH (OUTPATIENT)
Dept: ADMINISTRATIVE | Facility: CLINIC | Age: OVER 89
End: 2025-02-27
Payer: MEDICARE

## 2025-02-27 NOTE — PROGRESS NOTES
No outreaches to be attempted for this encounter only as the patient is non-applicable for a [facility] post-discharge follow up call due to the patient stating she has been readmitted to this hospital.

## 2025-03-06 ENCOUNTER — OFFICE VISIT (OUTPATIENT)
Dept: FAMILY MEDICINE | Facility: CLINIC | Age: OVER 89
End: 2025-03-06
Attending: FAMILY MEDICINE
Payer: MEDICARE

## 2025-03-06 ENCOUNTER — LAB VISIT (OUTPATIENT)
Dept: LAB | Facility: HOSPITAL | Age: OVER 89
End: 2025-03-06
Attending: INTERNAL MEDICINE
Payer: MEDICARE

## 2025-03-06 VITALS
TEMPERATURE: 98 F | HEART RATE: 60 BPM | SYSTOLIC BLOOD PRESSURE: 148 MMHG | WEIGHT: 118.63 LBS | OXYGEN SATURATION: 97 % | HEIGHT: 59 IN | BODY MASS INDEX: 23.92 KG/M2 | DIASTOLIC BLOOD PRESSURE: 70 MMHG

## 2025-03-06 DIAGNOSIS — E11.59 HYPERTENSION ASSOCIATED WITH DIABETES: ICD-10-CM

## 2025-03-06 DIAGNOSIS — E11.9 TYPE 2 DIABETES MELLITUS WITHOUT COMPLICATION, WITHOUT LONG-TERM CURRENT USE OF INSULIN: ICD-10-CM

## 2025-03-06 DIAGNOSIS — N28.9 RENAL INSUFFICIENCY: ICD-10-CM

## 2025-03-06 DIAGNOSIS — I50.30 DIASTOLIC CONGESTIVE HEART FAILURE, UNSPECIFIED HF CHRONICITY: Primary | ICD-10-CM

## 2025-03-06 DIAGNOSIS — I15.2 HYPERTENSION ASSOCIATED WITH DIABETES: ICD-10-CM

## 2025-03-06 LAB
ALBUMIN SERPL BCP-MCNC: 3.4 G/DL (ref 3.5–5.2)
ANION GAP SERPL CALC-SCNC: 12 MMOL/L (ref 8–16)
BUN SERPL-MCNC: 35 MG/DL (ref 10–30)
CALCIUM SERPL-MCNC: 9.3 MG/DL (ref 8.7–10.5)
CHLORIDE SERPL-SCNC: 98 MMOL/L (ref 95–110)
CO2 SERPL-SCNC: 24 MMOL/L (ref 23–29)
CREAT SERPL-MCNC: 1.6 MG/DL (ref 0.5–1.4)
EST. GFR  (NO RACE VARIABLE): 29.7 ML/MIN/1.73 M^2
GLUCOSE SERPL-MCNC: 93 MG/DL (ref 70–110)
PHOSPHATE SERPL-MCNC: 3.9 MG/DL (ref 2.7–4.5)
POTASSIUM SERPL-SCNC: 3.6 MMOL/L (ref 3.5–5.1)
SODIUM SERPL-SCNC: 134 MMOL/L (ref 136–145)

## 2025-03-06 PROCEDURE — 99999 PR PBB SHADOW E&M-EST. PATIENT-LVL V: CPT | Mod: PBBFAC,,, | Performed by: FAMILY MEDICINE

## 2025-03-06 PROCEDURE — 80069 RENAL FUNCTION PANEL: CPT | Performed by: INTERNAL MEDICINE

## 2025-03-06 NOTE — PROGRESS NOTES
Edie Hammond    Chief Complaint   Patient presents with    Hospital Follow Up       History of Present Illness:   Transitional Care Note    Family and/or Caretaker present at visit?  No.  Diagnostic tests reviewed/disposition: No diagnosic tests pending after this hospitalization.  Disease/illness education: Discussed with patient.  Home health/community services discussion/referrals: Patient has home health established at [facility] (with ongoing services even prior to hospitalization).   Establishment or re-establishment of referral orders for community resources: No other necessary community resources.   Discussion with other health care providers: No discussion with other health care providers necessary.     Ms. Hammond comes in today for hospital follow-up.  She states her friend brought her today but sits in the lobby during visit today.  She states she is not fasting but has taken medications today.  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  She was discharged home to follow-up with PCP and with Cardiology.  However, she states on last week she has shortness of breath, called EMS, and was taken to cardiologist Dr. Milton's office for further evaluation at which time her blood pressure was elevated and she was sent to [facility] ER for further evaluation.  She was hospitalized February 26, 2025 through February 28, 2025 and was discharged once blood pressure improved.  She was told to follow-up with PCP and to follow-up with cardiology.  She has follow-up scheduled with cardiology MEGA Smith on March 11, 2025.    She states she had home health services with [facility] before hospitalizations.  She states she continues to receive nurse, physical therapy, and occupational therapy services 2 times per week.    She states she  feels okay today but admits stamina is not back to 100%.  She states she walks at home without problems (and with occasionally using cane).    She states she performs home blood pressure checks and states systolic blood pressure readings are 160's today.  She states she performs home glucose checks with level of 108 on yesterday and 117 today.  She states she continues to take hydralazine 100 mg 3 times daily, clonidine 0.1 mg twice daily, amlodipine 10 mg daily, Bumex 1 mg daily for blood pressure control; Januvia 100 mg daily for diabetes control; Synthroid 50 mcg daily for hypothyroidism; famotidine 40 mg daily for dyspepsia.    Otherwise, she denies having fever, chills, fatigue, appetite changes; shortness of breath, cough, wheezing; chest pain, palpitations, leg swelling; abdominal pain, nausea, vomiting, diarrhea, constipation; unusual urinary symptoms; polydipsia, polyphagia, polyuria, hot or cold intolerance; back pain; acute visual changes, numbness, headache; anxiety, depression, homicidal or suicidal thoughts.              Labs:                  WBC                      12.89 (H)           11/15/2023                 HGB                      8.7 (L)             11/15/2023                 HCT                      25.9 (L)            11/15/2023                 PLT                      424                 11/15/2023                 CHOL                     242 (H)             10/10/2024                 TRIG                     137                 10/10/2024                 HDL                      71                  10/10/2024                 ALT                      8 (L)               10/10/2024                 AST                      24                  10/10/2024                 NA                       133 (L)             01/20/2025                 K                        4.3                 01/20/2025                 CL                       100                 01/20/2025                 CREATININE                2.04 (H)            01/20/2025                 BUN                      42 (H)              01/20/2025                 CO2                      23                  01/20/2025                 TSH                      4.917 (H)           10/10/2024                 HGBA1C                   5.6                 10/10/2024               LDLCALC                  143.6               10/10/2024              CALCIUM                  8.7 (L)             01/20/2025                 ANIONGAP                 10                  01/20/2025                 EGFRNORACEVR             38 (A)              09/03/2024                    Current Outpatient Medications   Medication Sig    amLODIPine (NORVASC) 10 MG tablet Take 10 mg by mouth once daily.    blood sugar diagnostic (BLOOD GLUCOSE TEST) Strp Use twice daily prn. Dx: E11.9    bumetanide (BUMEX) 1 MG tablet Take 1 mg by mouth once daily.    cetirizine (ZYRTEC) 10 MG tablet Take 10 mg by mouth once daily.     cloNIDine (CATAPRES) 0.1 MG tablet Take 0.1 mg by mouth 2 (two) times daily.    dorzolamide-timolol 2-0.5% (COSOPT) 22.3-6.8 mg/mL ophthalmic solution 1 drop.    famotidine (PEPCID) 40 MG tablet Take 1 tablet (40 mg total) by mouth once daily.    hydrALAZINE (APRESOLINE) 100 MG tablet TAKE ONE TABLET BY MOUTH THREE TIMES DAILY    JANUVIA 100 mg Tab TAKE ONE TABLET BY MOUTH ONCE DAILY    lancets Misc Use twice daily prn. Dx: E11.9    levothyroxine (SYNTHROID) 50 MCG tablet TAKE ONE TABLET BY MOUTH BEFORE BREAKFAST    LUMIGAN 0.01 % Drop Place into both eyes.    multivitamin with minerals tablet Take 1 tablet by mouth once daily.     nitroGLYCERIN (NITROSTAT) 0.4 MG SL tablet Place under the tongue.    blood-glucose meter kit Use as instructed - dx: E11.9       Review of Systems   Constitutional:  Negative for activity change, appetite change, chills, fatigue and fever.         Weight 52.6 kg (115 lb 15.4 oz) at January 31, 2024 visit.        Eyes:  Negative for  visual disturbance.   Respiratory:  Negative for cough, shortness of breath and wheezing.    Cardiovascular:  Negative for chest pain, palpitations and leg swelling.        See history of present illness.   Gastrointestinal:  Negative for abdominal pain, constipation, diarrhea, nausea and vomiting.   Endocrine: Negative for cold intolerance, heat intolerance, polydipsia, polyphagia and polyuria.        See history of present illness.   Genitourinary:  Negative for difficulty urinating.   Musculoskeletal:  Negative for back pain.   Neurological:  Negative for numbness and headaches.   Psychiatric/Behavioral:  Negative for dysphoric mood and suicidal ideas. The patient is not nervous/anxious.         Negative for homicidal ideas.       Objective:  Physical Exam  Vitals and nursing note reviewed.   Constitutional:       General: She is not in acute distress.     Appearance: Normal appearance. She is well-developed. She is not ill-appearing, toxic-appearing or diaphoretic.   HENT:      Ears:      Comments: Bilateral hearing aids.  Cardiovascular:      Rate and Rhythm: Normal rate and regular rhythm.      Pulses:           Dorsalis pedis pulses are 3+ on the right side and 3+ on the left side.      Heart sounds: Murmur heard.      No gallop.   Pulmonary:      Effort: Pulmonary effort is normal.      Breath sounds: Normal breath sounds.   Abdominal:      General: Bowel sounds are normal. There is no distension.      Palpations: Abdomen is soft. There is no mass.      Tenderness: There is no abdominal tenderness. There is no guarding or rebound.      Hernia: A hernia is present.      Comments: Non tender ventral hernia.   Musculoskeletal:         General: Swelling present. No tenderness. Normal range of motion.      Cervical back: Normal range of motion and neck supple. No tenderness.      Comments: She sits in the wheelchair during visit today. She moves all extremities with stiffness noted. Subtle swelling at top of left  foot.   Feet:      Right foot:      Protective Sensation: 5 sites tested.  5 sites sensed.      Skin integrity: No ulcer or skin breakdown.      Left foot:      Protective Sensation: 5 sites tested.  5 sites sensed.      Skin integrity: No ulcer or skin breakdown.   Lymphadenopathy:      Cervical: No cervical adenopathy.   Skin:     General: Skin is warm and dry.   Neurological:      Mental Status: She is alert.      Motor: No abnormal muscle tone.   Psychiatric:         Mood and Affect: Mood normal.         Speech: Speech normal.         Behavior: Behavior normal.         Thought Content: Thought content normal.         Judgment: Judgment normal.         ASSESSMENT:  1. Diastolic congestive heart failure, unspecified HF chronicity    2. Type 2 diabetes mellitus without complication, without long-term current use of insulin    3. Hypertension associated with diabetes        PLAN:  Edie was seen today for hospital follow up.    Diagnoses and all orders for this visit:    Diastolic congestive heart failure, unspecified HF chronicity    Type 2 diabetes mellitus without complication, without long-term current use of insulin  -     Hemoglobin A1C; Future    Hypertension associated with diabetes      Patient advised to call for results.  Continue current medications, follow low sodium, low cholesterol, low carb diet, daily walks.  Keep follow up with specialists.  Follow up in about 7 months (around 10/10/2025) for physical.

## 2025-03-07 ENCOUNTER — LAB VISIT (OUTPATIENT)
Dept: LAB | Facility: HOSPITAL | Age: OVER 89
End: 2025-03-07
Attending: INTERNAL MEDICINE
Payer: MEDICARE

## 2025-03-07 DIAGNOSIS — R80.1 PERSISTENT PROTEINURIA: ICD-10-CM

## 2025-03-07 DIAGNOSIS — I10 PRIMARY HYPERTENSION: ICD-10-CM

## 2025-03-07 DIAGNOSIS — N18.32 STAGE 3B CHRONIC KIDNEY DISEASE: ICD-10-CM

## 2025-03-07 PROCEDURE — 84156 ASSAY OF PROTEIN URINE: CPT | Performed by: INTERNAL MEDICINE

## 2025-03-08 LAB
CREAT UR-MCNC: 24 MG/DL (ref 15–325)
PROT UR-MCNC: 99 MG/DL (ref 0–15)
PROT/CREAT UR: 4.13 MG/G{CREAT} (ref 0–0.2)

## 2025-03-10 ENCOUNTER — OFFICE VISIT (OUTPATIENT)
Dept: NEPHROLOGY | Facility: CLINIC | Age: OVER 89
End: 2025-03-10
Payer: MEDICARE

## 2025-03-10 VITALS
HEIGHT: 59 IN | RESPIRATION RATE: 18 BRPM | DIASTOLIC BLOOD PRESSURE: 60 MMHG | SYSTOLIC BLOOD PRESSURE: 144 MMHG | BODY MASS INDEX: 23.92 KG/M2 | HEART RATE: 104 BPM | WEIGHT: 118.63 LBS

## 2025-03-10 DIAGNOSIS — N17.9 AKI (ACUTE KIDNEY INJURY): Primary | ICD-10-CM

## 2025-03-10 DIAGNOSIS — I10 PRIMARY HYPERTENSION: ICD-10-CM

## 2025-03-10 DIAGNOSIS — N18.32 STAGE 3B CHRONIC KIDNEY DISEASE: ICD-10-CM

## 2025-03-10 DIAGNOSIS — R80.1 PERSISTENT PROTEINURIA: ICD-10-CM

## 2025-03-10 PROCEDURE — 1125F AMNT PAIN NOTED PAIN PRSNT: CPT | Mod: CPTII,S$GLB,, | Performed by: INTERNAL MEDICINE

## 2025-03-10 PROCEDURE — 99999 PR PBB SHADOW E&M-EST. PATIENT-LVL IV: CPT | Mod: PBBFAC,,, | Performed by: INTERNAL MEDICINE

## 2025-03-10 PROCEDURE — 3288F FALL RISK ASSESSMENT DOCD: CPT | Mod: CPTII,S$GLB,, | Performed by: INTERNAL MEDICINE

## 2025-03-10 PROCEDURE — 1159F MED LIST DOCD IN RCRD: CPT | Mod: CPTII,S$GLB,, | Performed by: INTERNAL MEDICINE

## 2025-03-10 PROCEDURE — 1101F PT FALLS ASSESS-DOCD LE1/YR: CPT | Mod: CPTII,S$GLB,, | Performed by: INTERNAL MEDICINE

## 2025-03-10 PROCEDURE — 99214 OFFICE O/P EST MOD 30 MIN: CPT | Mod: S$GLB,,, | Performed by: INTERNAL MEDICINE

## 2025-03-10 PROCEDURE — 1160F RVW MEDS BY RX/DR IN RCRD: CPT | Mod: CPTII,S$GLB,, | Performed by: INTERNAL MEDICINE

## 2025-03-10 NOTE — PROGRESS NOTES
"Subjective:       Patient ID: Edie Hammond is a 89 y.o. female.    Chief Complaint:  CKD, hypertension    HPI    She presents to clinic today for routine follow-up.  Since her last office visit she has been doing well and has no specific or new complaints.  She relates that she had her aortic valve replaced via TAVR procedure since her last office visit.  She states she has done well since the procedure.  Overall she feels better.  Her laboratory studies and medications were reviewed.  All Nephrology related questions were answered to her satisfaction.    Review of Systems   Constitutional: Negative.    HENT: Negative.     Respiratory: Negative.     Cardiovascular: Negative.    Gastrointestinal: Negative.    Genitourinary: Negative.    Musculoskeletal: Negative.    Skin: Negative.        BP (!) 144/60   Pulse 104   Resp 18   Ht 4' 11" (1.499 m)   Wt 53.8 kg (118 lb 9.7 oz)   BMI 23.96 kg/m²     Lab Results   Component Value Date    WBC 12.35 03/06/2025    HGB 8.8 (L) 03/06/2025    HCT 27.8 (L) 03/06/2025    MCV 94 03/06/2025     03/06/2025      BMP  Lab Results   Component Value Date     (L) 03/06/2025     (L) 03/06/2025    K 3.8 03/06/2025    K 3.6 03/06/2025    CL 97 03/06/2025    CL 98 03/06/2025    CO2 24 03/06/2025    CO2 24 03/06/2025    BUN 36 (H) 03/06/2025    BUN 35 (H) 03/06/2025    CREATININE 1.5 (H) 03/06/2025    CREATININE 1.6 (H) 03/06/2025    CALCIUM 9.7 03/06/2025    CALCIUM 9.3 03/06/2025    ANIONGAP 13 03/06/2025    ANIONGAP 12 03/06/2025    ESTGFRAFRICA 22 01/20/2025    EGFRNONAA >60.0 06/28/2022     CMP  Sodium   Date Value Ref Range Status   03/06/2025 134 (L) 136 - 145 mmol/L Final   03/06/2025 134 (L) 136 - 145 mmol/L Final     Potassium   Date Value Ref Range Status   03/06/2025 3.8 3.5 - 5.1 mmol/L Final   03/06/2025 3.6 3.5 - 5.1 mmol/L Final     Chloride   Date Value Ref Range Status   03/06/2025 97 95 - 110 mmol/L Final   03/06/2025 98 95 - 110 mmol/L " Final     CO2   Date Value Ref Range Status   03/06/2025 24 23 - 29 mmol/L Final   03/06/2025 24 23 - 29 mmol/L Final     Glucose   Date Value Ref Range Status   03/06/2025 83 70 - 110 mg/dL Final   03/06/2025 93 70 - 110 mg/dL Final     BUN   Date Value Ref Range Status   03/06/2025 36 (H) 10 - 30 mg/dL Final   03/06/2025 35 (H) 10 - 30 mg/dL Final     Creatinine   Date Value Ref Range Status   03/06/2025 1.5 (H) 0.5 - 1.4 mg/dL Final   03/06/2025 1.6 (H) 0.5 - 1.4 mg/dL Final     Calcium   Date Value Ref Range Status   03/06/2025 9.7 8.7 - 10.5 mg/dL Final   03/06/2025 9.3 8.7 - 10.5 mg/dL Final     Total Protein   Date Value Ref Range Status   03/06/2025 7.0 6.0 - 8.4 g/dL Final     Albumin   Date Value Ref Range Status   03/06/2025 3.3 (L) 3.5 - 5.2 g/dL Final   03/06/2025 3.4 (L) 3.5 - 5.2 g/dL Final     Total Bilirubin   Date Value Ref Range Status   03/06/2025 0.2 0.1 - 1.0 mg/dL Final     Comment:     For infants and newborns, interpretation of results should be based  on gestational age, weight and in agreement with clinical  observations.    Premature Infant recommended reference ranges:  Up to 24 hours.............<8.0 mg/dL  Up to 48 hours............<12.0 mg/dL  3-5 days..................<15.0 mg/dL  6-29 days.................<15.0 mg/dL       Alkaline Phosphatase   Date Value Ref Range Status   03/06/2025 67 40 - 150 U/L Final     AST   Date Value Ref Range Status   03/06/2025 64 (H) 10 - 40 U/L Final     ALT   Date Value Ref Range Status   03/06/2025 6 (L) 10 - 44 U/L Final     Anion Gap   Date Value Ref Range Status   03/06/2025 13 8 - 16 mmol/L Final   03/06/2025 12 8 - 16 mmol/L Final     eGFR if    Date Value Ref Range Status   06/28/2022 >60.0 >60 mL/min/1.73 m^2 Final     eGFR    Date Value Ref Range Status   01/20/2025 22 mL/min/1.73mSq Final     Comment:     In accordance with NKF-ASN Task Force recommendation, calculation based on the Chronic Kidney Disease  Epidemiology Collaboration (CKD-EPI) equation without adjustment for race. eGFR adjusted for gender and age and calculated in ml/min/1.73mSquared. eGFR cannot be calculated if patient is under 18 years of age.     Reference Range:   >= 60 ml/min/1.73mSquared.     eGFR if non    Date Value Ref Range Status   06/28/2022 >60.0 >60 mL/min/1.73 m^2 Final     Comment:     Calculation used to obtain the estimated glomerular filtration  rate (eGFR) is the CKD-EPI equation.        Medications Ordered Prior to Encounter[1]         Objective:            Physical Exam  Constitutional:       Appearance: Normal appearance.   HENT:      Head: Normocephalic and atraumatic.   Eyes:      General: No scleral icterus.     Extraocular Movements: Extraocular movements intact.      Pupils: Pupils are equal, round, and reactive to light.   Pulmonary:      Effort: Pulmonary effort is normal.      Breath sounds: No stridor.   Musculoskeletal:      Right lower leg: No edema.      Left lower leg: No edema.   Skin:     General: Skin is warm and dry.   Neurological:      Mental Status: She is alert. Mental status is at baseline. She is disoriented.   Psychiatric:         Mood and Affect: Mood normal.         Behavior: Behavior normal.       Assessment:       1. CECILY (acute kidney injury)    2. Stage 3b chronic kidney disease    3. Primary hypertension    4. Persistent proteinuria        Plan:       1. She had a mildly creatinine around the time of her TAVR.  Creatinine peaked at around 2.  It has gradually improved over the past several months decreasing to 1.6 on most recent laboratory studies.    2. Her baseline creatinine prior to her TAVR was around 1.3.  Hopefully her creatinine will continue to improve over the next several months.  Even if it stays at around 1.6 this is an inconsequential increase in creatinine.    3. Blood pressure is adequately controlled on current regimen.    4. She has persistent proteinuria.  This is  however likely overestimated on PC ratio due to low urinary creatinine.      Wolf Zamarripa MD         [1]   Current Outpatient Medications on File Prior to Visit   Medication Sig Dispense Refill    amLODIPine (NORVASC) 10 MG tablet Take 10 mg by mouth once daily.      blood sugar diagnostic (BLOOD GLUCOSE TEST) Strp Use twice daily prn. Dx: E11.9 300 each 3    blood-glucose meter kit Use as instructed - dx: E11.9 1 each 0    bumetanide (BUMEX) 1 MG tablet Take 1 mg by mouth once daily.      cetirizine (ZYRTEC) 10 MG tablet Take 10 mg by mouth once daily.       cloNIDine (CATAPRES) 0.1 MG tablet Take 0.1 mg by mouth 2 (two) times daily.      dorzolamide-timolol 2-0.5% (COSOPT) 22.3-6.8 mg/mL ophthalmic solution 1 drop.      famotidine (PEPCID) 40 MG tablet Take 1 tablet (40 mg total) by mouth once daily. 30 tablet 11    hydrALAZINE (APRESOLINE) 100 MG tablet TAKE ONE TABLET BY MOUTH THREE TIMES DAILY 270 tablet 0    JANUVIA 100 mg Tab TAKE ONE TABLET BY MOUTH ONCE DAILY 90 tablet 1    lancets Misc Use twice daily prn. Dx: E11.9 300 each 3    levothyroxine (SYNTHROID) 50 MCG tablet TAKE ONE TABLET BY MOUTH BEFORE BREAKFAST 90 tablet 3    LUMIGAN 0.01 % Drop Place into both eyes.      multivitamin with minerals tablet Take 1 tablet by mouth once daily.       nitroGLYCERIN (NITROSTAT) 0.4 MG SL tablet Place under the tongue.       No current facility-administered medications on file prior to visit.

## 2025-03-17 ENCOUNTER — RESULTS FOLLOW-UP (OUTPATIENT)
Dept: FAMILY MEDICINE | Facility: CLINIC | Age: OVER 89
End: 2025-03-17

## 2025-03-19 ENCOUNTER — RESULTS FOLLOW-UP (OUTPATIENT)
Dept: ALLERGY | Facility: CLINIC | Age: OVER 89
End: 2025-03-19

## 2025-03-19 ENCOUNTER — TELEPHONE (OUTPATIENT)
Dept: ALLERGY | Facility: CLINIC | Age: OVER 89
End: 2025-03-19
Payer: MEDICARE

## 2025-03-20 ENCOUNTER — OFFICE VISIT (OUTPATIENT)
Dept: ALLERGY | Facility: CLINIC | Age: OVER 89
End: 2025-03-20
Payer: MEDICARE

## 2025-03-20 DIAGNOSIS — T50.8X5D ALLERGIC REACTION TO CONTRAST MATERIAL, SUBSEQUENT ENCOUNTER: ICD-10-CM

## 2025-03-20 DIAGNOSIS — Z88.9 MULTIPLE DRUG ALLERGIES: ICD-10-CM

## 2025-03-20 DIAGNOSIS — R89.9 ABNORMAL LABORATORY TEST RESULT: Primary | ICD-10-CM

## 2025-03-20 NOTE — PROGRESS NOTES
Audio Only Telehealth Visit     The patient location is: [place]   The chief complaint leading to consultation is: Follow up  Visit type: Virtual visit with audio only (telephone)  Total time spent in medical discussion with patient: 20 minutes  Total time spent on date of the encounter:30 minutes       The reason for the audio only service rather than synchronous audio and video virtual visit was related to technical difficulties or patient preference/necessity.       Each patient to whom I provide medical services by telemedicine is:  (1) informed of the relationship between the physician and patient and the respective role of any other health care provider with respect to management of the patient; and (2) notified that they may decline to receive medical services by telemedicine and may withdraw from such care at any time. Patient verbally consented to receive this service via voice-only telephone call.       HPI:  89 year old female  She denies symptoms, since she was seen here previous.  Ate fish without symptoms.  No history of symptoms with fish or shellfish. She was concerned due to her contrast dye allergy.     Assessment and plan:     Advised that she is at no greater risk than that of the general population to have a reaction to fish or shellfish.   Discussed abnormal labs.  Repeat labs ordered in addition tp Kit 816  Recommend avoidance of drugs that have caused symptoms.    RTC 4 weeks             Abnormal laboratory test result  -     Tryptase; Future; Expected date: 03/20/2025  -     HEPATIC FUNCTION PANEL; Future; Expected date: 03/20/2025  -     KIT [id number] Mutation Analysis, Blood; Future; Expected date: 03/20/2025    Allergic reaction to contrast material, subsequent encounter    Multiple drug allergies              This service was not originating from a related E/M service provided within the previous 7 days nor will  to an E/M service or procedure within the next 24 hours or my  soonest available appointment.  Prevailing standard of care was able to be met in this audio-only visit.

## 2025-03-25 ENCOUNTER — LAB VISIT (OUTPATIENT)
Dept: LAB | Facility: HOSPITAL | Age: OVER 89
End: 2025-03-25
Attending: ALLERGY & IMMUNOLOGY
Payer: MEDICARE

## 2025-03-25 DIAGNOSIS — N18.32 STAGE 3B CHRONIC KIDNEY DISEASE: ICD-10-CM

## 2025-03-25 DIAGNOSIS — R89.9 ABNORMAL LABORATORY TEST RESULT: ICD-10-CM

## 2025-03-25 PROCEDURE — 80076 HEPATIC FUNCTION PANEL: CPT

## 2025-03-25 PROCEDURE — 83520 IMMUNOASSAY QUANT NOS NONAB: CPT

## 2025-03-25 PROCEDURE — 36415 COLL VENOUS BLD VENIPUNCTURE: CPT | Mod: PO

## 2025-03-25 PROCEDURE — 82306 VITAMIN D 25 HYDROXY: CPT

## 2025-03-26 ENCOUNTER — RESULTS FOLLOW-UP (OUTPATIENT)
Dept: ALLERGY | Facility: CLINIC | Age: OVER 89
End: 2025-03-26

## 2025-03-26 LAB
25(OH)D3+25(OH)D2 SERPL-MCNC: 64 NG/ML (ref 30–96)
ALBUMIN SERPL BCP-MCNC: 3.2 G/DL (ref 3.5–5.2)
ALP SERPL-CCNC: 72 UNIT/L (ref 40–150)
ALT SERPL W/O P-5'-P-CCNC: 5 UNIT/L (ref 10–44)
AST SERPL-CCNC: 17 UNIT/L (ref 11–45)
BILIRUB DIRECT SERPL-MCNC: 0.1 MG/DL (ref 0.1–0.3)
BILIRUB SERPL-MCNC: 0.2 MG/DL (ref 0.1–1)
PROT SERPL-MCNC: 5.9 GM/DL (ref 6–8.4)

## 2025-03-27 ENCOUNTER — OFFICE VISIT (OUTPATIENT)
Dept: PODIATRY | Facility: CLINIC | Age: OVER 89
End: 2025-03-27
Payer: MEDICARE

## 2025-03-27 ENCOUNTER — PATIENT MESSAGE (OUTPATIENT)
Dept: PODIATRY | Facility: CLINIC | Age: OVER 89
End: 2025-03-27

## 2025-03-27 VITALS — WEIGHT: 118.63 LBS | BODY MASS INDEX: 23.92 KG/M2 | HEIGHT: 59 IN

## 2025-03-27 DIAGNOSIS — M79.675 PAIN IN LEFT TOE(S): ICD-10-CM

## 2025-03-27 DIAGNOSIS — R73.03 PREDIABETES: ICD-10-CM

## 2025-03-27 DIAGNOSIS — N18.4 CKD (CHRONIC KIDNEY DISEASE) STAGE 4, GFR 15-29 ML/MIN: ICD-10-CM

## 2025-03-27 DIAGNOSIS — M20.42 HAMMER TOE OF LEFT FOOT: Primary | ICD-10-CM

## 2025-03-27 LAB — TRYPTASE SERPL-MCNC: 11.6 NG/ML

## 2025-03-27 PROCEDURE — 1125F AMNT PAIN NOTED PAIN PRSNT: CPT | Mod: CPTII,S$GLB,, | Performed by: PODIATRIST

## 2025-03-27 PROCEDURE — 1101F PT FALLS ASSESS-DOCD LE1/YR: CPT | Mod: CPTII,S$GLB,, | Performed by: PODIATRIST

## 2025-03-27 PROCEDURE — 3288F FALL RISK ASSESSMENT DOCD: CPT | Mod: CPTII,S$GLB,, | Performed by: PODIATRIST

## 2025-03-27 PROCEDURE — 99999 PR PBB SHADOW E&M-EST. PATIENT-LVL III: CPT | Mod: PBBFAC,,, | Performed by: PODIATRIST

## 2025-03-27 PROCEDURE — 1160F RVW MEDS BY RX/DR IN RCRD: CPT | Mod: CPTII,S$GLB,, | Performed by: PODIATRIST

## 2025-03-27 PROCEDURE — 99213 OFFICE O/P EST LOW 20 MIN: CPT | Mod: S$GLB,,, | Performed by: PODIATRIST

## 2025-03-27 PROCEDURE — 1159F MED LIST DOCD IN RCRD: CPT | Mod: CPTII,S$GLB,, | Performed by: PODIATRIST

## 2025-03-27 NOTE — PROGRESS NOTES
Subjective:       Patient ID: Edie Hammond is a 89 y.o. female.    Chief Complaint: Callouses (Painful callus under 4th toe on left foot, rate pain 9/10, nondiabetic, pt is wearing tennis shoes)      HPI: Edie Hammond presents to the office today, with complaints of pains to the left foot. The pains are stated as moderate to severe at the 4th toe. Patient states limping with gait at times due to the pains. Pains are exacerbated by walking and standing. Sitting and limited WB does alleviate and/or decrease the pains. The patient does have hammer toe contractures. States alternation of shoe gear has not been helpful. Patient's Primary Care Provider is Erin Gary MD.     Review of patient's allergies indicates:   Allergen Reactions    Amlodipine besylate Edema, Other (See Comments) and Swelling     Other reaction(s): swelling of feet    Brimonidine tartrate Itching and Swelling    Iodinated contrast media Other (See Comments) and Swelling    Losartan Anaphylaxis, Swelling and Other (See Comments)    Cefazolin     Latanoprost     Metformin hcl Diarrhea       Past Medical History:   Diagnosis Date    Diabetes     Glaucoma     Follows with Dr. James Reeves, ophthalmologist    Hand arthritis     Heart murmur     Follows with Dr. Faisal Milton, cardiology    HTN (hypertension)     Hypothyroidism     Intracranial hemorrhage     Postmenopausal     Seasonal allergies     Type 2 diabetes mellitus        Family History   Problem Relation Name Age of Onset    No Known Problems Son      Heart attack Mother      Diabetes Sister      Hypertension Sister      Hypertension Sister      Breast cancer Maternal Aunt      Stroke Neg Hx         Social History[1]    Past Surgical History:   Procedure Laterality Date    AORTIC VALVE REPLACEMENT  02/20/2020    bladder lift      BREAST BIOPSY      BUNIONECTOMY Bilateral     1970s    CATARACT EXTRACTION Bilateral     1999    CHOLECYSTECTOMY      CRANIOTOMY  2017     "s/p fall    FOOT NEUROMA SURGERY Right 1982    R foot - neuroma removed    MYOMECTOMY      TOTAL ABDOMINAL HYSTERECTOMY W/ BILATERAL SALPINGOOPHORECTOMY      due to fibroid       Review of Systems   Constitutional:  Negative for chills, fatigue and fever.   HENT:  Negative for hearing loss.    Eyes:  Negative for photophobia and visual disturbance.   Respiratory:  Negative for cough, chest tightness, shortness of breath and wheezing.    Cardiovascular:  Negative for chest pain and palpitations.   Gastrointestinal:  Negative for constipation, diarrhea, nausea and vomiting.   Endocrine: Negative for cold intolerance and heat intolerance.   Genitourinary:  Negative for flank pain.   Musculoskeletal:  Positive for arthralgias and gait problem. Negative for neck pain and neck stiffness.   Neurological:  Negative for light-headedness and headaches.   Psychiatric/Behavioral:  Negative for sleep disturbance.          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    COMPARISON:  08/04/2023    FINDINGS:  Left thigh veins: Trace residual chronic DVT is seen in the periphery of the distal segment of the left femoral vein.  Clot burden may be slightly decreased from prior.  The common femoral,  popliteal, upper greater saphenous, and deep femoral veins are patent and free of thrombus. The veins are normally compressible and have normal phasic flow and augmentation response.    Left calf veins: The visualized calf veins are patent.    Miscellaneous: None  Impression: Persistent trace nonocclusive thrombus in the distal left femoral vein.    Electronically signed by: Valdo Manning, " MD  Date:    11/15/2023  Time:    12:18        Physical Exam    LOWER EXTREMITY PHYSICAL EXAMINATION    DERMATOLOGY: Skin is supple, dry and intact. Callus formation, LLE, 4th toe, distally.     ORTHOPEDIC: Manual Muscle Testing is 5/5 in all planes on the left foot, without pains, with and without resistance. Gait pattern is antalgic. There is moderate to severe semi-rigid hammer toe contracture to the left foot at the 4th toe. Associated metatarsalgia is noted.      Assessment:     1. Hammer toe of left foot    2. Pain in left toe(s)    3. CKD (chronic kidney disease) stage 4, GFR 15-29 ml/min    4. Prediabetes          Plan:         This patient does have hammertoe (digital) contractures. I did advise the patient to ambulate with shoe gear that is high in the tox box to allow for extra room and depth in the sagittal plane, in order to alleviate and lessen the potential for dorsal digital break down at the IPJs. I do also recommended shoe gear that is soft and supple in the foot bed as to lessen the potential for plantar distal digital break down at the contracted digits. If the patient does not feel the aforementioned is necessary, he or she may also purchase OTC padding devices to be worn across the MTPJ, at the distal aspects of the digits, and/or at the dorsal aspects of the IPJs. The patient does acknowledge understanding and is said to be amenable to compliance.         Future Appointments   Date Time Provider Department Center   10/6/2025 11:00 AM Erin Gary MD [facility] [address]            [1]   Social History  Socioeconomic History    Marital status:    Occupational History    Occupation: Retired    Tobacco Use    Smoking status: Never     Passive exposure: Never    Smokeless tobacco: Never   Substance and Sexual Activity    Alcohol use: Not Currently    Drug use: Never   Social History Narrative    She wears seatbelt.     Social Drivers of Health     Financial  Resource Strain: Low Risk  (1/18/2025)    [facility]    Overall Financial Resource Strain (CARDIA)     Difficulty of Paying Living Expenses: Not hard at all   Food Insecurity: No Food Insecurity (1/18/2025)    Received from [facility]idi[facility] and Affiliates    Hunger Vital Sign     Worried About Running Out of Food in the Last Year: Never true     Ran Out of Food in the Last Year: Never true   Transportation Needs: No Transportation Needs (1/18/2025)    [facility]    PRAPARE - Transportation     Lack of Transportation (Medical): No     Lack of Transportation (Non-Medical): No   Physical Activity: Insufficiently Active (10/10/2024)    Exercise Vital Sign     Days of Exercise per Week: 7 days     Minutes of Exercise per Session: 10 min   Stress: No Stress Concern Present (3/8/2024)    [employer] - Occupational Stress Questionnaire     Feeling of Stress : Not at all   Housing Stability: Unknown (1/18/2025)    Received from [facility] and [facility]ates    Housing Stability Vital Sign     Unable to Pay for Housing in the Last Year: No     Number of Times Moved in the Last Year: 1

## 2025-04-24 ENCOUNTER — DOCUMENT SCAN (OUTPATIENT)
Dept: HOME HEALTH SERVICES | Facility: HOSPITAL | Age: OVER 89
End: 2025-04-24
Payer: MEDICARE

## 2025-04-28 DIAGNOSIS — Z00.00 ENCOUNTER FOR MEDICARE ANNUAL WELLNESS EXAM: ICD-10-CM

## 2025-05-21 ENCOUNTER — OFFICE VISIT (OUTPATIENT)
Dept: FAMILY MEDICINE | Facility: CLINIC | Age: OVER 89
End: 2025-05-21
Payer: MEDICARE

## 2025-05-21 ENCOUNTER — EXTERNAL HOME HEALTH (OUTPATIENT)
Dept: HOME HEALTH SERVICES | Facility: HOSPITAL | Age: OVER 89
End: 2025-05-21
Payer: MEDICARE

## 2025-05-21 VITALS
SYSTOLIC BLOOD PRESSURE: 120 MMHG | DIASTOLIC BLOOD PRESSURE: 72 MMHG | RESPIRATION RATE: 18 BRPM | TEMPERATURE: 97 F | HEIGHT: 59 IN | BODY MASS INDEX: 23.42 KG/M2 | WEIGHT: 116.19 LBS | HEART RATE: 88 BPM

## 2025-05-21 DIAGNOSIS — E03.9 HYPOTHYROIDISM, UNSPECIFIED TYPE: ICD-10-CM

## 2025-05-21 DIAGNOSIS — I15.2 HYPERTENSION ASSOCIATED WITH DIABETES: ICD-10-CM

## 2025-05-21 DIAGNOSIS — D50.0 IRON DEFICIENCY ANEMIA DUE TO CHRONIC BLOOD LOSS: ICD-10-CM

## 2025-05-21 DIAGNOSIS — I50.30 DIASTOLIC CONGESTIVE HEART FAILURE, UNSPECIFIED HF CHRONICITY: ICD-10-CM

## 2025-05-21 DIAGNOSIS — Z99.89 DEPENDENCE ON OTHER ENABLING MACHINES AND DEVICES: ICD-10-CM

## 2025-05-21 DIAGNOSIS — I42.1 OBSTRUCTIVE HYPERTROPHIC CARDIOMYOPATHY: ICD-10-CM

## 2025-05-21 DIAGNOSIS — E11.9 TYPE 2 DIABETES MELLITUS WITHOUT COMPLICATION, WITHOUT LONG-TERM CURRENT USE OF INSULIN: ICD-10-CM

## 2025-05-21 DIAGNOSIS — N18.31 STAGE 3A CHRONIC KIDNEY DISEASE: ICD-10-CM

## 2025-05-21 DIAGNOSIS — Z00.00 ENCOUNTER FOR MEDICARE ANNUAL WELLNESS EXAM: Primary | ICD-10-CM

## 2025-05-21 DIAGNOSIS — E11.59 HYPERTENSION ASSOCIATED WITH DIABETES: ICD-10-CM

## 2025-05-21 DIAGNOSIS — H40.1133 PRIMARY OPEN ANGLE GLAUCOMA (POAG) OF BOTH EYES, SEVERE STAGE: ICD-10-CM

## 2025-05-21 DIAGNOSIS — I35.0 NONRHEUMATIC AORTIC VALVE STENOSIS: ICD-10-CM

## 2025-05-21 PROBLEM — I25.10 ARTERIOSCLEROSIS OF CORONARY ARTERY: Status: ACTIVE | Noted: 2025-02-06

## 2025-05-21 PROCEDURE — 99999 PR PBB SHADOW E&M-EST. PATIENT-LVL V: CPT | Mod: PBBFAC,,, | Performed by: NURSE PRACTITIONER

## 2025-05-21 NOTE — PROGRESS NOTES
"  Edie Hammond presented for a  Medicare AWV and comprehensive Health Risk Assessment today. The following components were reviewed and updated:    Medical history  Family History  Social history  Allergies and Current Medications  Health Risk Assessment  Health Maintenance  Care Team         ** See Completed Assessments for Annual Wellness Visit within the encounter summary.**         The following assessments were completed:  Living Situation  CAGE  Depression Screening  Timed Get Up and Go  Whisper Test  Cognitive Function Screening  Nutrition Screening  ADL Screening  PAQ Screening      Opioid documentation:      Patient does not have a current opioid prescription.        Vitals:    05/21/25 1412   BP: 120/72   Patient Position: Sitting   Pulse: 88   Resp: 18   Temp: 97 °F (36.1 °C)   Weight: 52.7 kg (116 lb 2.9 oz)   Height: 4' 11" (1.499 m)     Body mass index is 23.47 kg/m².  Physical Exam  Vitals and nursing note reviewed.   Constitutional:       Appearance: Normal appearance. She is well-developed.   HENT:      Head: Normocephalic and atraumatic.      Right Ear: Decreased hearing noted.      Left Ear: Decreased hearing noted.      Ears:      Comments: Hearing aides  Eyes:      Pupils: Pupils are equal, round, and reactive to light.   Neck:      Vascular: No carotid bruit.   Cardiovascular:      Rate and Rhythm: Normal rate and regular rhythm.      Pulses: Normal pulses.      Heart sounds: Murmur heard.      No gallop.   Pulmonary:      Effort: Pulmonary effort is normal.      Breath sounds: Normal breath sounds.   Abdominal:      General: Bowel sounds are normal. There is no distension.      Palpations: Abdomen is soft.      Tenderness: There is no abdominal tenderness.   Musculoskeletal:         General: No tenderness. Normal range of motion.   Skin:     General: Skin is warm and dry.   Neurological:      Mental Status: She is alert.      Motor: Weakness present. No abnormal muscle tone.      Gait: Gait " abnormal.      Comments: Use on rollator   Psychiatric:         Speech: Speech normal.         Behavior: Behavior normal.         Thought Content: Thought content normal.         Judgment: Judgment normal.     Current Outpatient Medications   Medication Instructions    amLODIPine (NORVASC) 10 mg, Daily    blood sugar diagnostic (BLOOD GLUCOSE TEST) Strp Use twice daily prn. Dx: E11.9    blood-glucose meter kit Use as instructed - dx: E11.9    bumetanide (BUMEX) 1 mg, Daily    cetirizine (ZYRTEC) 10 mg, Daily    cloNIDine (CATAPRES) 0.1 mg, 2 times daily    dorzolamide-timolol 2-0.5% (COSOPT) 22.3-6.8 mg/mL ophthalmic solution 1 drop    famotidine (PEPCID) 40 mg, Oral, Daily    hydrALAZINE (APRESOLINE) 100 MG tablet TAKE ONE TABLET BY MOUTH THREE TIMES DAILY    JANUVIA 100 mg Tab TAKE ONE TABLET BY MOUTH ONCE DAILY    lancets Misc Use twice daily prn. Dx: E11.9    levothyroxine (SYNTHROID) 50 mcg, Oral, Before breakfast    LUMIGAN 0.01 % Drop Place into both eyes.    multivitamin with minerals tablet 1 tablet, Daily    nitroGLYCERIN (NITROSTAT) 0.4 MG SL tablet Place under the tongue.               Diagnoses and health risks identified today and associated recommendations/orders:    1. Encounter for Medicare annual wellness exam   Referral to Enhanced Annual Wellness Visit (eAWV) W+1      Disussed shingles and Covid vaccine  Has urine leakage ever interrupted your daily activities or sleep? No  o you think you could use some help to better manage urine leakage?No      2. Non rheumatic aortic valve stenosis  Chronic and Ongoing-Bumex, Norvasc, Catapres and And Apresoline. Continue current treatment plan as previously prescribed with your PCP-card md- Dr. Milton  Pt states she will see  Dr. Milton soon - hospitalized 2/ 2025    3. Diastolic congestive heart failure, unspecified HF chronicity  Chronic and Stable on Bumex, Norvasc, Catapres and And Apresoline. Continue current treatment plan as previously prescribed  with your PCP-card md      4. Hypertension associated with diabetes  Chronic and Stable on Bumex, Norvasc, Catapres and And Apresoline. Continue current treatment plan as previously prescribed with your PCP-card md    5. Hypothyroidism, unspecified type  Chronic and Stable on Synthriod. Continue current treatment plan as previously prescribed with your PCP    6. Iron deficiency anemia due to chronic blood loss  Chronic and Stable.  Epoetin Ziggy OR Epoetin Ziggy-epbx (PROCRIT/RETACRrit as needed  for low  cbc  Continue current treatment plan as previously prescribed with your heme-     7. 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 Continue current treatment plan as previously prescribed with your opth    8. Stage 3 a chronic kidney disease  Chronic and Stable @  creatine improved 1.6. Continue current treatment plan as previously prescribed with your nephr    9 diabetes mellitus without complication, without long-term current use of insulin   Chronic and Stable on Phillip uva and diet. Continue current treatment plan as previously prescribed with your PCP    10 Obstructive hypertrophic cardiomyopathy   Chronic and Ongoing-Bumex, Norvasc, Catapres and And Apresoline. Continue current treatment plan as previously prescribed with your PCP-card md- Dr. Milton  Pt states she will see  Dr. Milton soon - hospitalized 2/ 2025      11. Dependence on other enabling machines and devices    Pt use rollotar daily- no recent falls    I offered to discuss advanced care planning, including how to pick a person who would make decisions for you if you were unable to make them for yourself, called a health care power of , and what kind of decisions you might make such as use of life sustaining treatments such as ventilators and tube feeding when faced with a life limiting illness recorded on a living will that they will need to know. (How you want to be cared for as  you near the end of your natural life)     X  Patient do to severe physical    Provided Edie with a 5-10 year written screening schedule and personal prevention plan. Recommendations were developed using the USPSTF age appropriate recommendations. Education, counseling, and referrals were provided as needed. After Visit Summary printed and given to patient which includes a list of additional screenings\tests needed.    Follow up in about 1 year (around 5/21/2026).    Marie Doan NP

## 2025-05-21 NOTE — PATIENT INSTRUCTIONS
Counseling and Referral of Other Preventative  (Italic type indicates deductible and co-insurance are waived)    Patient Name: Edie Hammond  Today's Date: 5/21/2025    Health Maintenance       Date Due Completion Date    TETANUS VACCINE Never done ---    Shingles Vaccine (2 of 3) 02/10/2016 12/16/2015    COVID-19 Vaccine (7 - 2024-25 season) 09/01/2024 9/22/2023    Diabetes Urine Screening 06/11/2025 6/11/2024    Hemoglobin A1c 09/06/2025 3/6/2025    Diabetic Eye Exam 09/25/2025 9/25/2024    Lipid Panel 10/10/2025 10/10/2024    Foot Exam 03/06/2026 3/6/2025    Override on 1/15/2025: Done    Override on 2/15/2023: Done    Override on 7/30/2020: Done        No orders of the defined types were placed in this encounter.    The following information is provided to all patients.  This information is to help you find resources for any of the problems found today that may be affecting your health:                  Living healthy guide: www.[facility].[place].[facility]      Understanding Diabetes: www.diabetes.org      Eating healthy: www.cdc.gov/healthyweight      CDC home safety checklist: www.cdc.gov/steadi/patient.html      Agency on Aging: www.goea.[place].[facility]      Alcoholics anonymous (AA): www.aa.org      Physical Activity: www.daniel.nih.gov/bs6nvkp      Tobacco use: www.quitwithusla.org

## 2025-06-11 ENCOUNTER — OFFICE VISIT (OUTPATIENT)
Dept: PODIATRY | Facility: CLINIC | Age: OVER 89
End: 2025-06-11
Payer: MEDICARE

## 2025-06-11 VITALS — HEIGHT: 59 IN | WEIGHT: 116.19 LBS | BODY MASS INDEX: 23.42 KG/M2

## 2025-06-11 DIAGNOSIS — N18.4 CKD (CHRONIC KIDNEY DISEASE) STAGE 4, GFR 15-29 ML/MIN: ICD-10-CM

## 2025-06-11 DIAGNOSIS — R73.03 PREDIABETES: ICD-10-CM

## 2025-06-11 DIAGNOSIS — M79.675 PAIN IN LEFT TOE(S): ICD-10-CM

## 2025-06-11 DIAGNOSIS — M20.42 HAMMER TOE OF LEFT FOOT: Primary | ICD-10-CM

## 2025-06-11 DIAGNOSIS — E11.9 TYPE 2 DIABETES MELLITUS WITHOUT COMPLICATION, WITHOUT LONG-TERM CURRENT USE OF INSULIN: ICD-10-CM

## 2025-06-11 PROCEDURE — 99999 PR PBB SHADOW E&M-EST. PATIENT-LVL III: CPT | Mod: PBBFAC,,, | Performed by: PODIATRIST

## 2025-06-11 NOTE — PROGRESS NOTES
Subjective:       Patient ID: Edie Hammond is a 89 y.o. female.    Chief Complaint: Callouses (Callus under toe, rate pain 10/10, nondiabetic, pt is wearing tennis shoes )      HPI: Edie Hammond presents to the office today, with complaints of pains to the left foot. The pains are stated as moderate to severe at the 4th toe. Patient states limping with gait at times due to the pains. Pains are exacerbated by walking and standing. Sitting and limited WB does alleviate and/or decrease the pains. The patient does have hammer toe contractures. States alternation of shoe gear has not been helpful. Patient's Primary Care Provider is Erin Gary MD.     Review of patient's allergies indicates:   Allergen Reactions    Amlodipine besylate Edema, Other (See Comments) and Swelling     Other reaction(s): swelling of feet    Brimonidine tartrate Itching and Swelling    Iodinated contrast media Other (See Comments) and Swelling    Losartan Anaphylaxis, Swelling and Other (See Comments)    Cefazolin     Latanoprost     Metformin hcl Diarrhea       Past Medical History:   Diagnosis Date    Diabetes     Glaucoma     Follows with Dr. James Reeves, ophthalmologist    Hand arthritis     Heart murmur     Follows with Dr. Faisal Milton, cardiology    HTN (hypertension)     Hypothyroidism     Intracranial hemorrhage     Postmenopausal     Seasonal allergies     Type 2 diabetes mellitus        Family History   Problem Relation Name Age of Onset    No Known Problems Son      Heart attack Mother      Diabetes Sister      Hypertension Sister      Hypertension Sister      Breast cancer Maternal Aunt      Stroke Neg Hx         Social History[1]    Past Surgical History:   Procedure Laterality Date    AORTIC VALVE REPLACEMENT      bladder lift      BREAST BIOPSY      BUNIONECTOMY Bilateral     1970s    CATARACT EXTRACTION Bilateral     1999    CHOLECYSTECTOMY      CRANIOTOMY  2017    s/p fall    FOOT NEUROMA SURGERY  "Right 1982    R foot - neuroma removed    MYOMECTOMY      TOTAL ABDOMINAL HYSTERECTOMY W/ BILATERAL SALPINGOOPHORECTOMY      due to fibroid       Review of Systems   Constitutional:  Negative for chills, fatigue and fever.   HENT:  Negative for hearing loss.    Eyes:  Negative for photophobia and visual disturbance.   Respiratory:  Negative for cough, chest tightness, shortness of breath and wheezing.    Cardiovascular:  Negative for chest pain and palpitations.   Gastrointestinal:  Negative for constipation, diarrhea, nausea and vomiting.   Endocrine: Negative for cold intolerance and heat intolerance.   Genitourinary:  Negative for flank pain.   Musculoskeletal:  Positive for arthralgias and gait problem. Negative for neck pain and neck stiffness.   Neurological:  Negative for light-headedness and headaches.   Psychiatric/Behavioral:  Negative for sleep disturbance.          Objective:   Ht 4' 11" (1.499 m)   Wt 52.7 kg (116 lb 2.9 oz)   BMI 23.47 kg/m²       Physical Exam    LOWER EXTREMITY PHYSICAL EXAMINATION    DERMATOLOGY: Skin is supple, dry and intact. Callus formation, LLE, 4th toe, distally and RLE 1st toe.     ORTHOPEDIC: Manual Muscle Testing is 5/5 in all planes on the left foot, without pains, with and without resistance. Gait pattern is antalgic. There is moderate to severe semi-rigid hammer toe contracture to the left foot at the 4th toe. Associated metatarsalgia is noted.      Assessment:     1. Hammer toe of left foot    2. Pain in left toe(s)    3. CKD (chronic kidney disease) stage 4, GFR 15-29 ml/min    4. Prediabetes    5. Type 2 diabetes mellitus without complication, without long-term current use of insulin            Plan:         This patient does have hammertoe (digital) contractures. I did advise the patient to ambulate with shoe gear that is high in the tox box to allow for extra room and depth in the sagittal plane, in order to alleviate and lessen the potential for dorsal digital " break down at the IPJs. I do also recommended shoe gear that is soft and supple in the foot bed as to lessen the potential for plantar distal digital break down at the contracted digits. If the patient does not feel the aforementioned is necessary, he or she may also purchase OTC padding devices to be worn across the MTPJ, at the distal aspects of the digits, and/or at the dorsal aspects of the IPJs. The patient does acknowledge understanding and is said to be amenable to compliance.         Future Appointments   Date Time Provider Department Center   10/6/2025 11:00 AM Erin Gary MD [facility] [address]              [1]   Social History  Socioeconomic History    Marital status:    Occupational History    Occupation: Retired    Tobacco Use    Smoking status: Never     Passive exposure: Never    Smokeless tobacco: Never   Substance and Sexual Activity    Alcohol use: Not Currently    Drug use: Never   Social History Narrative    She wears seatbelt.     Social Drivers of Health     Financial Resource Strain: Low Risk  (5/21/2025)    Overall Financial Resource Strain (CARDIA)     Difficulty of Paying Living Expenses: Not hard at all   Food Insecurity: No Food Insecurity (5/21/2025)    Hunger Vital Sign     Worried About Running Out of Food in the Last Year: Never true     Ran Out of Food in the Last Year: Never true   Transportation Needs: No Transportation Needs (5/21/2025)    PRAPARE - Transportation     Lack of Transportation (Medical): No     Lack of Transportation (Non-Medical): No   Physical Activity: Inactive (5/21/2025)    Exercise Vital Sign     Days of Exercise per Week: 0 days     Minutes of Exercise per Session: 0 min   Stress: No Stress Concern Present (5/21/2025)    [employer] - Occupational Stress Questionnaire     Feeling of Stress : Not at all   Housing Stability: Low Risk  (5/21/2025)    Housing Stability Vital Sign     Unable to Pay  for Housing in the Last Year: No     Number of Times Moved in the Last Year: 0     Homeless in the Last Year: No

## 2025-07-09 ENCOUNTER — TELEPHONE (OUTPATIENT)
Dept: FAMILY MEDICINE | Facility: CLINIC | Age: OVER 89
End: 2025-07-09
Payer: MEDICARE

## 2025-07-09 NOTE — TELEPHONE ENCOUNTER
Pt called in wanting to let us know why she had to cancel her appt. Checked and she had already been rebooked. Pt said she will try to get a friend to bring her instead of uber bc of what happened today. Confirmed appt with pt for next day.

## 2025-07-09 NOTE — TELEPHONE ENCOUNTER
Copied from CRM #[id number]. Topic: General Inquiry - Patient Update  >> Jul 9, 2025  1:54 PM Alanna wrote:  Type : Patient Call        Who Called :ZAK MONTES [[id number]]        Does the patient know what this is regarding?:Patient called and stated that she would like to leave  a courtesy message in regard to her missed appointment on today Wednesday 07/09/25 as she stated that her transportation did not show up due to a wrong pin code. Please follow up. Patient would like to receive a call regarding this.Thanks!!        Would the patient rather a call back or a response via My [facility]?call back          Best Call Back Number:[phone]

## 2025-07-10 ENCOUNTER — OFFICE VISIT (OUTPATIENT)
Dept: FAMILY MEDICINE | Facility: CLINIC | Age: OVER 89
End: 2025-07-10
Payer: MEDICARE

## 2025-07-10 VITALS
HEART RATE: 60 BPM | SYSTOLIC BLOOD PRESSURE: 136 MMHG | WEIGHT: 118.63 LBS | HEIGHT: 59 IN | OXYGEN SATURATION: 96 % | BODY MASS INDEX: 23.92 KG/M2 | TEMPERATURE: 97 F | DIASTOLIC BLOOD PRESSURE: 58 MMHG

## 2025-07-10 DIAGNOSIS — R09.82 POSTNASAL DRIP: ICD-10-CM

## 2025-07-10 DIAGNOSIS — E11.59 HYPERTENSION ASSOCIATED WITH DIABETES: ICD-10-CM

## 2025-07-10 DIAGNOSIS — E11.9 TYPE 2 DIABETES MELLITUS WITHOUT COMPLICATION, WITHOUT LONG-TERM CURRENT USE OF INSULIN: ICD-10-CM

## 2025-07-10 DIAGNOSIS — I15.2 HYPERTENSION ASSOCIATED WITH DIABETES: ICD-10-CM

## 2025-07-10 DIAGNOSIS — R05.1 ACUTE COUGH: Primary | ICD-10-CM

## 2025-07-10 DIAGNOSIS — E66.3 OVERWEIGHT (BMI 25.0-29.9): ICD-10-CM

## 2025-07-10 PROCEDURE — 1160F RVW MEDS BY RX/DR IN RCRD: CPT | Mod: CPTII,S$GLB,,

## 2025-07-10 PROCEDURE — 99213 OFFICE O/P EST LOW 20 MIN: CPT | Mod: S$GLB,,,

## 2025-07-10 PROCEDURE — 1101F PT FALLS ASSESS-DOCD LE1/YR: CPT | Mod: CPTII,S$GLB,,

## 2025-07-10 PROCEDURE — 1126F AMNT PAIN NOTED NONE PRSNT: CPT | Mod: CPTII,S$GLB,,

## 2025-07-10 PROCEDURE — G2211 COMPLEX E/M VISIT ADD ON: HCPCS | Mod: S$GLB,,,

## 2025-07-10 PROCEDURE — 1159F MED LIST DOCD IN RCRD: CPT | Mod: CPTII,S$GLB,,

## 2025-07-10 PROCEDURE — 3288F FALL RISK ASSESSMENT DOCD: CPT | Mod: CPTII,S$GLB,,

## 2025-07-10 PROCEDURE — 99999 PR PBB SHADOW E&M-EST. PATIENT-LVL V: CPT | Mod: PBBFAC,,,

## 2025-07-10 RX ORDER — CLOPIDOGREL BISULFATE 75 MG/1
75 TABLET ORAL
COMMUNITY
Start: 2025-02-26

## 2025-07-10 RX ORDER — DOXAZOSIN 1 MG/1
1 TABLET ORAL
COMMUNITY
Start: 2025-05-30

## 2025-07-10 RX ORDER — SITAGLIPTIN 100 MG/1
100 TABLET, FILM COATED ORAL
Qty: 90 TABLET | Refills: 0 | Status: SHIPPED | OUTPATIENT
Start: 2025-07-10

## 2025-07-10 RX ORDER — HYDROCODONE POLISTIREX AND CHLORPHENIRAMINE POLISTIREX 10; 8 MG/5ML; MG/5ML
5 SUSPENSION, EXTENDED RELEASE ORAL EVERY 12 HOURS PRN
Qty: 70 ML | Refills: 0 | Status: SHIPPED | OUTPATIENT
Start: 2025-07-10

## 2025-07-10 RX ORDER — NAPROXEN SODIUM 220 MG/1
81 TABLET, FILM COATED ORAL
COMMUNITY
Start: 2025-02-26

## 2025-07-10 RX ORDER — BENZONATATE 100 MG/1
100 CAPSULE ORAL 3 TIMES DAILY PRN
Qty: 30 CAPSULE | Refills: 0 | Status: SHIPPED | OUTPATIENT
Start: 2025-07-10 | End: 2025-07-20

## 2025-07-10 NOTE — PROGRESS NOTES
Edie Hammond  07/10/2025  [id number]    Erin Gary MD  Patient Care Team:  Erin Gary MD as PCP - General (Family Medicine)  James Reeves MD as Consulting Physician (Ophthalmology)  Jacqueline Darnell MD as Consulting Physician (Allergy and Immunology)  Sheryl Hoskins DPM as Consulting Physician (Podiatry)  Faisal Milton MD as Consulting Physician (Cardiology)  Janet Healy MD as Consulting Physician (Hematology and Oncology)  River Stewart OD (Optometry)  Brianna Fox FNP-C as Nurse Practitioner (Family Medicine)     Subjective      Chief Complaint:  Chief Complaint   Patient presents with    Cough       History of Present Illness:    Ms. Hammond presents today for persistent cough. She reports a persistent cough for approximately two weeks, describing it as deep and occasionally causing gagging. She produces clear mucus and experiences occasional shortness of breath. She denies fever, chills, chest pain, sore throat, or significant post-nasal drip. She notes an occasional throat tickle. While she has a history of allergy-related coughs, she states this current cough feels different from her typical pattern. She currently takes Benadryl for allergy symptoms, though was previously recommended to switch to Zyrtec due to Benadryl's sedating side effects.    Review of Systems   Constitutional:  Negative for chills and fever.   HENT:  Positive for nasal congestion and postnasal drip. Negative for sneezing and sore throat.    Respiratory:  Positive for cough (productive) and shortness of breath.    Cardiovascular:  Negative for chest pain.   Gastrointestinal:  Negative for reflux.   Allergic/Immunologic: Positive for environmental allergies.        History:  Past Medical History:   Diagnosis Date    Diabetes     Glaucoma     Follows with Dr. James Reeves, ophthalmologist    Hand arthritis     Heart murmur     Follows with Dr. Faisal Milton, cardiology    HTN (hypertension)      Hypothyroidism     Intracranial hemorrhage     Postmenopausal     Seasonal allergies     Type 2 diabetes mellitus      Past Surgical History:   Procedure Laterality Date    AORTIC VALVE REPLACEMENT      bladder lift      BREAST BIOPSY      BUNIONECTOMY Bilateral     1970s    CATARACT EXTRACTION Bilateral     1999    CHOLECYSTECTOMY      CRANIOTOMY  2017    s/p fall    FOOT NEUROMA SURGERY Right 1982    R foot - neuroma removed    MYOMECTOMY      TOTAL ABDOMINAL HYSTERECTOMY W/ BILATERAL SALPINGOOPHORECTOMY      due to fibroid     Family History   Problem Relation Name Age of Onset    No Known Problems Son      Heart attack Mother      Diabetes Sister      Hypertension Sister      Hypertension Sister      Breast cancer Maternal Aunt      Stroke Neg Hx       Social History[1]     Review of patient's allergies indicates:   Allergen Reactions    Amlodipine besylate Edema, Other (See Comments) and Swelling     Other reaction(s): swelling of feet    Brimonidine tartrate Itching and Swelling    Iodinated contrast media Other (See Comments) and Swelling    Losartan Anaphylaxis, Swelling and Other (See Comments)    Cefazolin     Latanoprost     Metformin hcl Diarrhea       **The following were reviewed at this visit: active problem list, medication list, allergies, family history, social history, and health maintenance.    Medications:  Medications Ordered Prior to Encounter[2]    **Medications have been reviewed and reconciled with patient at this visit.            Objective      Vitals:    07/10/25 1133   BP: (!) 136/58   Pulse: 60   Temp: 96.7 °F (35.9 °C)      Body mass index is 23.96 kg/m².    Wt Readings from Last 3 Encounters:   07/10/25 53.8 kg (118 lb 9.7 oz)   06/11/25 52.7 kg (116 lb 2.9 oz)   05/21/25 52.7 kg (116 lb 2.9 oz)     Temp Readings from Last 3 Encounters:   07/10/25 96.7 °F (35.9 °C) (Tympanic)   05/21/25 97 °F (36.1 °C)   03/06/25 97.9 °F (36.6 °C) (Tympanic)     BP Readings from Last 3 Encounters:    07/10/25 (!) 136/58   05/21/25 120/72   03/10/25 (!) 144/60     Pulse Readings from Last 3 Encounters:   07/10/25 60   05/21/25 88   03/10/25 104         Laboratory Reviewed ({Yes)  Lab Results   Component Value Date    WBC 12.35 03/06/2025    HGB 8.8 (L) 03/06/2025    HCT 27.8 (L) 03/06/2025     03/06/2025    CHOL 242 (H) 10/10/2024    TRIG 137 10/10/2024    HDL 71 10/10/2024    ALT 5 (L) 03/25/2025    AST 17 03/25/2025     (L) 03/06/2025     (L) 03/06/2025    K 3.8 03/06/2025    K 3.6 03/06/2025    CL 97 03/06/2025    CL 98 03/06/2025    CREATININE 1.5 (H) 03/06/2025    CREATININE 1.6 (H) 03/06/2025    BUN 36 (H) 03/06/2025    BUN 35 (H) 03/06/2025    CO2 24 03/06/2025    CO2 24 03/06/2025    TSH 4.917 (H) 10/10/2024    HGBA1C 5.7 (H) 03/06/2025       Physical Exam  Vitals reviewed.   Constitutional:       Appearance: Normal appearance.   HENT:      Head: Normocephalic and atraumatic.      Right Ear: Tympanic membrane, ear canal and external ear normal.      Left Ear: Tympanic membrane, ear canal and external ear normal.      Nose: Congestion present.      Right Turbinates: Swollen and pale.      Left Turbinates: Swollen and pale.      Right Sinus: No maxillary sinus tenderness or frontal sinus tenderness.      Left Sinus: No maxillary sinus tenderness or frontal sinus tenderness.      Mouth/Throat:      Pharynx: Oropharynx is clear. Uvula midline. Postnasal drip present. No posterior oropharyngeal erythema.   Cardiovascular:      Rate and Rhythm: Normal rate and regular rhythm.      Heart sounds: Murmur heard.   Pulmonary:      Effort: Pulmonary effort is normal. No respiratory distress.      Breath sounds: Normal breath sounds.   Neurological:      General: No focal deficit present.      Mental Status: She is alert and oriented to person, place, and time.   Psychiatric:         Mood and Affect: Mood normal.         Behavior: Behavior normal.         Thought Content: Thought content normal.          Judgment: Judgment normal.               Assessment      Edie was seen today for cough.    Diagnoses and all orders for this visit:    Acute cough  -     hydrocodone-chlorpheniramine (TUSSIONEX) 10-8 mg/5 mL suspension; Take 5 mLs by mouth every 12 (twelve) hours as needed for Cough or Congestion.  -     benzonatate (TESSALON) 100 MG capsule; Take 1 capsule (100 mg total) by mouth 3 (three) times daily as needed for Cough.    Postnasal drip  -     hydrocodone-chlorpheniramine (TUSSIONEX) 10-8 mg/5 mL suspension; Take 5 mLs by mouth every 12 (twelve) hours as needed for Cough or Congestion.  -     benzonatate (TESSALON) 100 MG capsule; Take 1 capsule (100 mg total) by mouth 3 (three) times daily as needed for Cough.    Hypertension associated with diabetes   - chronic, stable on antihypertensives per PCP    Type 2 diabetes mellitus without complication, without long-term current use of insulin   - chronic, stable on januvia per PCP    Overweight (BMI 25.0-29.9)            Plan    1) tussionex PRN cough--nighttime; benzonatate PRN cough--daytime  2) avoid taking benadryl; advised to resume certizine as it has less side effects  3) increase hydration; warm tea with honey and lemon advised  4) continue all other present management       Follow up: Follow up if symptoms worsen or fail to improve.    **After visit summary was printed and given to patient upon discharge today.  Patient goals and care plan are included in After Visit Summary.    I have spent a total of 20 minutes on this visit. This includes face to face time and non-face to face time preparing to see the patient (eg, review of tests), obtaining and/or reviewing separately obtained history, documenting clinical information in the electronic or other health record, independently interpreting results and communicating results to the patient/family/caregiver, or care coordinator.          Brianna Fox, MSN, APRN, FNP-C           [1]   Social  History  Socioeconomic History    Marital status:    Occupational History    Occupation: Retired    Tobacco Use    Smoking status: Never     Passive exposure: Never    Smokeless tobacco: Never   Substance and Sexual Activity    Alcohol use: Not Currently    Drug use: Never   Social History Narrative    She wears seatbelt.     Social Drivers of Health     Financial Resource Strain: Low Risk  (5/21/2025)    Overall Financial Resource Strain (CARDIA)     Difficulty of Paying Living Expenses: Not hard at all   Food Insecurity: No Food Insecurity (5/21/2025)    Hunger Vital Sign     Worried About Running Out of Food in the Last Year: Never true     Ran Out of Food in the Last Year: Never true   Transportation Needs: No Transportation Needs (5/21/2025)    PRAPARE - Transportation     Lack of Transportation (Medical): No     Lack of Transportation (Non-Medical): No   Physical Activity: Inactive (5/21/2025)    Exercise Vital Sign     Days of Exercise per Week: 0 days     Minutes of Exercise per Session: 0 min   Stress: No Stress Concern Present (5/21/2025)    [employer] - Occupational Stress Questionnaire     Feeling of Stress : Not at all   Housing Stability: Low Risk  (5/21/2025)    Housing Stability Vital Sign     Unable to Pay for Housing in the Last Year: No     Number of Times Moved in the Last Year: 0     Homeless in the Last Year: No   [2]   Current Outpatient Medications on File Prior to Visit   Medication Sig Dispense Refill    amLODIPine (NORVASC) 10 MG tablet Take 10 mg by mouth once daily.      aspirin 81 MG Chew 81 mg.      blood sugar diagnostic (BLOOD GLUCOSE TEST) Strp Use twice daily prn. Dx: E11.9 300 each 3    bumetanide (BUMEX) 1 MG tablet Take 1 mg by mouth once daily.      cetirizine (ZYRTEC) 10 MG tablet Take 10 mg by mouth once daily.       cloNIDine (CATAPRES) 0.1 MG tablet Take 0.1 mg by mouth 2 (two) times daily.      clopidogreL (PLAVIX) 75 mg  tablet 75 mg.      dorzolamide-timolol 2-0.5% (COSOPT) 22.3-6.8 mg/mL ophthalmic solution 1 drop.      doxazosin (CARDURA) 1 MG tablet Take 1 mg by mouth.      hydrALAZINE (APRESOLINE) 100 MG tablet TAKE ONE TABLET BY MOUTH THREE TIMES DAILY 270 tablet 0    JANUVIA 100 mg Tab TAKE ONE TABLET BY MOUTH ONCE DAILY 90 tablet 1    lancets Misc Use twice daily prn. Dx: E11.9 300 each 3    levothyroxine (SYNTHROID) 50 MCG tablet TAKE ONE TABLET BY MOUTH BEFORE BREAKFAST 90 tablet 3    LUMIGAN 0.01 % Drop Place into both eyes.      multivitamin with minerals tablet Take 1 tablet by mouth once daily.       nitroGLYCERIN (NITROSTAT) 0.4 MG SL tablet Place under the tongue.      blood-glucose meter kit Use as instructed - dx: E11.9 1 each 0    famotidine (PEPCID) 40 MG tablet Take 1 tablet (40 mg total) by mouth once daily. (Patient not taking: Reported on 7/10/2025) 30 tablet 11     No current facility-administered medications on file prior to visit.

## 2025-07-11 NOTE — TELEPHONE ENCOUNTER
Provider Staff:  Action required for this patient    Requires labs      Please see care gap opportunities below in Care Due Message.    Thanks!  [facility]     Appointments      Date Provider   Last Visit   3/6/2025 Erin Gary MD   Next Visit   10/6/2025 Erin Gary MD     Refill Decision Note   Edie Hammond  is requesting a refill authorization.  Brief Assessment and Rationale for Refill:  Approve     [facility]      Pharmacist review requested: Yes   Comments:     Note composed:11:12 PM 07/10/2025

## 2025-07-11 NOTE — TELEPHONE ENCOUNTER
Care Due:                  Date            Visit Type   Department     Provider  --------------------------------------------------------------------------------                                EP -                              PRIMARY      [facility]  Last Visit: 03-      CARE ([facility])   BROOKE Gary                              EP -                              PRIMARY      [facility]  Next Visit: 10-      CARE ([facility])   MEDICINE       Erin Gary                                                            Last  Test          Frequency    Reason                     Performed    Due Date  --------------------------------------------------------------------------------    HBA1C.......  6 months...  JANUVIA..................  03- 09-    TSH.........  12 months..  levothyroxine............  10-   10-    [facility] Embedded Care Due Messages. Reference number: [id number].   7/10/2025 9:11:03 PM CDT

## 2025-07-11 NOTE — TELEPHONE ENCOUNTER
Refill Routing Note   Medication(s) are not appropriate for processing by [facility] for the following reason(s):        Required labs abnormal    [facility] action(s):  Defer     Requires labs : Yes      Medication Therapy Plan: [facility]    Pharmacist review requested: Yes     Appointments  past 12m or future 3m with PCP    Date Provider   Last Visit   3/6/2025 Erin Gary MD   Next Visit   10/6/2025 Erin Gary MD   ED visits in past 90 days: 0        Note composed:10:58 PM 07/10/2025

## 2025-08-05 ENCOUNTER — TELEPHONE (OUTPATIENT)
Dept: FAMILY MEDICINE | Facility: CLINIC | Age: OVER 89
End: 2025-08-05
Payer: MEDICARE

## 2025-08-05 NOTE — TELEPHONE ENCOUNTER
Copied from CRM #[id number]. Topic: General Inquiry - Patient Advice  >> Aug 5, 2025 11:27 AM Annalise wrote:  Patient called to consult with nurse or staff regarding her refill for test strips. She states [facility] states they have not received the prescription from Dr. Gary. She states [facility] has sent 2 request with no response and will be sending another one today. Patient states she will be out of her test strips in 2 days and would like to speak with clinic regarding this if possible. She can be reached at [phone]. Thanks/MR

## 2025-08-12 ENCOUNTER — TELEPHONE (OUTPATIENT)
Dept: FAMILY MEDICINE | Facility: CLINIC | Age: OVER 89
End: 2025-08-12
Payer: MEDICARE